# Patient Record
Sex: MALE | Race: WHITE | Employment: OTHER | URBAN - METROPOLITAN AREA
[De-identification: names, ages, dates, MRNs, and addresses within clinical notes are randomized per-mention and may not be internally consistent; named-entity substitution may affect disease eponyms.]

---

## 2017-02-28 ENCOUNTER — OFFICE VISIT (OUTPATIENT)
Dept: FAMILY MEDICINE | Facility: CLINIC | Age: 62
End: 2017-02-28
Payer: COMMERCIAL

## 2017-02-28 VITALS
WEIGHT: 161 LBS | OXYGEN SATURATION: 98 % | HEIGHT: 69 IN | TEMPERATURE: 99.1 F | HEART RATE: 87 BPM | DIASTOLIC BLOOD PRESSURE: 90 MMHG | BODY MASS INDEX: 23.85 KG/M2 | SYSTOLIC BLOOD PRESSURE: 182 MMHG

## 2017-02-28 DIAGNOSIS — Z00.00 ENCOUNTER FOR ROUTINE ADULT HEALTH EXAMINATION WITHOUT ABNORMAL FINDINGS: Primary | ICD-10-CM

## 2017-02-28 DIAGNOSIS — Z23 NEED FOR PROPHYLACTIC VACCINATION AND INOCULATION AGAINST VIRAL HEPATITIS: ICD-10-CM

## 2017-02-28 DIAGNOSIS — F10.20 ETOHISM (H): ICD-10-CM

## 2017-02-28 DIAGNOSIS — E78.5 HYPERLIPIDEMIA LDL GOAL <130: ICD-10-CM

## 2017-02-28 DIAGNOSIS — I10 BENIGN ESSENTIAL HYPERTENSION: ICD-10-CM

## 2017-02-28 DIAGNOSIS — Z11.59 NEED FOR HEPATITIS C SCREENING TEST: ICD-10-CM

## 2017-02-28 PROCEDURE — 90471 IMMUNIZATION ADMIN: CPT | Performed by: FAMILY MEDICINE

## 2017-02-28 PROCEDURE — 90632 HEPA VACCINE ADULT IM: CPT | Performed by: FAMILY MEDICINE

## 2017-02-28 PROCEDURE — 99396 PREV VISIT EST AGE 40-64: CPT | Mod: 25 | Performed by: FAMILY MEDICINE

## 2017-02-28 RX ORDER — ATORVASTATIN CALCIUM 80 MG/1
80 TABLET, FILM COATED ORAL DAILY
Qty: 90 TABLET | Refills: 4 | Status: SHIPPED | OUTPATIENT
Start: 2017-02-28 | End: 2017-04-14

## 2017-02-28 RX ORDER — AMLODIPINE BESYLATE 5 MG/1
5 TABLET ORAL DAILY
Qty: 90 TABLET | Refills: 4 | Status: SHIPPED | OUTPATIENT
Start: 2017-02-28 | End: 2018-05-24

## 2017-02-28 RX ORDER — LOSARTAN POTASSIUM 100 MG/1
100 TABLET ORAL DAILY
Qty: 90 TABLET | Refills: 4 | Status: SHIPPED | OUTPATIENT
Start: 2017-02-28 | End: 2018-05-24

## 2017-02-28 NOTE — LETTER
My Depression Action Plan  Name: Kailash Murray   Date of Birth 1955  Date: 2/28/2017    My doctor: Jazzmine Vitale   My clinic: [address]  [place] [address]  [phone]          GREEN    ZONE   Good Control    What it looks like:     Things are going generally well. You have normal up s and down s. You may even feel depressed from time to time, but bad moods usually last less than a day.   What you need to do:  1. Continue to care for yourself (see self care plan)  2. Check your depression survival kit and update it as needed  3. Follow your physician s recommendations including any medication.  4. Do not stop taking medication unless you consult with your physician first.           YELLOW         ZONE Getting Worse    What it looks like:     Depression is starting to interfere with your life.     It may be hard to get out of bed; you may be starting to isolate yourself from others.    Symptoms of depression are starting to last most all day and this has happened for several days.     You may have suicidal thoughts but they are not constant.   What you need to do:     1. Call your care team, your response to treatment will improve if you keep your care team informed of your progress. Yellow periods are signs an adjustment may need to be made.     2. Continue your self-care, even if you have to fake it!    3. Talk to someone in your support network    4. Open up your depression survival kit           RED    ZONE Medical Alert - Get Help    What it looks like:     Depression is seriously interfering with your life.     You may experience these or other symptoms: You can t get out of bed most days, can t work or engage in other necessary activities, you have trouble taking care of basic hygiene, or basic responsibilities, thoughts of suicide or death that will not go away, self-injurious behavior.     What you need to do:  1. Call your care team and  request a same-day appointment. If they are not available (weekends or after hours) call your local crisis line, emergency room or 911.      Electronically signed by: WALTER KIM, February 28, 2017    Depression Self Care Plan / Survival Kit    Self-Care for Depression  Here s the deal. Your body and mind are really not as separate as most people think.  What you do and think affects how you feel and how you feel influences what you do and think. This means if you do things that people who feel good do, it will help you feel better.  Sometimes this is all it takes.  There is also a place for medication and therapy depending on how severe your depression is, so be sure to consult with your medical provider and/ or Behavioral Health Consultant if your symptoms are worsening or not improving.     In order to better manage my stress, I will:    Exercise  Get some form of exercise, every day. This will help reduce pain and release endorphins, the  feel good  chemicals in your brain. This is almost as good as taking antidepressants!  This is not the same as joining a gym and then never going! (they count on that by the way ) It can be as simple as just going for a walk or doing some gardening, anything that will get you moving.      Hygiene   Maintain good hygiene (Get out of bed in the morning, Make your bed, Brush your teeth, Take a shower, and Get dressed like you were going to work, even if you are unemployed).  If your clothes don't fit try to get ones that do.    Diet  I will strive to eat foods that are good for me, drink plenty of water, and avoid excessive sugar, caffeine, alcohol, and other mood-altering substances.  Some foods that are helpful in depression are: complex carbohydrates, B vitamins, flaxseed, fish or fish oil, fresh fruits and vegetables.    Psychotherapy  I agree to participate in Individual Therapy (if recommended).    Medication  If prescribed medications, I agree to take them.  Missing  doses can result in serious side effects.  I understand that drinking alcohol, or other illicit drug use, may cause potential side effects.  I will not stop my medication abruptly without first discussing it with my provider.    Staying Connected With Others  I will stay in touch with my friends, family members, and my primary care provider/team.    Use your imagination  Be creative.  We all have a creative side; it doesn t matter if it s oil painting, sand castles, or mud pies! This will also kick up the endorphins.    Witness Beauty  (AKA stop and smell the roses) Take a look outside, even in mid-winter. Notice colors, textures. Watch the squirrels and birds.     Service to others  Be of service to others.  There is always someone else in need.  By helping others we can  get out of ourselves  and remember the really important things.  This also provides opportunities for practicing all the other parts of the program.    Humor  Laugh and be silly!  Adjust your TV habits for less news and crime-drama and more comedy.    Control your stress  Try breathing deep, massage therapy, biofeedback, and meditation. Find time to relax each day.     My support system    Clinic Contact:  Phone number:    Contact 1:  Phone number:    Contact 2:  Phone number:    Denominational/:  Phone number:    Therapist:  Phone number:    Local crisis center:    Phone number:    Other community support:  Phone number:

## 2017-02-28 NOTE — NURSING NOTE
Screening Questionnaire for Adult Immunization    Are you sick today?   No   Do you have allergies to medications, food, a vaccine component or latex?   Yes   Have you ever had a serious reaction after receiving a vaccination?   No   Do you have a long-term health problem with heart disease, lung disease, asthma, kidney disease, metabolic disease (e.g. diabetes), anemia, or other blood disorder?   No   Do you have cancer, leukemia, HIV/AIDS, or any other immune system problem?   No   In the past 3 months, have you taken medications that affect  your immune system, such as prednisone, other steroids, or anticancer drugs; drugs for the treatment of rheumatoid arthritis, Crohn s disease, or psoriasis; or have you had radiation treatments?   No   Have you had a seizure, or a brain or other nervous system problem?   No   During the past year, have you received a transfusion of blood or blood     products, or been given immune (gamma) globulin or antiviral drug?   No   For women: Are you pregnant or is there a chance you could become        pregnant during the next month?   No   Have you received any vaccinations in the past 4 weeks?   No     Immunization questionnaire was positive for at least one answer.  Notified provider of patient's allergies.      MNVFC doesn't apply on this patient    Per orders of Dr. Vitale, injection of Hep A given by Renuka Villanueva. Patient instructed to remain in clinic for 20 minutes afterwards, and to report any adverse reaction to me immediately.       Screening performed by Renuka Villanueva on 2/28/2017 at 2:24 PM.

## 2017-02-28 NOTE — NURSING NOTE
"Chief Complaint   Patient presents with     Physical     Medication Reconciliation     Has stopped medications a year ago       Initial /90 (BP Location: Right arm, Patient Position: Chair, Cuff Size: Adult Regular)  Pulse 87  Temp 99.1  F (37.3  C)  Ht 5' 9.25\" (1.759 m)  Wt 161 lb (73 kg)  SpO2 98%  BMI 23.6 kg/m2 Estimated body mass index is 23.6 kg/(m^2) as calculated from the following:    Height as of this encounter: 5' 9.25\" (1.759 m).    Weight as of this encounter: 161 lb (73 kg).  Medication Reconciliation: complete   Meredith Pettit MA      "

## 2017-02-28 NOTE — MR AVS SNAPSHOT
After Visit Summary   2/28/2017    Kailash Murray    MRN: [id number]           Patient Information     Date Of Birth          1955        Visit Information        Provider Department      2/28/2017 2:00 PM Jazzmine Vitale MD [facility]        Today's Diagnoses     Encounter for routine adult health examination without abnormal findings    -  1    Need for hepatitis C screening test        Hyperlipidemia LDL goal <130        Benign essential hypertension        ETOHism (H)        Need for prophylactic vaccination and inoculation against viral hepatitis          Care Instructions      Preventive Health Recommendations  Male Ages 50 - 64    Yearly exam:             See your health care provider every year in order to  o   Review health changes.   o   Discuss preventive care.    o   Review your medicines if your doctor has prescribed any.     Have a cholesterol test every 5 years, or more frequently if you are at risk for high cholesterol/heart disease.     Have a diabetes test (fasting glucose) every three years. If you are at risk for diabetes, you should have this test more often.     Have a colonoscopy at age 50, or have a yearly FIT test (stool test). These exams will check for colon cancer.      Talk with your health care provider about whether or not a prostate cancer screening test (PSA) is right for you.    You should be tested each year for STDs (sexually transmitted diseases), if you re at risk.     Shots: Get a flu shot each year. Get a tetanus shot every 10 years.     Nutrition:    Eat at least 5 servings of fruits and vegetables daily.     Eat whole-grain bread, whole-wheat pasta and brown rice instead of white grains and rice.     Talk to your provider about Calcium and Vitamin D.     Lifestyle    Exercise for at least 150 minutes a week (30 minutes a day, 5 days a week). This will help you control your weight and prevent disease.     Limit alcohol to one drink per  day.     No smoking.     Wear sunscreen to prevent skin cancer.     See your dentist every six months for an exam and cleaning.   See your eye doctor every 1 to 2 years.  [facility]    If you have any questions regarding to your visit please contact your care team:       Team Purple:   Clinic Hours Telephone Number   [address]   7am-7pm  Monday - Thursday   7am-5pm  Fridays  [phone]  (Appointment scheduling available 24/7)    Questions about your Visit?   Team Line:  [phone]   Urgent Care - [place] and [place] - 11am-9pm Monday-Friday Saturday-Sunday- 9am-5pm   [place] - 5pm-9pm Monday-Friday Saturday-Sunday- 9am-5pm  [phone] - [facility]  [phone] - [place]       What options do I have for visits at the clinic other than the traditional office visit?  To expand how we care for you, many of our providers are utilizing electronic visits (e-visits) and telephone visits, when medically appropriate, for interactions with their patients rather than a visit in the clinic.   We also offer nurse visits for many medical concerns. Just like any other service, we will bill your insurance company for this type of visit based on time spent on the phone with your provider. Not all insurance companies cover these visits. Please check with your medical insurance if this type of visit is covered. You will be responsible for any charges that are not paid by your insurance.      E-visits via [employer]:  generally incur a $35.00 fee.  Telephone visits:  Time spent on the phone: *charged based on time that is spent on the phone in increments of 10 minutes. Estimated cost:   5-10 mins $30.00   11-20 mins. $59.00   21-30 mins. $85.00     Use [employer] (secure email communication and access to your chart) to send your primary care provider a message or make an appointment. Ask someone on your Team how to sign up for  [employer].  For a Price Quote for your services, please call our Consumer Price Line at [phone].  As always, Thank you for trusting us with your health care needs!            Follow-ups after your visit        Future tests that were ordered for you today     Open Future Orders        Priority Expected Expires Ordered    Hepatic panel Routine 4/28/2017 2/28/2018 2/28/2017    Hepatitis C Screen Reflex to HCV RNA Quant and Genotype Routine  4/28/2017 2/28/2017    BASIC METABOLIC PANEL Routine  4/28/2017 2/28/2017    Lipid panel reflex to direct LDL Routine  4/28/2017 2/28/2017            Who to contact     If you have questions or need follow up information about today's clinic visit or your schedule please contact [facility] directly at [phone].  Normal or non-critical lab and imaging results will be communicated to you by [employer], letter or phone within 4 business days after the clinic has received the results. If you do not hear from us within 7 days, please contact the clinic through [employer] or phone. If you have a critical or abnormal lab result, we will notify you by phone as soon as possible.  Submit refill requests through [employer] or call your pharmacy and they will forward the refill request to us. Please allow 3 business days for your refill to be completed.          Additional Information About Your Visit        [employer] Information     [employer] gives you secure access to your electronic health record. If you see a primary care provider, you can also send messages to your care team and make appointments. If you have questions, please call your primary care clinic.  If you do not have a primary care provider, please call [phone] and they will assist you.        Care EveryWhere ID     This is your Care EveryWhere ID. This could be used by other organizations to access your [place] medical records  [id number]        Your Vitals Were     Pulse Temperature Height Pulse Oximetry BMI  "(Body Mass Index)       87 99.1  F (37.3  C) 5' 9.25\" (1.759 m) 98% 23.6 kg/m2        Blood Pressure from Last 3 Encounters:   02/28/17 182/90   01/13/16 132/70   02/24/15 132/62    Weight from Last 3 Encounters:   02/28/17 161 lb (73 kg)   01/13/16 173 lb (78.5 kg)   03/02/15 172 lb (78 kg)              We Performed the Following     DEPRESSION ACTION PLAN (DAP) Order [[id number]]     HEPATITIS A VACCINE (ADULT)        Primary Care Provider Office Phone # Fax #    Jazzmine Vitale [phone]       [address] [address]        Thank you!     Thank you for choosing [facility]  for your care. Our goal is always to provide you with excellent care. Hearing back from our patients is one way we can continue to improve our services. Please take a few minutes to complete the written survey that you may receive in the mail after your visit with us. Thank you!             Your Updated Medication List - Protect others around you: Learn how to safely use, store and throw away your medicines at www.disposemymeds.org.          This list is accurate as of: 2/28/17  2:14 PM.  Always use your most recent med list.                   Brand Name Dispense Instructions for use    amLODIPine 5 MG tablet    NORVASC    90 tablet    Take 1 tablet (5 mg) by mouth daily       aspirin 81 MG tablet     30 tablet    Take 1 tablet (81 mg) by mouth daily       atorvastatin 80 MG tablet    LIPITOR    90 tablet    Take 1 tablet (80 mg) by mouth daily Don't mail any until patient calls       losartan 100 MG tablet    COZAAR    90 tablet    Take 1 tablet (100 mg) by mouth daily         "
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
## 2017-02-28 NOTE — PATIENT INSTRUCTIONS
Preventive Health Recommendations  Male Ages 50   64    Yearly exam:             See your health care provider every year in order to  o   Review health changes.   o   Discuss preventive care.    o   Review your medicines if your doctor has prescribed any.     Have a cholesterol test every 5 years, or more frequently if you are at risk for high cholesterol/heart disease.     Have a diabetes test (fasting glucose) every three years. If you are at risk for diabetes, you should have this test more often.     Have a colonoscopy at age 50, or have a yearly FIT test (stool test). These exams will check for colon cancer.      Talk with your health care provider about whether or not a prostate cancer screening test (PSA) is right for you.    You should be tested each year for STDs (sexually transmitted diseases), if you re at risk.     Shots: Get a flu shot each year. Get a tetanus shot every 10 years.     Nutrition:    Eat at least 5 servings of fruits and vegetables daily.     Eat whole-grain bread, whole-wheat pasta and brown rice instead of white grains and rice.     Talk to your provider about Calcium and Vitamin D.     Lifestyle    Exercise for at least 150 minutes a week (30 minutes a day, 5 days a week). This will help you control your weight and prevent disease.     Limit alcohol to one drink per day.     No smoking.     Wear sunscreen to prevent skin cancer.     See your dentist every six months for an exam and cleaning.   See your eye doctor every 1 to 2 years.  [facility]    If you have any questions regarding to your visit please contact your care team:       Team Purple:   Clinic Hours Telephone Number   [address] Dr.   7am-7pm  Monday - Thursday   7am-5pm  Fridays  [phone]  (Appointment scheduling available 24/7)    Questions about your Visit?   Team Line:  [phone]   Urgent Care - [place] and [facility] -  11am-9pm Monday-Friday Saturday-Sunday- 9am-5pm   [place] - 5pm-9pm Monday-Friday Saturday-Sunday- 9am-5pm  [phone] - [place]   [phone] - [place]       What options do I have for visits at the clinic other than the traditional office visit?  To expand how we care for you, many of our providers are utilizing electronic visits (e-visits) and telephone visits, when medically appropriate, for interactions with their patients rather than a visit in the clinic.   We also offer nurse visits for many medical concerns. Just like any other service, we will bill your insurance company for this type of visit based on time spent on the phone with your provider. Not all insurance companies cover these visits. Please check with your medical insurance if this type of visit is covered. You will be responsible for any charges that are not paid by your insurance.      E-visits via [employer]:  generally incur a $35.00 fee.  Telephone visits:  Time spent on the phone: *charged based on time that is spent on the phone in increments of 10 minutes. Estimated cost:   5-10 mins $30.00   11-20 mins. $59.00   21-30 mins. $85.00     Use [employer] (secure email communication and access to your chart) to send your primary care provider a message or make an appointment. Ask someone on your Team how to sign up for [employer].  For a Price Quote for your services, please call our Consumer Price Line at [phone].  As always, Thank you for trusting us with your health care needs!

## 2017-03-01 DIAGNOSIS — E78.5 HYPERLIPIDEMIA LDL GOAL <130: ICD-10-CM

## 2017-03-01 DIAGNOSIS — I10 BENIGN ESSENTIAL HYPERTENSION: ICD-10-CM

## 2017-03-01 DIAGNOSIS — Z11.59 NEED FOR HEPATITIS C SCREENING TEST: ICD-10-CM

## 2017-03-01 LAB
ANION GAP SERPL CALCULATED.3IONS-SCNC: 6 MMOL/L (ref 3–14)
BUN SERPL-MCNC: 16 MG/DL (ref 7–30)
CALCIUM SERPL-MCNC: 9.1 MG/DL (ref 8.5–10.1)
CHLORIDE SERPL-SCNC: 105 MMOL/L (ref 94–109)
CHOLEST SERPL-MCNC: 219 MG/DL
CO2 SERPL-SCNC: 28 MMOL/L (ref 20–32)
CREAT SERPL-MCNC: 0.78 MG/DL (ref 0.66–1.25)
GFR SERPL CREATININE-BSD FRML MDRD: NORMAL ML/MIN/1.7M2
GLUCOSE SERPL-MCNC: 93 MG/DL (ref 70–99)
HCV AB SERPL QL IA: NORMAL
HDLC SERPL-MCNC: 85 MG/DL
LDLC SERPL CALC-MCNC: 115 MG/DL
NONHDLC SERPL-MCNC: 134 MG/DL
POTASSIUM SERPL-SCNC: 4.1 MMOL/L (ref 3.4–5.3)
SODIUM SERPL-SCNC: 139 MMOL/L (ref 133–144)
TRIGL SERPL-MCNC: 93 MG/DL

## 2017-03-01 PROCEDURE — 80061 LIPID PANEL: CPT | Performed by: FAMILY MEDICINE

## 2017-03-01 PROCEDURE — 80048 BASIC METABOLIC PNL TOTAL CA: CPT | Performed by: FAMILY MEDICINE

## 2017-03-01 PROCEDURE — 86803 HEPATITIS C AB TEST: CPT | Performed by: FAMILY MEDICINE

## 2017-03-01 PROCEDURE — 36415 COLL VENOUS BLD VENIPUNCTURE: CPT | Performed by: FAMILY MEDICINE

## 2017-03-06 NOTE — PROGRESS NOTES
Kailash,    Your hepatitis C, blood glucose, and kidney tests are normal. Your cholesterol is controlled.     Carolyne Cornejo MD
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
## 2017-04-14 ENCOUNTER — OFFICE VISIT (OUTPATIENT)
Dept: FAMILY MEDICINE | Facility: CLINIC | Age: 62
End: 2017-04-14
Payer: COMMERCIAL

## 2017-04-14 VITALS
OXYGEN SATURATION: 95 % | TEMPERATURE: 98 F | BODY MASS INDEX: 23.46 KG/M2 | SYSTOLIC BLOOD PRESSURE: 124 MMHG | WEIGHT: 160 LBS | DIASTOLIC BLOOD PRESSURE: 80 MMHG | HEART RATE: 67 BPM

## 2017-04-14 DIAGNOSIS — E78.5 HYPERLIPIDEMIA LDL GOAL <130: ICD-10-CM

## 2017-04-14 DIAGNOSIS — I10 BENIGN ESSENTIAL HYPERTENSION: Primary | ICD-10-CM

## 2017-04-14 DIAGNOSIS — F32.1 MAJOR DEPRESSIVE DISORDER, SINGLE EPISODE, MODERATE (H): ICD-10-CM

## 2017-04-14 LAB
ALBUMIN SERPL-MCNC: 4.1 G/DL (ref 3.4–5)
ALP SERPL-CCNC: 69 U/L (ref 40–150)
ALT SERPL W P-5'-P-CCNC: 37 U/L (ref 0–70)
ANION GAP SERPL CALCULATED.3IONS-SCNC: 8 MMOL/L (ref 3–14)
AST SERPL W P-5'-P-CCNC: 30 U/L (ref 0–45)
BILIRUB SERPL-MCNC: 0.2 MG/DL (ref 0.2–1.3)
BUN SERPL-MCNC: 17 MG/DL (ref 7–30)
CALCIUM SERPL-MCNC: 9.1 MG/DL (ref 8.5–10.1)
CHLORIDE SERPL-SCNC: 109 MMOL/L (ref 94–109)
CHOLEST SERPL-MCNC: 216 MG/DL
CO2 SERPL-SCNC: 25 MMOL/L (ref 20–32)
CREAT SERPL-MCNC: 0.84 MG/DL (ref 0.66–1.25)
GFR SERPL CREATININE-BSD FRML MDRD: NORMAL ML/MIN/1.7M2
GLUCOSE SERPL-MCNC: 91 MG/DL (ref 70–99)
HDLC SERPL-MCNC: 91 MG/DL
LDLC SERPL CALC-MCNC: 107 MG/DL
NONHDLC SERPL-MCNC: 125 MG/DL
POTASSIUM SERPL-SCNC: 4.3 MMOL/L (ref 3.4–5.3)
PROT SERPL-MCNC: 7.7 G/DL (ref 6.8–8.8)
SODIUM SERPL-SCNC: 142 MMOL/L (ref 133–144)
TRIGL SERPL-MCNC: 91 MG/DL

## 2017-04-14 PROCEDURE — 99213 OFFICE O/P EST LOW 20 MIN: CPT | Performed by: FAMILY MEDICINE

## 2017-04-14 PROCEDURE — 80053 COMPREHEN METABOLIC PANEL: CPT | Performed by: FAMILY MEDICINE

## 2017-04-14 PROCEDURE — 80061 LIPID PANEL: CPT | Performed by: FAMILY MEDICINE

## 2017-04-14 PROCEDURE — 36415 COLL VENOUS BLD VENIPUNCTURE: CPT | Performed by: FAMILY MEDICINE

## 2017-04-14 RX ORDER — ASCORBIC ACID 125 MG
TABLET,CHEWABLE ORAL
COMMUNITY
Start: 2017-04-14 | End: 2018-10-08

## 2017-04-14 RX ORDER — MULTIPLE VITAMINS W/ MINERALS TAB 9MG-400MCG
1 TAB ORAL DAILY
COMMUNITY
End: 2018-10-08

## 2017-04-14 ASSESSMENT — ANXIETY QUESTIONNAIRES
2. NOT BEING ABLE TO STOP OR CONTROL WORRYING: NOT AT ALL
7. FEELING AFRAID AS IF SOMETHING AWFUL MIGHT HAPPEN: NOT AT ALL
5. BEING SO RESTLESS THAT IT IS HARD TO SIT STILL: NOT AT ALL
6. BECOMING EASILY ANNOYED OR IRRITABLE: NOT AT ALL
1. FEELING NERVOUS, ANXIOUS, OR ON EDGE: NOT AT ALL
IF YOU CHECKED OFF ANY PROBLEMS ON THIS QUESTIONNAIRE, HOW DIFFICULT HAVE THESE PROBLEMS MADE IT FOR YOU TO DO YOUR WORK, TAKE CARE OF THINGS AT HOME, OR GET ALONG WITH OTHER PEOPLE: NOT DIFFICULT AT ALL
GAD7 TOTAL SCORE: 0
3. WORRYING TOO MUCH ABOUT DIFFERENT THINGS: NOT AT ALL

## 2017-04-14 ASSESSMENT — PAIN SCALES - GENERAL: PAINLEVEL: NO PAIN (0)

## 2017-04-14 ASSESSMENT — PATIENT HEALTH QUESTIONNAIRE - PHQ9: 5. POOR APPETITE OR OVEREATING: NOT AT ALL

## 2017-04-14 NOTE — PATIENT INSTRUCTIONS
[facility]    If you have any questions regarding to your visit please contact your care team:       Team Purple:   Clinic Hours Telephone Number   [address] Dr.   7am-7pm  Monday - Thursday   7am-5pm  Fridays  [phone]  (Appointment scheduling available 24/7)    Questions about your Visit?   Team Line:  [phone]   Urgent Care - [place] and [facility] - 11am-9pm Monday-Friday Saturday-Sunday- 9am-5pm   [place] - 5pm-9pm Monday-Friday Saturday-Sunday- 9am-5pm  [phone] - [facility]  [phone] - [place]       What options do I have for visits at the clinic other than the traditional office visit?  To expand how we care for you, many of our providers are utilizing electronic visits (e-visits) and telephone visits, when medically appropriate, for interactions with their patients rather than a visit in the clinic.   We also offer nurse visits for many medical concerns. Just like any other service, we will bill your insurance company for this type of visit based on time spent on the phone with your provider. Not all insurance companies cover these visits. Please check with your medical insurance if this type of visit is covered. You will be responsible for any charges that are not paid by your insurance.      E-visits via [employer]:  generally incur a $35.00 fee.  Telephone visits:  Time spent on the phone: *charged based on time that is spent on the phone in increments of 10 minutes. Estimated cost:   5-10 mins $30.00   11-20 mins. $59.00   21-30 mins. $85.00     Use [employer] (secure email communication and access to your chart) to send your primary care provider a message or make an appointment. Ask someone on your Team how to sign up for [employer].  For a Price Quote for your services, please call our Consumer Price Line at [phone].  As always, Thank you for trusting us with your health care needs!

## 2017-04-14 NOTE — MR AVS SNAPSHOT
After Visit Summary   4/14/2017    Kailash Murray    MRN: [id number]           Patient Information     Date Of Birth          1955        Visit Information        Provider Department      4/14/2017 10:30 AM Jazzmine Vitale MD [facility]        Today's Diagnoses     Benign essential hypertension    -  1    Hyperlipidemia LDL goal <130        Major depressive disorder, single episode, moderate (H)          Care Instructions    [facility]    If you have any questions regarding to your visit please contact your care team:       Team Purple:   Clinic Hours Telephone Number   [address] Dr.   7am-7pm  Monday - Thursday   7am-5pm  Fridays  [phone]  (Appointment scheduling available 24/7)    Questions about your Visit?   Team Line:  [phone]   [facility] [place] [facility] 11am-9pm Monday-Friday Saturday-Sunday- 9am-5pm   [place] - 5pm-9pm Monday-Friday Saturday-Sunday- 9am-5pm  [phone] - [facility]  [phone] - [place]       What options do I have for visits at the clinic other than the traditional office visit?  To expand how we care for you, many of our providers are utilizing electronic visits (e-visits) and telephone visits, when medically appropriate, for interactions with their patients rather than a visit in the clinic.   We also offer nurse visits for many medical concerns. Just like any other service, we will bill your insurance company for this type of visit based on time spent on the phone with your provider. Not all insurance companies cover these visits. Please check with your medical insurance if this type of visit is covered. You will be responsible for any charges that are not paid by your insurance.      E-visits via SimpleSite:  generally incur a $35.00 fee.  Telephone visits:  Time spent on the phone: *charged based on time that is spent on the phone  in increments of 10 minutes. Estimated cost:   5-10 mins $30.00   11-20 mins. $59.00   21-30 mins. $85.00     Use Ludesit (secure email communication and access to your chart) to send your primary care provider a message or make an appointment. Ask someone on your Team how to sign up for [employer].  For a Price Quote for your services, please call our [employer] Line at [phone].  As always, Thank you for trusting us with your health care needs!            Follow-ups after your visit        Who to contact     If you have questions or need follow up information about today's clinic visit or your schedule please contact [facility] directly at [phone].  Normal or non-critical lab and imaging results will be communicated to you by [employer], letter or phone within 4 business days after the clinic has received the results. If you do not hear from us within 7 days, please contact the clinic through Ludesit or phone. If you have a critical or abnormal lab result, we will notify you by phone as soon as possible.  Submit refill requests through [employer] or call your pharmacy and they will forward the refill request to us. Please allow 3 business days for your refill to be completed.          Additional Information About Your Visit        [employer] Information     [employer] gives you secure access to your electronic health record. If you see a primary care provider, you can also send messages to your care team and make appointments. If you have questions, please call your primary care clinic.  If you do not have a primary care provider, please call [phone] and they will assist you.        Care EveryWhere ID     This is your Care EveryWhere ID. This could be used by other organizations to access your Mobile medical records  [id number]        Your Vitals Were     Pulse Temperature Pulse Oximetry BMI (Body Mass Index)          67 98  F (36.7  C) (Oral) 95% 23.46 kg/m2         Blood Pressure from Last 3  Encounters:   04/14/17 124/80   02/28/17 182/90   01/13/16 132/70    Weight from Last 3 Encounters:   04/14/17 160 lb (72.6 kg)   02/28/17 161 lb (73 kg)   01/13/16 173 lb (78.5 kg)              We Performed the Following     Comprehensive metabolic panel     Lipid panel reflex to direct LDL        Primary Care Provider Office Phone # Fax #    Jazzmine Vitale [phone]       [address] [address]        Thank you!     Thank you for choosing [facility]  for your care. Our goal is always to provide you with excellent care. Hearing back from our patients is one way we can continue to improve our services. Please take a few minutes to complete the written survey that you may receive in the mail after your visit with us. Thank you!             Your Updated Medication List - Protect others around you: Learn how to safely use, store and throw away your medicines at www.disposemymeds.org.          This list is accurate as of: 4/14/17 10:54 AM.  Always use your most recent med list.                   Brand Name Dispense Instructions for use    amLODIPine 5 MG tablet    NORVASC    90 tablet    Take 1 tablet (5 mg) by mouth daily       aspirin 81 MG tablet     30 tablet    Take 1 tablet (81 mg) by mouth daily       losartan 100 MG tablet    COZAAR    90 tablet    Take 1 tablet (100 mg) by mouth daily       Multi-vitamin Tabs tablet      Take 1 tablet by mouth daily

## 2017-04-14 NOTE — LETTER
[facility]  [address] [place] [address] [place] [address]    April 17, 2017    Kailash Murray  [address] [place] [id number]          Dear Kailash,    All of your lab tests are normal. Continue your healthy lifestyle    Enclosed is a copy of your results.     Results for orders placed or performed in visit on 04/14/17   Lipid panel reflex to direct LDL   Result Value Ref Range    Cholesterol 216 (H) <200 mg/dL    Triglycerides 91 <150 mg/dL    HDL Cholesterol 91 >39 mg/dL    LDL Cholesterol Calculated 107 (H) <100 mg/dL    Non HDL Cholesterol 125 <130 mg/dL   Comprehensive metabolic panel   Result Value Ref Range    Sodium 142 133 - 144 mmol/L    Potassium 4.3 3.4 - 5.3 mmol/L    Chloride 109 94 - 109 mmol/L    Carbon Dioxide 25 20 - 32 mmol/L    Anion Gap 8 3 - 14 mmol/L    Glucose 91 70 - 99 mg/dL    Urea Nitrogen 17 7 - 30 mg/dL    Creatinine 0.84 0.66 - 1.25 mg/dL    GFR Estimate >90  Non  GFR Calc   >60 mL/min/1.7m2    GFR Estimate If Black >90   GFR Calc   >60 mL/min/1.7m2    Calcium 9.1 8.5 - 10.1 mg/dL    Bilirubin Total 0.2 0.2 - 1.3 mg/dL    Albumin 4.1 3.4 - 5.0 g/dL    Protein Total 7.7 6.8 - 8.8 g/dL    Alkaline Phosphatase 69 40 - 150 U/L    ALT 37 0 - 70 U/L    AST 30 0 - 45 U/L       If you have any questions or concerns, please call myself or my nurse at [phone].      Sincerely,        Jazzmine Vitale MD/SANTY

## 2017-04-14 NOTE — NURSING NOTE
"Chief Complaint   Patient presents with     RECHECK     From stopping Atorvastatin. Labs - patient is fasting.       Initial /80 (BP Location: Right arm, Cuff Size: Adult Regular)  Pulse 67  Temp 98  F (36.7  C) (Oral)  Wt 160 lb (72.6 kg)  SpO2 95%  BMI 23.46 kg/m2 Estimated body mass index is 23.46 kg/(m^2) as calculated from the following:    Height as of 2/28/17: 5' 9.25\" (1.759 m).    Weight as of this encounter: 160 lb (72.6 kg).  Medication Reconciliation: complete       Jennifer Luke CMA      "

## 2017-04-15 ASSESSMENT — PATIENT HEALTH QUESTIONNAIRE - PHQ9: SUM OF ALL RESPONSES TO PHQ QUESTIONS 1-9: 0

## 2017-04-15 ASSESSMENT — ANXIETY QUESTIONNAIRES: GAD7 TOTAL SCORE: 0
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
## 2018-05-11 ENCOUNTER — RADIANT APPOINTMENT (OUTPATIENT)
Dept: CT IMAGING | Facility: CLINIC | Age: 63
End: 2018-05-11
Attending: FAMILY MEDICINE
Payer: COMMERCIAL

## 2018-05-11 ENCOUNTER — OFFICE VISIT (OUTPATIENT)
Dept: FAMILY MEDICINE | Facility: CLINIC | Age: 63
End: 2018-05-11
Payer: COMMERCIAL

## 2018-05-11 VITALS
BODY MASS INDEX: 22.59 KG/M2 | HEIGHT: 69 IN | HEART RATE: 73 BPM | WEIGHT: 152.5 LBS | RESPIRATION RATE: 16 BRPM | OXYGEN SATURATION: 98 % | DIASTOLIC BLOOD PRESSURE: 82 MMHG | TEMPERATURE: 97.3 F | SYSTOLIC BLOOD PRESSURE: 136 MMHG

## 2018-05-11 DIAGNOSIS — F17.200 SMOKER: ICD-10-CM

## 2018-05-11 DIAGNOSIS — M25.522 PAIN IN JOINT INVOLVING UPPER ARM, LEFT: ICD-10-CM

## 2018-05-11 DIAGNOSIS — E78.5 HYPERLIPIDEMIA LDL GOAL <130: ICD-10-CM

## 2018-05-11 DIAGNOSIS — I10 BENIGN ESSENTIAL HYPERTENSION: ICD-10-CM

## 2018-05-11 DIAGNOSIS — Z12.11 SCREEN FOR COLON CANCER: ICD-10-CM

## 2018-05-11 DIAGNOSIS — M25.522 PAIN IN JOINT INVOLVING UPPER ARM, LEFT: Primary | ICD-10-CM

## 2018-05-11 PROCEDURE — 99214 OFFICE O/P EST MOD 30 MIN: CPT | Performed by: FAMILY MEDICINE

## 2018-05-11 PROCEDURE — 71250 CT THORAX DX C-: CPT | Mod: TC

## 2018-05-11 ASSESSMENT — PAIN SCALES - GENERAL: PAINLEVEL: EXTREME PAIN (9)

## 2018-05-11 NOTE — PATIENT INSTRUCTIONS
[facility]    If you have any questions regarding to your visit please contact your care team:       Team Purple:   Clinic Hours Telephone Number   Dr. Jazzmine Rodriguez   7am-7pm  Monday - Thursday   7am-5pm  Fridays  [phone]  (Appointment scheduling available 24/7)    Questions about your recent visit?   Team Line:  [phone]   Urgent Care - [place] and [facility] - 11am-9pm Monday-Friday Saturday-Sunday- 9am-5pm   [place] - 5pm-9pm Monday-Friday Saturday-Sunday- 9am-5pm  [phone] - [place]  [phone] [facility]       What options do I have for a visit other than an office visit? We offer electronic visits (e-visits) and telephone visits, when medically appropriate.  Please check with your medical insurance to see if these types of visits are covered, as you will be responsible for any charges that are not paid by your insurance.      You can use [email or web address] (secure electronic communication) to access to your chart, send your primary care provider a message, or make an appointment. Ask a team member how to get started.     For a price quote for your services, please call our Consumer Price Line at [phone] or our Imaging Cost estimation line at [phone] (for imaging tests).

## 2018-05-11 NOTE — MR AVS SNAPSHOT
After Visit Summary   5/11/2018    Kailash Murray    MRN: [id number]           Patient Information     Date Of Birth          1955        Visit Information        Provider Department      5/11/2018 10:00 AM Jazzmine Vitale MD [facility]        Today's Diagnoses     Pain in joint involving upper arm, left    -  1    Smoker        Screen for colon cancer        Benign essential hypertension        Hyperlipidemia LDL goal <130          Care Instructions    [facility]    If you have any questions regarding to your visit please contact your care team:       Team Purple:   Clinic Hours Telephone Number   Dr. Jazzmine Rodriguez   7am-7pm  Monday - Thursday   7am-5pm  Fridays  [phone]  (Appointment scheduling available 24/7)    Questions about your recent visit?   Team Line:  [phone]   Urgent Care - [place] and [facility] - 11am-9pm Monday-Friday Saturday-Sunday- 9am-5pm   [place] - 5pm-9pm Monday-Friday Saturday-Sunday- 9am-5pm  [phone] - [place]  [phone] [facility]       What options do I have for a visit other than an office visit? We offer electronic visits (e-visits) and telephone visits, when medically appropriate.  Please check with your medical insurance to see if these types of visits are covered, as you will be responsible for any charges that are not paid by your insurance.      You can use [employer] (secure electronic communication) to access to your chart, send your primary care provider a message, or make an appointment. Ask a team member how to get started.     For a price quote for your services, please call our Consumer Price Line at [phone] or our Imaging Cost estimation line at [phone] (for imaging tests).              Follow-ups after your visit        Additional Services     GASTROENTEROLOGY ADULT REF PROCEDURE ONLY [facility] [phone]; [place]  [facility]       Last Lab Result: Creatinine (mg/dL)       Date                     Value                 04/14/2017               0.84             ----------  There is no height or weight on file to calculate BMI.     Needed:  No  Language:  English    Patient will be contacted to schedule procedure.     Please be aware that coverage of these services is subject to the terms and limitations of your health insurance plan.  Call member services at your health plan with any benefit or coverage questions.  Any procedures must be performed at a [place] facility OR coordinated by your clinic's referral office.    Please bring the following with you to your appointment:    (1) Any X-Rays, CTs or MRIs which have been performed.  Contact the facility where they were done to arrange for  prior to your scheduled appointment.    (2) List of current medications   (3) This referral request   (4) Any documents/labs given to you for this referral                  Future tests that were ordered for you today     Open Future Orders        Priority Expected Expires Ordered    CT Chest w/o Contrast Routine  5/11/2019 5/11/2018    Lipid panel reflex to direct LDL Fasting Routine  6/11/2018 5/11/2018    Comprehensive metabolic panel Routine  6/11/2018 5/11/2018            Who to contact     If you have questions or need follow up information about today's clinic visit or your schedule please contact [facility] directly at [phone].  Normal or non-critical lab and imaging results will be communicated to you by MyChart, letter or phone within 4 business days after the clinic has received the results. If you do not hear from us within 7 days, please contact the clinic through MyChart or phone. If you have a critical or abnormal lab result, we will notify you by phone as soon as possible.  Submit refill requests through [employer]y or call your pharmacy and they will forward the refill request to us.  "Please allow 3 business days for your refill to be completed.          Additional Information About Your Visit        MyChart Information     [employer] gives you secure access to your electronic health record. If you see a primary care provider, you can also send messages to your care team and make appointments. If you have questions, please call your primary care clinic.  If you do not have a primary care provider, please call [phone] and they will assist you.        Care EveryWhere ID     This is your Care EveryWhere ID. This could be used by other organizations to access your [place] medical records  [id number]        Your Vitals Were     Pulse Temperature Respirations Height Pulse Oximetry BMI (Body Mass Index)    73 97.3  F (36.3  C) (Oral) 16 5' 8.58\" (1.742 m) 98% 22.8 kg/m2       Blood Pressure from Last 3 Encounters:   05/11/18 136/82   04/14/17 124/80   02/28/17 182/90    Weight from Last 3 Encounters:   05/11/18 152 lb 8 oz (69.2 kg)   04/14/17 160 lb (72.6 kg)   02/28/17 161 lb (73 kg)              We Performed the Following     GASTROENTEROLOGY ADULT REF PROCEDURE ONLY [facility] [phone]; [place] [facility]        Primary Care Provider Office Phone # Fax #    Jazzmine Vitale [phone] [address] [facility] [address]        Equal Access to Services     [facility]: Hadii aad ku hadasho Soomaali, waaxda luqadaha, qaybta kaalmada adeegyada, waxay anca ortiz. So [facility] [phone].    ATENCIÓN: Si habla español, tiene a doyle disposición servicios gratuitos de asistencia lingüística. Llame al [phone].    We comply with applicable federal civil rights laws and [place] laws. We do not discriminate on the basis of race, color, national origin, age, disability, sex, sexual orientation, or gender identity.            Thank you!     Thank you for choosing [facility]  for your care. Our goal is always " to provide you with excellent care. Hearing back from our patients is one way we can continue to improve our services. Please take a few minutes to complete the written survey that you may receive in the mail after your visit with us. Thank you!             Your Updated Medication List - Protect others around you: Learn how to safely use, store and throw away your medicines at www.disposemymeds.org.          This list is accurate as of 5/11/18 10:24 AM.  Always use your most recent med list.                   Brand Name Dispense Instructions for use Diagnosis    amLODIPine 5 MG tablet    NORVASC    90 tablet    Take 1 tablet (5 mg) by mouth daily    Benign essential hypertension       aspirin 81 MG tablet     30 tablet    Take 1 tablet (81 mg) by mouth daily    Hypertension goal BP (blood pressure) < 140/90       losartan 100 MG tablet    COZAAR    90 tablet    Take 1 tablet (100 mg) by mouth daily    Benign essential hypertension       Magnesium Citrate 125 MG Caps           Multi-vitamin Tabs tablet      Take 1 tablet by mouth daily

## 2018-05-11 NOTE — LETTER
My Depression Action Plan  Name: Kailash Murray   Date of Birth 1955  Date: 5/9/2018    My doctor: Jazzmine Vitale   My clinic: [address]  [place] [address]  [phone]          GREEN    ZONE   Good Control    What it looks like:     Things are going generally well. You have normal up s and down s. You may even feel depressed from time to time, but bad moods usually last less than a day.   What you need to do:  1. Continue to care for yourself (see self care plan)  2. Check your depression survival kit and update it as needed  3. Follow your physician s recommendations including any medication.  4. Do not stop taking medication unless you consult with your physician first.           YELLOW         ZONE Getting Worse    What it looks like:     Depression is starting to interfere with your life.     It may be hard to get out of bed; you may be starting to isolate yourself from others.    Symptoms of depression are starting to last most all day and this has happened for several days.     You may have suicidal thoughts but they are not constant.   What you need to do:     1. Call your care team, your response to treatment will improve if you keep your care team informed of your progress. Yellow periods are signs an adjustment may need to be made.     2. Continue your self-care, even if you have to fake it!    3. Talk to someone in your support network    4. Open up your depression survival kit           RED    ZONE Medical Alert - Get Help    What it looks like:     Depression is seriously interfering with your life.     You may experience these or other symptoms: You can t get out of bed most days, can t work or engage in other necessary activities, you have trouble taking care of basic hygiene, or basic responsibilities, thoughts of suicide or death that will not go away, self-injurious behavior.     What you need to do:  1. Call your care team and  request a same-day appointment. If they are not available (weekends or after hours) call your local crisis line, emergency room or 911.            Depression Self Care Plan / Survival Kit    Self-Care for Depression  Here s the deal. Your body and mind are really not as separate as most people think.  What you do and think affects how you feel and how you feel influences what you do and think. This means if you do things that people who feel good do, it will help you feel better.  Sometimes this is all it takes.  There is also a place for medication and therapy depending on how severe your depression is, so be sure to consult with your medical provider and/ or Behavioral Health Consultant if your symptoms are worsening or not improving.     In order to better manage my stress, I will:    Exercise  Get some form of exercise, every day. This will help reduce pain and release endorphins, the  feel good  chemicals in your brain. This is almost as good as taking antidepressants!  This is not the same as joining a gym and then never going! (they count on that by the way ) It can be as simple as just going for a walk or doing some gardening, anything that will get you moving.      Hygiene   Maintain good hygiene (Get out of bed in the morning, Make your bed, Brush your teeth, Take a shower, and Get dressed like you were going to work, even if you are unemployed).  If your clothes don't fit try to get ones that do.    Diet  I will strive to eat foods that are good for me, drink plenty of water, and avoid excessive sugar, caffeine, alcohol, and other mood-altering substances.  Some foods that are helpful in depression are: complex carbohydrates, B vitamins, flaxseed, fish or fish oil, fresh fruits and vegetables.    Psychotherapy  I agree to participate in Individual Therapy (if recommended).    Medication  If prescribed medications, I agree to take them.  Missing doses can result in serious side effects.  I understand that  drinking alcohol, or other illicit drug use, may cause potential side effects.  I will not stop my medication abruptly without first discussing it with my provider.    Staying Connected With Others  I will stay in touch with my friends, family members, and my primary care provider/team.    Use your imagination  Be creative.  We all have a creative side; it doesn t matter if it s oil painting, sand castles, or mud pies! This will also kick up the endorphins.    Witness Beauty  (AKA stop and smell the roses) Take a look outside, even in mid-winter. Notice colors, textures. Watch the squirrels and birds.     Service to others  Be of service to others.  There is always someone else in need.  By helping others we can  get out of ourselves  and remember the really important things.  This also provides opportunities for practicing all the other parts of the program.    Humor  Laugh and be silly!  Adjust your TV habits for less news and crime-drama and more comedy.    Control your stress  Try breathing deep, massage therapy, biofeedback, and meditation. Find time to relax each day.     My support system    Clinic Contact:  Phone number:    Contact 1:  Phone number:    Contact 2:  Phone number:    Jehovah's witness/:  Phone number:    Therapist:  Phone number:    Local crisis center:    Phone number:    Other community support:  Phone number:

## 2018-05-11 NOTE — LETTER
"My Heart Failure Action Plan   Name: Kailash Murray    YOB: 1955   Date: 5/9/2018    My doctor: Jazzmine Vitale     [address]  [place] [id number] [phone]  My Diagnosis: {HF TYPE:491983::\"Systolic Heart Failure\"}   My Ejection Fraction: {EF%:219731}    My Exercise Goal: 30 minutes daily  .     My Weight Goal: ***  Wt Readings from Last 2 Encounters:   04/14/17 160 lb (72.6 kg)   02/28/17 161 lb (73 kg)     Weigh yourself daily using the same scale. If you gain more than 2 pounds in 24 hours or 5 pounds in a week {HF WEIGHT GOAL:326213}    My Diet Goal: {HF DIET GOAL:999468::\"No added salt\"}    Emergency Room Visits:    Our goal is to improve your quality of life and help you avoid a visit to the emergency room or hospital.  If we work together, we can achieve this goal. But, if you feel you need to call 911 or go to the emergency room, please do so.  If you go to the emergency room, please bring your list of medicines and your daily weight chart with you.       GREEN ZONE     Doing well today    Weight gained is no more than 2 pounds a day or 5 pounds a week.    No swelling in feet, ankles, legs or stomach.    No more swelling than usual.    No more trouble breathing than usual.    No change in my sleep.    No other problems. Actions:    I am doing fine.  I will take my medicine, follow my diet, see my doctor, exercise, and watch for symptoms.           YELLOW ZONE         Having a bad day or flare up    Weight gain of more than 2 pounds in one day or 5 pounds in one week.    New swelling in ankle, leg, knee or thigh.    Bloating in belly, pants feel tighter.    Swelling in hands or face.    Coughing or trouble breathing while walking or talking.    Harder to breathe last night.    Have trouble sleeping, wake up short of breath.    Much more tired than usual.    Not eating.    Pain in my chest or bad leg cramps.    Feel weak or dizzy. Actions:    I " need to take action and call my doctor or nurse today.                 RED ZONE         Need medical care now    Weight gain of 5 pounds overnight.    Chest pain or pressure that does not go away.    Feel less alert.    Wheezing or have trouble breathing when at rest.    Cannot sleep lying down.    Cannot take my water pill.    Pass out or faint. Actions:    I need to call my doctor or nurse now!    Call 911 if I have chest pain or cannot breathe.

## 2018-05-14 ENCOUNTER — TELEPHONE (OUTPATIENT)
Dept: FAMILY MEDICINE | Facility: CLINIC | Age: 63
End: 2018-05-14

## 2018-05-14 DIAGNOSIS — M25.522 PAIN IN JOINT INVOLVING UPPER ARM, LEFT: Primary | ICD-10-CM

## 2018-05-14 NOTE — TELEPHONE ENCOUNTER
Called patient gave him the number to PT and the rest of providers message.  Corina Grady  Team Juan,

## 2018-05-14 NOTE — TELEPHONE ENCOUNTER
Call him. His chest cat scan was read as showing no tumor. Would he be willing to try some physical therapy to help with his arm pain?    WALTER KIM M.D.

## 2018-05-14 NOTE — TELEPHONE ENCOUNTER
Detailed VM left on patients phone regarding providers note below. Advised to call back with questions, [phone].    Would patient like to try PT?    Missy Woodward RN

## 2018-05-14 NOTE — TELEPHONE ENCOUNTER
Reason for Call:  Other call back    Detailed comments: Patient is calling for the CT scan results; please return the call.    Phone Number Patient can be reached at: Cell number on file:    Telephone Information:   Mobile [phone]       Best Time: Anytime    Can we leave a detailed message on this number? YES    Call taken on 5/14/2018 at 8:24 AM by Iza Muñoz

## 2018-05-14 NOTE — TELEPHONE ENCOUNTER
Call him. Physical therapy has been ordered. He can call [phone] to schedule here at [place]. If he doesn't improve, he needs to let me know. Then I will order MRI of his cervical spine.       WALTER KIM M.D.    Also remind to have colonoscopy.

## 2018-05-14 NOTE — TELEPHONE ENCOUNTER
Patient updated.    He is interested in trying PT.    Dr. Vitale- Please place referral and team to call patient with updates on phone number from referral    Fabien Schaffer RN

## 2018-05-21 ENCOUNTER — RADIANT APPOINTMENT (OUTPATIENT)
Dept: GENERAL RADIOLOGY | Facility: CLINIC | Age: 63
End: 2018-05-21
Attending: FAMILY MEDICINE
Payer: COMMERCIAL

## 2018-05-21 ENCOUNTER — OFFICE VISIT (OUTPATIENT)
Dept: FAMILY MEDICINE | Facility: CLINIC | Age: 63
End: 2018-05-21
Payer: COMMERCIAL

## 2018-05-21 VITALS
RESPIRATION RATE: 25 BRPM | WEIGHT: 150 LBS | HEIGHT: 68 IN | TEMPERATURE: 97.3 F | DIASTOLIC BLOOD PRESSURE: 60 MMHG | SYSTOLIC BLOOD PRESSURE: 134 MMHG | HEART RATE: 77 BPM | BODY MASS INDEX: 22.73 KG/M2 | OXYGEN SATURATION: 97 %

## 2018-05-21 DIAGNOSIS — M79.602 PAIN OF LEFT UPPER EXTREMITY: ICD-10-CM

## 2018-05-21 DIAGNOSIS — I10 BENIGN ESSENTIAL HYPERTENSION: ICD-10-CM

## 2018-05-21 DIAGNOSIS — I50.32 CHRONIC DIASTOLIC HEART FAILURE (H): ICD-10-CM

## 2018-05-21 DIAGNOSIS — R06.2 WHEEZING: ICD-10-CM

## 2018-05-21 DIAGNOSIS — M79.602 PAIN OF LEFT UPPER EXTREMITY: Primary | ICD-10-CM

## 2018-05-21 LAB
FEF 25/75: NORMAL
FEV-1: NORMAL
FEV1/FVC: NORMAL
FVC: NORMAL

## 2018-05-21 PROCEDURE — 94010 BREATHING CAPACITY TEST: CPT | Performed by: FAMILY MEDICINE

## 2018-05-21 PROCEDURE — 99214 OFFICE O/P EST MOD 30 MIN: CPT | Mod: 25 | Performed by: FAMILY MEDICINE

## 2018-05-21 PROCEDURE — 72040 X-RAY EXAM NECK SPINE 2-3 VW: CPT

## 2018-05-21 NOTE — LETTER
REPORT OF WORKABILITY    [facility]  [address]  [place] [address]  [phone]      PATIENT DATA    Employee Name: Kailash Murray      : 1955     #: [id number]    Work related injury: No  Employer at time of injury: [employer]   Employer contact & phone:    Employed elsewhere? No  Workers' Compensation Carrier/Managed Care Plan:       Today's date: 2018  Date of injury: n/a   Date of first visit: 2018    PROVIDER EVALUATION: Please fill in as needed.  Please give copy to employee for employer.    1. Diagnosis: left upper extremity radiculopathy     2. Treatment: physical therapy.  3. Medication: none   NOTE: When ordering a medication, [place] Rules require Work Comp or WC on prescriptions.    4. No work from 2018 to 2018.  5. Return to work date: 2018   ** WITH RESTRICTIONS? Yes, with work restrictions: * Restricted lifting - Maximum lift in pounds: 40 lbs., maximum pushing/pulling 40 lbs. DURATION OF LIMITATIONS: 1 month       RESTRICTIONS: Unlimited unless listed.  Restrictions apply to home and leisure also.  If work restrictions is not available, the employee is totally disabled.    Maximum Medical Improvement (Date): no   Any Permanent Partial Disability? Deferred to future exam/consult.    Provider comments:      Medical Examiner: Carolyne Cornejo MD           License or registration: [place] [id number]     Next appointment: 1 month    CC: Employer, Managed Care Plan/Payor, Patient

## 2018-05-21 NOTE — PATIENT INSTRUCTIONS
Call the imaging center at [phone] or  [phone] to schedule your neck MRI.    [facility]    If you have any questions regarding to your visit please contact your care team:       Team Red:   Clinic Hours Telephone Number   Dr. Carolyne Lynne, NP   7am-7pm  Monday - Thursday   7am-5pm  Fridays  [phone]  (Appointment scheduling available 24/7)    Questions about your recent visit?   Team Line  [phone]   Urgent Care - [place] and [facility] - 11am-9pm Monday-Friday Saturday-Sunday- 9am-5pm   [place] - 5pm-9pm Monday-Friday Saturday-Sunday- 9am-5pm  [phone] - [place]  [phone] - [place]       What options do I have for a visit other than an office visit? We offer electronic visits (e-visits) and telephone visits, when medically appropriate.  Please check with your medical insurance to see if these types of visits are covered, as you will be responsible for any charges that are not paid by your insurance.      You can use [employer] (secure electronic communication) to access to your chart, send your primary care provider a message, or make an appointment. Ask a team member how to get started.     For a price quote for your services, please call our Consumer Price Line at [phone] or our Imaging Cost estimation line at [phone] (for imaging tests).  Sonja GOULD MA    The 10-year ASCVD risk score ([place] PB Jr, et al., 2013) is: 14.6%    Values used to calculate the score:      Age: 63 years      Sex: Male      Is Non- : No      Diabetic: No      Tobacco smoker: Yes      Systolic Blood Pressure: 134 mmHg      Is BP treated: Yes      HDL Cholesterol: 91 mg/dL      Total Cholesterol: 216 mg/dL                         Smoking: A Major Threat to Health   Smoking and health   Smoking is the number one health hazard in our country. And as smokers grow older, they are more likely to:    Suffer crippling poor health.   Die earlier than nonsmokers.   More and more people have quit smoking. Studies show that even if you are a heavy smoker, it can still help you to quit.   Smoking and cancer   Smoking is the main cause of lung cancer. The earlier you start smoking and the more you smoke each day, the higher your chances of getting lung cancer. It happens most often when people are between 60 and 70 years of age. Smoking can also lead to cancers of the:   Mouth and lip.   Throat and windpipe.   Kidney and bladder.   Pancreas and stomach.   Smoking and lung disease   Chronic bronchitis (inflammation of air tubes) and emphysema (damage to lung tissue) are both caused by smoking. These are both forms of lung disease. People often have both at the same time. Lung disease is a common cause of pain and death in older people.   Smoking and heart disease   Smoking greatly increases your chance of getting heart disease. The more you smoke, the greater your chances of heart disease. Smoking narrows the arteries that carry blood to your heart muscle. They are more likely to become blocked and cause heart disease.   Smoking and stroke   Arteries to the brain can also become narrowed and blocked in people who smoke. A stroke happens when the blood supply to part of the brain is cut off. Strokes often cause death. Even if the stroke is not fatal, it may leave you unable to care for yourself.   Smoking and other blood flow problems   Smoking can narrow other arteries, cutting off the supply of oxygen and other things your body needs. If it happens in your legs, you may have pain when you walk. It could cause sores on your skin and gangrene (tissue death). Sometimes the foot or leg has to be removed.   Smoking also makes your blood pressure higher. This can damage your kidneys. High blood pressure also gives you a higher chance of heart failure, heart attacks, and stroke.   Smoking increases your chances of  complications and slow healing if you have to have surgery.   There are no good reasons to smoke.   It's a great idea to quit, no matter what your age.   There are programs and medicines to help you quit.     Published by [employer].  This content is reviewed periodically and is subject to change as new health information becomes available. The information is intended to inform and educate and is not a replacement for medical evaluation, advice, diagnosis or treatment by a healthcare professional.   Developed by Myra Us MD, for [employer].   ? 2010 [employer] and/or its affiliates. All Rights Reserved.   Copyright   [employer] 2011

## 2018-05-21 NOTE — PROGRESS NOTES
"  SUBJECTIVE:   Kailash Murray is a 63 year old male who presents to clinic today for the following health issues:    Arm Pain    Onset: 1 month    Description:   Location: Left upper arm  Character: Stabbing    Intensity: severe    Progression of Symptoms: worse    Accompanying Signs & Symptoms:  Other symptoms: none    History:   Previous similar pain: last visit 5/11/18     Precipitating factors:   Trauma or overuse: no     Alleviating factors:  Improved by: activity    Therapies Tried and outcome: physical therapist at work / ice    I have reviewed the patient's medical history in detail and updated the computerized patient record.     ROS:  CONSTITUTIONAL: NEGATIVE for fever, chills, change in weight  INTEGUMENTARY/SKIN: NEGATIVE for worrisome rashes, moles or lesions  RESP: chronic coughing/wheezing   CV: NEGATIVE for chest pain/chest pressure  MUSCULOSKELETAL: as above; no neck pain   NEURO: NEGATIVE for weakness, dizziness or paresthesias  PSYCHIATRIC: NEGATIVE for changes in mood or affect    OBJECTIVE:     /60  Pulse 77  Temp 97.3  F (36.3  C) (Oral)  Resp 25  Ht 5' 8\" (1.727 m)  Wt 150 lb (68 kg)  SpO2 97%  BMI 22.81 kg/m2  Body mass index is 22.81 kg/(m^2).  GENERAL: alert and no distress  EYES: Eyes grossly normal to inspection, PERRL and conjunctivae and sclerae normal  HENT: ear canals and TM's normal, nose and mouth without ulcers or lesions  NECK: no adenopathy, no asymmetry, masses, or scars and thyroid normal to palpation  RESP: expiratory wheezes throughout   CV: regular rate and rhythm, normal S1 S2, no S3 or S4, no murmur, click or rub   MS: no gross musculoskeletal defects noted, no edema  NEURO: Normal strength and tone, mentation intact and speech normal  PSYCH: mentation appears normal, affect normal/bright    Diagnostic Test Results:  Results for orders placed or performed in visit on 05/11/18   CT Chest w/o Contrast    Narrative    CT CHEST WITHOUT CONTRAST5/11/2018 12:51 " PM    HISTORY: left arm and neck pain in smoker. rule out pancoast tumor;  Pain in joint involving upper arm, left; Smoker    TECHNIQUE: Axial images from thoracic inlet to diaphragm. Without  contrast  Radiation dose for this scan was reduced using automated  exposure control, adjustment of the mA and/or kV according to patient  size, or iterative reconstruction technique.    COMPARISON: 6/20/2011 CT chest    FINDINGS:   CHEST: Small calcified subcarinal nodes and right hilar nodes  consistent with granulomatous disease . Within the limits of a  noncontrast CT there is no evidence for mediastinal or hilar or  axillary adenopathy. Atherosclerotic calcification of thoracic aorta  and coronary artery calcification. No pleural effusion. Minimal  interstitial thickening in the medial and anterior mid lungs for  example medial right upper lobe series 3 image 29 but no evidence for  pneumonia or pleural effusion or pulmonary mass. Adrenal glands appear  normal. No aggressive bone lesions.      Impression    IMPRESSION:   1. No evidence for Pancoast tumor or acute pulmonary disease. Minimal  areas of subtle interstitial thickening.    KATJA MARROQUIN MD       ASSESSMENT/PLAN:   (M79.602) Pain of left upper extremity  (primary encounter diagnosis)  Comment: suspicious for radiculopathy; normal shoulder examination and chest CT   Plan: XR Cervical Spine 2/3 Views, MR Cervical Spine         w/o Contrast        Over the counter pain medication as needed, ice or heat as he finds helpful, avoidance of aggravating activities, physical therapy as scheduled, and return for persistence for consideration of sports medicine referral.     (R06.2) Wheezing  Comment: suspect COPD   Plan: Spirometry, Breathing Capacity          (I50.32) Chronic diastolic heart failure (H)  Plan: BETA BLOCKER NOT PRESCRIBED, INTENTIONAL,          (I10) Benign essential hypertension  Plan: continue medications and follow-up with PCP as planned      See  Patient Instructions    Carolyne Cornejo MD  [facility] [place]\

## 2018-05-21 NOTE — MR AVS SNAPSHOT
After Visit Summary   5/21/2018    Kailash Murray    MRN: [id number]           Patient Information     Date Of Birth          1955        Visit Information        Provider Department      5/21/2018 1:40 PM Carolyne Cornejo MD [facility]        Today's Diagnoses     Pain of left upper extremity    -  1    Wheezing        Chronic diastolic heart failure (H)        Benign essential hypertension          Care Instructions    Call the imaging center at [phone] or  [phone] to schedule your neck MRI.    [facility]    If you have any questions regarding to your visit please contact your care team:       Team Red:   Clinic Hours Telephone Number   Dr. Carolyne Lynne, NP   7am-7pm  Monday - Thursday   7am-5pm  Fridays  [phone]  (Appointment scheduling available 24/7)    Questions about your recent visit?   Team Line  [phone]   Urgent Care - [place] [facility] - 11am-9pm Monday-Friday Saturday-Sunday- 9am-5pm   [place] - 5pm-9pm Monday-Friday Saturday-Sunday- 9am-5pm  [phone] - Bettina Mathur  [phone] - [place]       What options do I have for a visit other than an office visit? We offer electronic visits (e-visits) and telephone visits, when medically appropriate.  Please check with your medical insurance to see if these types of visits are covered, as you will be responsible for any charges that are not paid by your insurance.      You can use [employer] (secure electronic communication) to access to your chart, send your primary care provider a message, or make an appointment. Ask a team member how to get started.     For a price quote for your services, please call our Consumer Price Line at [phone] or our Imaging Cost estimation line at [phone] (for imaging tests).  Sonja GOULD MA    The 10-year ASCVD risk score (Baltazaryazmin AMBRIZ Jr, et al., 2013) is: 14.6%    Values used to  calculate the score:      Age: 63 years      Sex: Male      Is Non- : No      Diabetic: No      Tobacco smoker: Yes      Systolic Blood Pressure: 134 mmHg      Is BP treated: Yes      HDL Cholesterol: 91 mg/dL      Total Cholesterol: 216 mg/dL                         Smoking: A Major Threat to Health   Smoking and health   Smoking is the number one health hazard in our country. And as smokers grow older, they are more likely to:   Suffer crippling poor health.   Die earlier than nonsmokers.   More and more people have quit smoking. Studies show that even if you are a heavy smoker, it can still help you to quit.   Smoking and cancer   Smoking is the main cause of lung cancer. The earlier you start smoking and the more you smoke each day, the higher your chances of getting lung cancer. It happens most often when people are between 60 and 70 years of age. Smoking can also lead to cancers of the:   Mouth and lip.   Throat and windpipe.   Kidney and bladder.   Pancreas and stomach.   Smoking and lung disease   Chronic bronchitis (inflammation of air tubes) and emphysema (damage to lung tissue) are both caused by smoking. These are both forms of lung disease. People often have both at the same time. Lung disease is a common cause of pain and death in older people.   Smoking and heart disease   Smoking greatly increases your chance of getting heart disease. The more you smoke, the greater your chances of heart disease. Smoking narrows the arteries that carry blood to your heart muscle. They are more likely to become blocked and cause heart disease.   Smoking and stroke   Arteries to the brain can also become narrowed and blocked in people who smoke. A stroke happens when the blood supply to part of the brain is cut off. Strokes often cause death. Even if the stroke is not fatal, it may leave you unable to care for yourself.   Smoking and other blood flow problems   Smoking can narrow other arteries,  cutting off the supply of oxygen and other things your body needs. If it happens in your legs, you may have pain when you walk. It could cause sores on your skin and gangrene (tissue death). Sometimes the foot or leg has to be removed.   Smoking also makes your blood pressure higher. This can damage your kidneys. High blood pressure also gives you a higher chance of heart failure, heart attacks, and stroke.   Smoking increases your chances of complications and slow healing if you have to have surgery.   There are no good reasons to smoke.   It's a great idea to quit, no matter what your age.   There are programs and medicines to help you quit.     Published by [employer].  This content is reviewed periodically and is subject to change as new health information becomes available. The information is intended to inform and educate and is not a replacement for medical evaluation, advice, diagnosis or treatment by a healthcare professional.   Developed by Myra Us MD, for [employer].   ? 2010 [employer] and/or its affiliates. All Rights Reserved.   Copyright   [employer] Reference Systems 2011                Follow-ups after your visit        Follow-up notes from your care team     Return if symptoms worsen or fail to improve, for physical (fasting labs up to one week prior).      Your next 10 appointments already scheduled     May 25, 2018 10:20 AM CDT   (Arrive by 10:05 AM)   [facility] Extremity with Elliot Jimenez, PT   [facility]    [address]  [address]  [place] [address]   [phone]            Jun 08, 2018   Procedure with William Charles Du[place]   [facility] [place]    [address] [facility] [id number] [phone]              Future tests that were ordered for you today     Open Future Orders        Priority Expected Expires Ordered    XR Cervical Spine 2/3 Views Routine 5/21/2018 5/21/2019 5/21/2018    MR Cervical Spine w/o Contrast Routine   "5/21/2019 5/21/2018            Who to contact     If you have questions or need follow up information about today's clinic visit or your schedule please contact [facility] directly at [phone].  Normal or non-critical lab and imaging results will be communicated to you by [employer], letter or phone within 4 business days after the clinic has received the results. If you do not hear from us within 7 days, please contact the clinic through [employer] or phone. If you have a critical or abnormal lab result, we will notify you by phone as soon as possible.  Submit refill requests through [employer] or call your pharmacy and they will forward the refill request to us. Please allow 3 business days for your refill to be completed.          Additional Information About Your Visit        [employer] Information     [employer] gives you secure access to your electronic health record. If you see a primary care provider, you can also send messages to your care team and make appointments. If you have questions, please call your primary care clinic.  If you do not have a primary care provider, please call [phone] and they will assist you.        Care EveryWhere ID     This is your Care EveryWhere ID. This could be used by other organizations to access your [place] medical records  [id number]        Your Vitals Were     Pulse Temperature Respirations Height Pulse Oximetry BMI (Body Mass Index)    77 97.3  F (36.3  C) (Oral) 25 5' 8\" (1.727 m) 97% 22.81 kg/m2       Blood Pressure from Last 3 Encounters:   05/21/18 134/60   05/11/18 136/82   04/14/17 124/80    Weight from Last 3 Encounters:   05/21/18 150 lb (68 kg)   05/11/18 152 lb 8 oz (69.2 kg)   04/14/17 160 lb (72.6 kg)              We Performed the Following     Spirometry, Breathing Capacity          Today's Medication Changes          These changes are accurate as of 5/21/18  2:47 PM.  If you have any questions, ask your nurse or doctor.               Start " taking these medicines.        Dose/Directions    BETA BLOCKER NOT PRESCRIBED (INTENTIONAL)   Used for:  Chronic diastolic heart failure (H)   Started by:  Carolyne Cornejo MD        Beta Blocker not prescribed intentionally due to EF > 40 % (ejection fraction)   Refills:  0            Where to get your medicines      Some of these will need a paper prescription and others can be bought over the counter.  Ask your nurse if you have questions.     You don't need a prescription for these medications     BETA BLOCKER NOT PRESCRIBED (INTENTIONAL)                Primary Care Provider Office Phone # Fax #    Jazzmine Vitale [phone] [address] [facility] [address] [facility] [phone]        Equal Access to Services     [facility] : Hadii patricio ballo Soюлия, waaxda luqadaha, qaybta kaalmada adesadieyajonnathan, maricruz camp . So [facility] [phone].    ATENCIÓN: Si habla español, tiene a doyle disposición servicios gratuitos de asistencia lingüística. Llame al [phone].    We comply with applicable federal civil rights laws and [place] laws. We do not discriminate on the basis of race, color, national origin, age, disability, sex, sexual orientation, or gender identity.            Thank you!     Thank you for choosing [facility]  for your care. Our goal is always to provide you with excellent care. Hearing back from our patients is one way we can continue to improve our services. Please take a few minutes to complete the written survey that you may receive in the mail after your visit with us. Thank you!             Your Updated Medication List - Protect others around you: Learn how to safely use, store and throw away your medicines at www.disposemymeds.org.          This list is accurate as of 5/21/18  2:47 PM.  Always use your most recent med list.                   Brand Name Dispense Instructions for use Diagnosis    amLODIPine 5 MG tablet    NORVASC     90 tablet    Take 1 tablet (5 mg) by mouth daily    Benign essential hypertension       aspirin 81 MG tablet     30 tablet    Take 1 tablet (81 mg) by mouth daily    Hypertension goal BP (blood pressure) < 140/90       BETA BLOCKER NOT PRESCRIBED (INTENTIONAL)      Beta Blocker not prescribed intentionally due to EF > 40 % (ejection fraction)    Chronic diastolic heart failure (H)       losartan 100 MG tablet    COZAAR    90 tablet    Take 1 tablet (100 mg) by mouth daily    Benign essential hypertension       Magnesium Citrate 125 MG Caps           Multi-vitamin Tabs tablet      Take 1 tablet by mouth daily

## 2018-05-22 ENCOUNTER — TELEPHONE (OUTPATIENT)
Dept: FAMILY MEDICINE | Facility: CLINIC | Age: 63
End: 2018-05-22

## 2018-05-22 ASSESSMENT — PATIENT HEALTH QUESTIONNAIRE - PHQ9: SUM OF ALL RESPONSES TO PHQ QUESTIONS 1-9: 0

## 2018-05-22 NOTE — PROGRESS NOTES
Please call patient:  You do have mild emphysema due to smoking. This is also called COPD (Chronic Obstructive Pulmonary Disease). Follow-up for a complete physical with fasting labs. We can discuss treatment options.     Carolyne Cornejo MD

## 2018-05-22 NOTE — TELEPHONE ENCOUNTER
Spirometry, Breathing Capacity   Status:  Final result   Visible to patient:  No (Not Released)   Dx:  Wheezing Order: [id number]       Notes Recorded by Missy Woodward RN on 5/22/2018 at 10:56 AM  Message left on VM to return call to RN hotline at [phone].   Missy Woodward RN   TE started  ------    Notes Recorded by Carolyne Cornejo MD on 5/22/2018 at 8:51 AM  Please call patient:  You do have mild emphysema due to smoking. This is also called COPD (Chronic Obstructive Pulmonary Disease). Follow-up for a complete physical with fasting labs. We can discuss treatment options.     Carolyne Cornejo MD

## 2018-05-22 NOTE — TELEPHONE ENCOUNTER
Patient and patient wife notified of providers message as written.  Patient asked if this could be done at preop, advised that physical and preop needed to be separate.  Appointment scheduled.   Jennifer Stone RN

## 2018-05-22 NOTE — PROGRESS NOTES
Kailash,    You do have moderately severe arthritis in your neck. Let's proceed with MRI as planned.     Carolyne Cornejo MD

## 2018-05-22 NOTE — PATIENT INSTRUCTIONS
Before Your Surgery      Call your surgeon if there is any change in your health. This includes signs of a cold or flu (such as a sore throat, runny nose, cough, rash or fever).    Do not smoke, drink alcohol or take over the counter medicine (unless your surgeon or primary care doctor tells you to) for the 24 hours before and after surgery.    If you take prescribed drugs: Follow your doctor s orders about which medicines to take and which to stop until after surgery.    Eating and drinking prior to surgery: follow the instructions from your surgeon    Take a shower or bath the night before surgery. Use the soap your surgeon gave you to gently clean your skin. If you do not have soap from your surgeon, use your regular soap. Do not shave or scrub the surgery site.  Wear clean pajamas and have clean sheets on your bed.     [facility] [place]    If you have any questions regarding to your visit please contact your care team:       Team Purple:   Clinic Hours Telephone Number   Dr. Jazzmine Rodriguez   7am-7pm  Monday - Thursday   7am-5pm  Fridays  [phone]  (Appointment scheduling available 24/7)    Questions about your recent visit?   Team Line:  [phone]   Urgent Care - [place] and [facility] - 11am-9pm Monday-Friday Saturday-Sunday- 9am-5pm   [place] - 5pm-9pm Monday-Friday Saturday-Sunday- 9am-5pm  [phone] - [place]  [phone] - [place]       What options do I have for a visit other than an office visit? We offer electronic visits (e-visits) and telephone visits, when medically appropriate.  Please check with your medical insurance to see if these types of visits are covered, as you will be responsible for any charges that are not paid by your insurance.      You can use [email or web address] (secure electronic communication) to access to your chart, send your primary care provider a message, or make an appointment. Ask a team member how  to get started.     For a price quote for your services, please call our Consumer Price Line at [phone] or our Imaging Cost estimation line at [phone] (for imaging tests).
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
## 2018-05-23 ASSESSMENT — PATIENT HEALTH QUESTIONNAIRE - PHQ9
SUM OF ALL RESPONSES TO PHQ QUESTIONS 1-9: 23
10. IF YOU CHECKED OFF ANY PROBLEMS, HOW DIFFICULT HAVE THESE PROBLEMS MADE IT FOR YOU TO DO YOUR WORK, TAKE CARE OF THINGS AT HOME, OR GET ALONG WITH OTHER PEOPLE: EXTREMELY DIFFICULT

## 2018-05-23 NOTE — PATIENT INSTRUCTIONS
[facility]    If you have any questions regarding to your visit please contact your care team:       Team Purple:   Clinic Hours Telephone Number   Dr. Jazzmine Rodriguez   7am-7pm  Monday - Thursday   7am-5pm  Fridays  [phone]  (Appointment scheduling available 24/7)    Questions about your recent visit?   Team Line:  [phone]   Urgent Care - [place] and [facility] - 11am-9pm Monday-Friday Saturday-Sunday- 9am-5pm   [place] - 5pm-9pm Monday-Friday Saturday-Sunday- 9am-5pm  [phone] - [place]  [phone] - [place]       What options do I have for a visit other than an office visit? We offer electronic visits (e-visits) and telephone visits, when medically appropriate.  Please check with your medical insurance to see if these types of visits are covered, as you will be responsible for any charges that are not paid by your insurance.      You can use MyCityWay (secure electronic communication) to access to your chart, send your primary care provider a message, or make an appointment. Ask a team member how to get started.     For a price quote for your services, please call our Consumer Price Line at [phone] or our Imaging Cost estimation line at [phone] (for imaging tests).    Alirio Talavera    Preventive Health Recommendations  Male Ages 50   64    Yearly exam:             See your health care provider every year in order to  o   Review health changes.   o   Discuss preventive care.    o   Review your medicines if your doctor has prescribed any.     Have a cholesterol test every 5 years, or more frequently if you are at risk for high cholesterol/heart disease.     Have a diabetes test (fasting glucose) every three years. If you are at risk for diabetes, you should have this test more often.     Have a colonoscopy at age 50, or have a yearly FIT test (stool test). These exams will check for colon cancer.       Talk with your health care provider about whether or not a prostate cancer screening test (PSA) is right for you.    You should be tested each year for STDs (sexually transmitted diseases), if you re at risk.     Shots: Get a flu shot each year. Get a tetanus shot every 10 years.     Nutrition:    Eat at least 5 servings of fruits and vegetables daily.     Eat whole-grain bread, whole-wheat pasta and brown rice instead of white grains and rice.     Talk to your provider about Calcium and Vitamin D.     Lifestyle    Exercise for at least 150 minutes a week (30 minutes a day, 5 days a week). This will help you control your weight and prevent disease.     Limit alcohol to one drink per day.     No smoking.     Wear sunscreen to prevent skin cancer.     See your dentist every six months for an exam and cleaning.     See your eye doctor every 1 to 2 years.

## 2018-05-24 ENCOUNTER — OFFICE VISIT (OUTPATIENT)
Dept: FAMILY MEDICINE | Facility: CLINIC | Age: 63
End: 2018-05-24
Payer: COMMERCIAL

## 2018-05-24 VITALS
HEIGHT: 68 IN | WEIGHT: 148.8 LBS | RESPIRATION RATE: 18 BRPM | BODY MASS INDEX: 22.55 KG/M2 | HEART RATE: 72 BPM | SYSTOLIC BLOOD PRESSURE: 140 MMHG | TEMPERATURE: 98.2 F | DIASTOLIC BLOOD PRESSURE: 80 MMHG | OXYGEN SATURATION: 95 %

## 2018-05-24 DIAGNOSIS — Z01.818 PREOP GENERAL PHYSICAL EXAM: Primary | ICD-10-CM

## 2018-05-24 DIAGNOSIS — E78.5 HYPERLIPIDEMIA LDL GOAL <130: ICD-10-CM

## 2018-05-24 DIAGNOSIS — I10 BENIGN ESSENTIAL HYPERTENSION: ICD-10-CM

## 2018-05-24 LAB
ALBUMIN SERPL-MCNC: 4.5 G/DL (ref 3.4–5)
ALP SERPL-CCNC: 64 U/L (ref 40–150)
ALT SERPL W P-5'-P-CCNC: 64 U/L (ref 0–70)
ANION GAP SERPL CALCULATED.3IONS-SCNC: 5 MMOL/L (ref 3–14)
AST SERPL W P-5'-P-CCNC: 78 U/L (ref 0–45)
BILIRUB SERPL-MCNC: 1 MG/DL (ref 0.2–1.3)
BUN SERPL-MCNC: 22 MG/DL (ref 7–30)
CALCIUM SERPL-MCNC: 10 MG/DL (ref 8.5–10.1)
CHLORIDE SERPL-SCNC: 101 MMOL/L (ref 94–109)
CHOLEST SERPL-MCNC: 244 MG/DL
CO2 SERPL-SCNC: 30 MMOL/L (ref 20–32)
CREAT SERPL-MCNC: 0.68 MG/DL (ref 0.66–1.25)
GFR SERPL CREATININE-BSD FRML MDRD: >90 ML/MIN/1.7M2
GLUCOSE SERPL-MCNC: 120 MG/DL (ref 70–99)
HDLC SERPL-MCNC: 116 MG/DL
LDLC SERPL CALC-MCNC: 114 MG/DL
NONHDLC SERPL-MCNC: 128 MG/DL
POTASSIUM SERPL-SCNC: 4 MMOL/L (ref 3.4–5.3)
PROT SERPL-MCNC: 8.3 G/DL (ref 6.8–8.8)
SODIUM SERPL-SCNC: 136 MMOL/L (ref 133–144)
TRIGL SERPL-MCNC: 68 MG/DL

## 2018-05-24 PROCEDURE — 93000 ELECTROCARDIOGRAM COMPLETE: CPT | Performed by: FAMILY MEDICINE

## 2018-05-24 PROCEDURE — 80053 COMPREHEN METABOLIC PANEL: CPT | Performed by: FAMILY MEDICINE

## 2018-05-24 PROCEDURE — 80061 LIPID PANEL: CPT | Performed by: FAMILY MEDICINE

## 2018-05-24 PROCEDURE — 99214 OFFICE O/P EST MOD 30 MIN: CPT | Performed by: FAMILY MEDICINE

## 2018-05-24 PROCEDURE — 36415 COLL VENOUS BLD VENIPUNCTURE: CPT | Performed by: FAMILY MEDICINE

## 2018-05-24 RX ORDER — AMLODIPINE BESYLATE 5 MG/1
5 TABLET ORAL DAILY
Qty: 90 TABLET | Refills: 4 | Status: SHIPPED | OUTPATIENT
Start: 2018-05-24 | End: 2018-10-08 | Stop reason: DRUGHIGH

## 2018-05-24 RX ORDER — LOSARTAN POTASSIUM 100 MG/1
100 TABLET ORAL DAILY
Qty: 90 TABLET | Refills: 4 | Status: SHIPPED | OUTPATIENT
Start: 2018-05-24 | End: 2019-04-23

## 2018-05-24 NOTE — MR AVS SNAPSHOT
After Visit Summary   5/24/2018    Kailash Murray    MRN: [id number]           Patient Information     Date Of Birth          1955        Visit Information        Provider Department      5/24/2018 1:30 PM Jazzmine Vitale MD [facility]        Today's Diagnoses     Preop general physical exam    -  1    Benign essential hypertension          Care Instructions      Before Your Surgery      Call your surgeon if there is any change in your health. This includes signs of a cold or flu (such as a sore throat, runny nose, cough, rash or fever).    Do not smoke, drink alcohol or take over the counter medicine (unless your surgeon or primary care doctor tells you to) for the 24 hours before and after surgery.    If you take prescribed drugs: Follow your doctor s orders about which medicines to take and which to stop until after surgery.    Eating and drinking prior to surgery: follow the instructions from your surgeon    Take a shower or bath the night before surgery. Use the soap your surgeon gave you to gently clean your skin. If you do not have soap from your surgeon, use your regular soap. Do not shave or scrub the surgery site.  Wear clean pajamas and have clean sheets on your bed.     [facility]    If you have any questions regarding to your visit please contact your care team:       Team Purple:   Clinic Hours Telephone Number   Dr. Jazzmine Rodriguez   7am-7pm  Monday - Thursday   7am-5pm  Fridays  [phone]  (Appointment scheduling available 24/7)    Questions about your recent visit?   Team Line:  [phone]   Urgent Care - [place] - 11am-9pm Monday-Friday Saturday-Sunday- 9am-5pm   [place] - 5pm-9pm Monday-Friday Saturday-Sunday- 9am-5pm  [phone] - [place]  [phone] - [place]       What options do I have for a visit other than an office visit? We offer electronic visits  (e-visits) and telephone visits, when medically appropriate.  Please check with your medical insurance to see if these types of visits are covered, as you will be responsible for any charges that are not paid by your insurance.      You can use Fetchnotes (secure electronic communication) to access to your chart, send your primary care provider a message, or make an appointment. Ask a team member how to get started.     For a price quote for your services, please call our Consumer Price Line at [phone] or our Imaging Cost estimation line at [phone] (for imaging tests).              Follow-ups after your visit        Your next 10 appointments already scheduled     May 25, 2018  9:20 AM CDT   MyChart Physical Adult with Sanchez Mallory MD   [facility] ([facility])    [address] [address]   [phone]            May 25, 2018 10:20 AM CDT   (Arrive by 10:05 AM)   KARLA Extremity with Elliot Jimenez, PT   [facility] ([facility])    [address]  [address]  [facility] [address]   [phone]            Jun 08, 2018   Procedure with William Charles Duane, MD   [facility] (--)    [address] [facility] [id number] [phone]              Who to contact     If you have questions or need follow up information about today's clinic visit or your schedule please contact [facility] directly at [phone].  Normal or non-critical lab and imaging results will be communicated to you by Humanoidhart, letter or phone within 4 business days after the clinic has received the results. If you do not hear from us within 7 days, please contact the clinic through Humanoidhart or phone. If you have a critical or abnormal lab result, we will notify you by phone as soon as possible.  Submit refill requests through Fetchnotes or call your pharmacy and they will forward the refill request to us. Please allow 3 business days for  "your refill to be completed.          Additional Information About Your Visit        MyChart Information     Larky gives you secure access to your electronic health record. If you see a primary care provider, you can also send messages to your care team and make appointments. If you have questions, please call your primary care clinic.  If you do not have a primary care provider, please call [phone] and they will assist you.        Care EveryWhere ID     This is your Care EveryWhere ID. This could be used by other organizations to access your [place] medical records  [id number]        Your Vitals Were     Pulse Temperature Respirations Height Pulse Oximetry BMI (Body Mass Index)    72 98.2  F (36.8  C) (Oral) 18 5' 8\" (1.727 m) 95% 22.62 kg/m2       Blood Pressure from Last 3 Encounters:   05/24/18 140/80   05/21/18 134/60   05/11/18 136/82    Weight from Last 3 Encounters:   05/24/18 148 lb 12.8 oz (67.5 kg)   05/21/18 150 lb (68 kg)   05/11/18 152 lb 8 oz (69.2 kg)              We Performed the Following     EKG 12-lead complete w/read - Clinics          Where to get your medicines      These medications were [facility] to [id number] [place] - [address]  [address] [place] [address]     Phone:  [phone]     amLODIPine 5 MG tablet    losartan 100 MG tablet          Primary Care Provider Office Phone # Fax #    Jazzmine Vitale [phone] [address] [address]        Equal Access to Services     [facility]: Hadii aad ku hadasho Soomaali, waaxda luqadaha, qaybta kaalmada maricruz kirk. So [facility] [phone].    ATENCIÓN: Si habla español, tiene a doyle disposición servicios gratuitos de asistencia lingüística. Pardeepame al [phone].    We comply with applicable federal civil rights laws and [place] laws. We do not discriminate on the basis of race, color, national origin, age, disability, " sex, sexual orientation, or gender identity.            Thank you!     Thank you for choosing [facility]  for your care. Our goal is always to provide you with excellent care. Hearing back from our patients is one way we can continue to improve our services. Please take a few minutes to complete the written survey that you may receive in the mail after your visit with us. Thank you!             Your Updated Medication List - Protect others around you: Learn how to safely use, store and throw away your medicines at www.disposemymeds.org.          This list is accurate as of 5/24/18  1:57 PM.  Always use your most recent med list.                   Brand Name Dispense Instructions for use Diagnosis    amLODIPine 5 MG tablet    NORVASC    90 tablet    Take 1 tablet (5 mg) by mouth daily    Benign essential hypertension       aspirin 81 MG tablet     30 tablet    Take 1 tablet (81 mg) by mouth daily    Hypertension goal BP (blood pressure) < 140/90       BETA BLOCKER NOT PRESCRIBED (INTENTIONAL)      Beta Blocker not prescribed intentionally due to EF > 40 % (ejection fraction)    Chronic diastolic heart failure (H)       losartan 100 MG tablet    COZAAR    90 tablet    Take 1 tablet (100 mg) by mouth daily    Benign essential hypertension       Magnesium Citrate 125 MG Caps           Multi-vitamin Tabs tablet      Take 1 tablet by mouth daily
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
## 2018-05-24 NOTE — LETTER
"My Heart Failure Action Plan   Name: Kailash Murray    YOB: 1955   Date: 5/22/2018    My doctor: Jazzmine Vitale     [address]  [place] [id number] [phone]  My Diagnosis: {HF TYPE:644437::\"Systolic Heart Failure\"}   My Ejection Fraction: {EF%:900858}    My Exercise Goal: 30 minutes daily  .     My Weight Goal: ***  Wt Readings from Last 2 Encounters:   05/21/18 150 lb (68 kg)   05/11/18 152 lb 8 oz (69.2 kg)     Weigh yourself daily using the same scale. If you gain more than 2 pounds in 24 hours or 5 pounds in a week {HF WEIGHT GOAL:260579}    My Diet Goal: {HF DIET GOAL:382744::\"No added salt\"}    Emergency Room Visits:    Our goal is to improve your quality of life and help you avoid a visit to the emergency room or hospital.  If we work together, we can achieve this goal. But, if you feel you need to call 911 or go to the emergency room, please do so.  If you go to the emergency room, please bring your list of medicines and your daily weight chart with you.       GREEN ZONE     Doing well today    Weight gained is no more than 2 pounds a day or 5 pounds a week.    No swelling in feet, ankles, legs or stomach.    No more swelling than usual.    No more trouble breathing than usual.    No change in my sleep.    No other problems. Actions:    I am doing fine.  I will take my medicine, follow my diet, see my doctor, exercise, and watch for symptoms.           YELLOW ZONE         Having a bad day or flare up    Weight gain of more than 2 pounds in one day or 5 pounds in one week.    New swelling in ankle, leg, knee or thigh.    Bloating in belly, pants feel tighter.    Swelling in hands or face.    Coughing or trouble breathing while walking or talking.    Harder to breathe last night.    Have trouble sleeping, wake up short of breath.    Much more tired than usual.    Not eating.    Pain in my chest or bad leg cramps.    Feel weak or dizzy. " Actions:    I need to take action and call my doctor or nurse today.                 RED ZONE         Need medical care now    Weight gain of 5 pounds overnight.    Chest pain or pressure that does not go away.    Feel less alert.    Wheezing or have trouble breathing when at rest.    Cannot sleep lying down.    Cannot take my water pill.    Pass out or faint. Actions:    I need to call my doctor or nurse now!    Call 911 if I have chest pain or cannot breathe.

## 2018-05-25 ENCOUNTER — OFFICE VISIT (OUTPATIENT)
Dept: FAMILY MEDICINE | Facility: CLINIC | Age: 63
End: 2018-05-25
Payer: COMMERCIAL

## 2018-05-25 ENCOUNTER — THERAPY VISIT (OUTPATIENT)
Dept: PHYSICAL THERAPY | Facility: CLINIC | Age: 63
End: 2018-05-25
Payer: COMMERCIAL

## 2018-05-25 VITALS
OXYGEN SATURATION: 96 % | RESPIRATION RATE: 13 BRPM | HEIGHT: 68 IN | WEIGHT: 146.5 LBS | BODY MASS INDEX: 22.2 KG/M2 | HEART RATE: 75 BPM | TEMPERATURE: 99.3 F | DIASTOLIC BLOOD PRESSURE: 68 MMHG | SYSTOLIC BLOOD PRESSURE: 132 MMHG

## 2018-05-25 DIAGNOSIS — M25.512 LEFT SHOULDER PAIN: Primary | ICD-10-CM

## 2018-05-25 DIAGNOSIS — Z00.00 ENCOUNTER FOR ROUTINE ADULT HEALTH EXAMINATION WITHOUT ABNORMAL FINDINGS: Primary | ICD-10-CM

## 2018-05-25 DIAGNOSIS — Z11.4 SCREENING FOR HIV (HUMAN IMMUNODEFICIENCY VIRUS): ICD-10-CM

## 2018-05-25 DIAGNOSIS — Z12.11 SCREEN FOR COLON CANCER: ICD-10-CM

## 2018-05-25 PROCEDURE — 97161 PT EVAL LOW COMPLEX 20 MIN: CPT | Mod: GP | Performed by: PHYSICAL THERAPIST

## 2018-05-25 PROCEDURE — 99396 PREV VISIT EST AGE 40-64: CPT | Performed by: FAMILY MEDICINE

## 2018-05-25 PROCEDURE — 97110 THERAPEUTIC EXERCISES: CPT | Mod: GP | Performed by: PHYSICAL THERAPIST

## 2018-05-25 PROCEDURE — 97140 MANUAL THERAPY 1/> REGIONS: CPT | Mod: GP | Performed by: PHYSICAL THERAPIST

## 2018-05-25 NOTE — MR AVS SNAPSHOT
After Visit Summary   5/25/2018    Kailash Murray    MRN: [id number]           Patient Information     Date Of Birth          1955        Visit Information        Provider Department      5/25/2018 9:20 AM Sanchez Mallory MD [facility]        Today's Diagnoses     Encounter for routine adult health examination without abnormal findings    -  1    Screen for colon cancer        Screening for HIV (human immunodeficiency virus)          Care Instructions    [facility]    If you have any questions regarding to your visit please contact your care team:       Team Purple:   Clinic Hours Telephone Number   Dr. Jazzmine Rodriguez   7am-7pm  Monday - Thursday   7am-5pm  Fridays  [phone]  (Appointment scheduling available 24/7)    Questions about your recent visit?   Team Line:  [phone]   Urgent Care - [place] and [facility] - 11am-9pm Monday-Friday Saturday-Sunday- 9am-5pm   [place] - 5pm-9pm Monday-Friday Saturday-Sunday- 9am-5pm  [phone] - [place]  [phone] - [place]       What options do I have for a visit other than an office visit? We offer electronic visits (e-visits) and telephone visits, when medically appropriate.  Please check with your medical insurance to see if these types of visits are covered, as you will be responsible for any charges that are not paid by your insurance.      You can use [employer] (secure electronic communication) to access to your chart, send your primary care provider a message, or make an appointment. Ask a team member how to get started.     For a price quote for your services, please call our Consumer Price Line at [phone] or our Imaging Cost estimation line at [phone] (for imaging tests).    Alirio Talavera    Preventive Health Recommendations  Male Ages 50 - 64    Yearly exam:             See your health care provider every year in order  to  o   Review health changes.   o   Discuss preventive care.    o   Review your medicines if your doctor has prescribed any.     Have a cholesterol test every 5 years, or more frequently if you are at risk for high cholesterol/heart disease.     Have a diabetes test (fasting glucose) every three years. If you are at risk for diabetes, you should have this test more often.     Have a colonoscopy at age 50, or have a yearly FIT test (stool test). These exams will check for colon cancer.      Talk with your health care provider about whether or not a prostate cancer screening test (PSA) is right for you.    You should be tested each year for STDs (sexually transmitted diseases), if you re at risk.     Shots: Get a flu shot each year. Get a tetanus shot every 10 years.     Nutrition:    Eat at least 5 servings of fruits and vegetables daily.     Eat whole-grain bread, whole-wheat pasta and brown rice instead of white grains and rice.     Talk to your provider about Calcium and Vitamin D.     Lifestyle    Exercise for at least 150 minutes a week (30 minutes a day, 5 days a week). This will help you control your weight and prevent disease.     Limit alcohol to one drink per day.     No smoking.     Wear sunscreen to prevent skin cancer.     See your dentist every six months for an exam and cleaning.     See your eye doctor every 1 to 2 years.            Follow-ups after your visit        Your next 10 appointments already scheduled     May 25, 2018 10:20 AM CDT   (Arrive by 10:05 AM)   [facility] Extremity with Elliot Jimenez, PT   [facility]    [address]  [place] [address]   [phone]            May 30, 2018  2:45 PM CDT   MR CERVICAL SPINE W/O CONTRAST with BEMR1   [facility]    [address]  [place] [id number] [phone]           Take your medicines as usual, unless your doctor tells you not to. Bring a list of  your current medicines to your exam (including vitamins, minerals and over-the-counter drugs). Also bring the results of similar scans you may have had.  Please remove any body piercings and hair extensions before you arrive.  Follow your doctor s orders. If you do not, we may have to postpone your exam.  You may or may not receive IV contrast for this exam pending the discretion of the Radiologist.  You do not need to do anything special to prepare.  The MRI machine uses a strong magnet. Please wear clothes without metal (snaps, zippers). A sweatsuit works well, or we may give you a hospital gown.   **IMPORTANT** THE INSTRUCTIONS BELOW ARE ONLY FOR THOSE PATIENTS WHO HAVE BEEN PRESCRIBED SEDATION OR GENERAL ANESTHESIA DURING THEIR MRI PROCEDURE:  IF YOUR DOCTOR PRESCRIBED ORAL SEDATION (take medicine to help you relax during your exam):   You must get the medicine from your doctor (oral medication) before you arrive. Bring the medicine to the exam. Do not take it at home. You ll be told when to take it upon arriving for your exam.   Arrive one hour early. Bring someone who can take you home after the test. Your medicine will make you sleepy. After the exam, you may not drive, take a bus or take a taxi by yourself.  IF YOUR DOCTOR PRESCRIBED IV SEDATION:   Arrive one hour early. Bring someone who can take you home after the test. Your medicine will make you sleepy. After the exam, you may not drive, take a bus or take a taxi by yourself.   No eating 6 hours before your exam. You may have clear liquids up until 4 hours before your exam. (Clear liquids include water, clear tea, black coffee and fruit juice without pulp.)  IF YOUR DOCTOR PRESCRIBED ANESTHESIA (be asleep for your exam):   Arrive 1 1/2 hours early. Bring someone who can take you home after the test. You may not drive, take a bus or take a taxi by yourself.   No eating 8 hours before your exam. You may have clear liquids up until 4 hours before your exam.  "(Clear liquids include water, clear tea, black coffee and fruit juice without pulp.)   You will spend four to five hours in the recovery room.  Please call the Imaging Department at your exam site with any questions.            Jun 08, 2018   Procedure with William Charles Duane, MD   [facility] (--)    [address] [place] [address] [phone]              Who to contact     If you have questions or need follow up information about today's clinic visit or your schedule please contact [facility] directly at [phone].  Normal or non-critical lab and imaging results will be communicated to you by [employer], letter or phone within 4 business days after the clinic has received the results. If you do not hear from us within 7 days, please contact the clinic through B[employer] or phone. If you have a critical or abnormal lab result, we will notify you by phone as soon as possible.  Submit refill requests through [email or web address] or call your pharmacy and they will forward the refill request to us. Please allow 3 business days for your refill to be completed.          Additional Information About Your Visit        [employer] Information     [email or web address] gives you secure access to your electronic health record. If you see a primary care provider, you can also send messages to your care team and make appointments. If you have questions, please call your primary care clinic.  If you do not have a primary care provider, please call [phone] and they will assist you.        Care EveryWhere ID     This is your Care EveryWhere ID. This could be used by other organizations to access your [facility] medical records  [id number]        Your Vitals Were     Pulse Temperature Respirations Height Pulse Oximetry BMI (Body Mass Index)    75 99.3  F (37.4  C) (Oral) 13 5' 8\" (1.727 m) 96% 22.28 kg/m2       Blood Pressure from Last 3 Encounters:   05/25/18 132/68   05/24/18 140/80   05/21/18 134/60    " Weight from Last 3 Encounters:   05/25/18 146 lb 8 oz (66.5 kg)   05/24/18 148 lb 12.8 oz (67.5 kg)   05/21/18 150 lb (68 kg)              Today, you had the following     No orders found for display       Primary Care Provider Office Phone # Fax #    Jazzmine Vitale [phone] [address] [facility] [address]        Equal Access to Services     AYALA SHEETS : Hadii aad ku hadasho Soomaali, waaxda luqadaha, qaybta kaalmada adeegyada, waxay idiin hayaan adeeg kharash ladariusz . So [facility] [phone].    ATENCIÓN: Si habla español, tiene a doyle disposición servicios gratuitos de asistencia lingüística. [facility] [phone].    We comply with applicable federal civil rights laws and [place] laws. We do not discriminate on the basis of race, color, national origin, age, disability, sex, sexual orientation, or gender identity.            Thank you!     Thank you for choosing [facility]  for your care. Our goal is always to provide you with excellent care. Hearing back from our patients is one way we can continue to improve our services. Please take a few minutes to complete the written survey that you may receive in the mail after your visit with us. Thank you!             Your Updated Medication List - Protect others around you: Learn how to safely use, store and throw away your medicines at www.disposemymeds.org.          This list is accurate as of 5/25/18 10:12 AM.  Always use your most recent med list.                   Brand Name Dispense Instructions for use Diagnosis    amLODIPine 5 MG tablet    NORVASC    90 tablet    Take 1 tablet (5 mg) by mouth daily    Benign essential hypertension       aspirin 81 MG tablet     30 tablet    Take 1 tablet (81 mg) by mouth daily    Hypertension goal BP (blood pressure) < 140/90       BETA BLOCKER NOT PRESCRIBED (INTENTIONAL)      Beta Blocker not prescribed intentionally due to EF > 40 % (ejection fraction)    Chronic  diastolic heart failure (H)       losartan 100 MG tablet    COZAAR    90 tablet    Take 1 tablet (100 mg) by mouth daily    Benign essential hypertension       Magnesium Citrate 125 MG Caps           Multi-vitamin Tabs tablet      Take 1 tablet by mouth daily

## 2018-05-25 NOTE — PROGRESS NOTES
[facility] Initial Evaluation  Subjective:  Patient is a 63 year old male presenting with rehab left ankle/foot hpi.                                      Pertinent medical history includes:  Heart problems, high blood pressure and smoking.  Medical allergies: no.  Other surgeries include:  None reported.  Current medications:  High blood pressure medication.  Current occupation is sales.    Primary job tasks include:  Driving and repetitive tasks.                                Objective:  System    Physical Exam    General     ROS    Assessment/Plan:

## 2018-05-25 NOTE — PROGRESS NOTES
"[facility] Initial Evaluation  Subjective:  Patient is a 63 year old male presenting with rehab left shoulder hpi.   Kailash Murray is a 63 year old male with a left shoulder condition.  Condition occurred with:  Other.  Condition occurred: for unknown reasons.  This is a chronic condition  Patient reports onset of left shoulder pain since February 2018. Patient reports the pain has worsened in the last 3 weeks. Patient only reports the pain while in resting positions.  .    Patient reports pain:  Posterior.    Quality: \"pulsating\" and is constant and reported as 4/10.  Associated with: none. Pain is worse during the day.  Exacerbated by: resting after working for long periods of time. and relieved by nothing.  Since onset symptoms are gradually worsening.  Special tests:  X-ray (see epic. scheduled for an MRI on May 30th).      General health as reported by patient is good.                      Red flags:  None as reported by the patient.                        Objective:  System              Cervical/Thoracic Evaluation    AROM:  AROM Cervical:    Flexion:            WNL  Extension:       WNL  Rotation:         Left: 45 deg     Right: WNL  Side Bend:      Left: 30 deg     Right:  30 deg      Headaches: none  Cervical Myotomes:  normal                      Cervical Dermatomes:  normal                        Cervical Stability/Joint Clearing:  normal (negative spurlings )      Spinal Segmental Conclusions:    Level:  Hypo at C3, C2 and C1             Shoulder Evaluation:  ROM:  AROM:  normal                                  Strength:  normal                      Stability Testing:  normal      Special Tests:  normal      Palpation:    Left shoulder tenderness present at:  Triceps (medial head into the axilla)    Mobility Tests:  normal                                                 General     ROS    Assessment/Plan:    Patient is a 63 year old male with left side shoulder complaints.    Patient " has the following significant findings with corresponding treatment plan.                Diagnosis 1:  Left shoulder pain  Pain -  hot/cold therapy, manual therapy, self management, education and home program  Decreased ROM/flexibility - manual therapy, therapeutic exercise, therapeutic activity and home program  Decreased joint mobility - manual therapy, therapeutic exercise, therapeutic activity and home program  Decreased function - therapeutic activities and home program    Therapy Evaluation Codes:   1) History comprised of:   Personal factors that impact the plan of care:      None.    Comorbidity factors that impact the plan of care are:      None.     Medications impacting care: None.  2) Examination of Body Systems comprised of:   Body structures and functions that impact the plan of care:      Shoulder.   Activity limitations that impact the plan of care are:      Working.  3) Clinical presentation characteristics are:   Stable/Uncomplicated.  4) Decision-Making    Low complexity using standardized patient assessment instrument and/or measureable assessment of functional outcome.  Cumulative Therapy Evaluation is: Low complexity.    Previous and current functional limitations:  (See Goal Flow Sheet for this information)    Short term and Long term goals: (See Goal Flow Sheet for this information)     Communication ability:  Patient appears to be able to clearly communicate and understand verbal and written communication and follow directions correctly.  Treatment Explanation - The following has been discussed with the patient:   RX ordered/plan of care  Anticipated outcomes  Possible risks and side effects  This patient would benefit from PT intervention to resume normal activities.   Rehab potential is good.    Frequency:  1 X week, once daily  Duration:  for 4 weeks  Discharge Plan:  Achieve all LTG.  Independent in home treatment program.  Reach maximal therapeutic benefit.    Please refer to the daily  flowsheet for treatment today, total treatment time and time spent performing 1:1 timed codes.

## 2018-05-25 NOTE — NURSING NOTE
"Chief Complaint   Patient presents with     Physical     Health Maintenance     ALT, Lipid, BMP, Colonoscopy, CBC, ADP, HF Action Plan      Initial /68 (BP Location: Left arm, Patient Position: Chair, Cuff Size: Adult Regular)  Pulse 75  Temp 99.3  F (37.4  C) (Oral)  Resp 13  Ht 5' 8\" (1.727 m)  Wt 146 lb 8 oz (66.5 kg)  SpO2 96%  BMI 22.28 kg/m2 Estimated body mass index is 22.28 kg/(m^2) as calculated from the following:    Height as of this encounter: 5' 8\" (1.727 m).    Weight as of this encounter: 146 lb 8 oz (66.5 kg).  BP completed using cuff size: shy Talavera  "

## 2018-05-30 ENCOUNTER — RADIANT APPOINTMENT (OUTPATIENT)
Dept: MRI IMAGING | Facility: CLINIC | Age: 63
End: 2018-05-30
Attending: FAMILY MEDICINE
Payer: COMMERCIAL

## 2018-05-30 DIAGNOSIS — M79.602 PAIN OF LEFT UPPER EXTREMITY: ICD-10-CM

## 2018-05-30 PROCEDURE — 72141 MRI NECK SPINE W/O DYE: CPT | Mod: TC

## 2018-05-30 NOTE — PROGRESS NOTES
Kailash,    You do have neck arthritis which may be pinching a nerve, causing your left arm pain. If physical therapy is not helping, follow-up in clinic to discuss next steps.     Best,  Carolyne Cornejo MD
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
## 2018-06-01 ENCOUNTER — OFFICE VISIT (OUTPATIENT)
Dept: FAMILY MEDICINE | Facility: CLINIC | Age: 63
End: 2018-06-01
Payer: COMMERCIAL

## 2018-06-01 VITALS
HEART RATE: 99 BPM | RESPIRATION RATE: 12 BRPM | WEIGHT: 147.5 LBS | BODY MASS INDEX: 22.43 KG/M2 | DIASTOLIC BLOOD PRESSURE: 70 MMHG | TEMPERATURE: 97.5 F | OXYGEN SATURATION: 97 % | SYSTOLIC BLOOD PRESSURE: 130 MMHG

## 2018-06-01 DIAGNOSIS — M79.2 RADICULAR PAIN IN LEFT ARM: Primary | ICD-10-CM

## 2018-06-01 DIAGNOSIS — M50.20 DISPLACEMENT OF CERVICAL INTERVERTEBRAL DISC WITHOUT MYELOPATHY: ICD-10-CM

## 2018-06-01 PROCEDURE — 99214 OFFICE O/P EST MOD 30 MIN: CPT | Performed by: FAMILY MEDICINE

## 2018-06-01 ASSESSMENT — PAIN SCALES - GENERAL: PAINLEVEL: NO PAIN (0)

## 2018-06-01 NOTE — MR AVS SNAPSHOT
After Visit Summary   6/1/2018    Kailash Murray    MRN: [id number]           Patient Information     Date Of Birth          1955        Visit Information        Provider Department      6/1/2018 11:00 AM Jazzmine Vitale MD [facility]        Today's Diagnoses     Radicular pain in left arm    -  1    Displacement of cervical intervertebral disc without myelopathy          Care Instructions    [facility]    If you have any questions regarding to your visit please contact your care team:       Team Purple:   Clinic Hours Telephone Number   Dr. Jazzmine Rodriguez   7am-7pm  Monday - Thursday   7am-5pm  Fridays  [phone]  (Appointment scheduling available 24/7)    Questions about your recent visit?   Team Line:  [phone]   Urgent Care - [place] and [facility] - 11am-9pm Monday-Friday Saturday-Sunday- 9am-5pm   [place] - 5pm-9pm Monday-Friday Saturday-Sunday- 9am-5pm  [phone] - [place]  [phone] - [place]       What options do I have for a visit other than an office visit? We offer electronic visits (e-visits) and telephone visits, when medically appropriate.  Please check with your medical insurance to see if these types of visits are covered, as you will be responsible for any charges that are not paid by your insurance.      You can use Recoup (secure electronic communication) to access to your chart, send your primary care provider a message, or make an appointment. Ask a team member how to get started.     For a price quote for your services, please call our Consumer Price Line at [phone] or our Imaging Cost estimation line at [phone] (for imaging tests).              Follow-ups after your visit        Your next 10 appointments already scheduled     Jun 06, 2018  3:10 PM CDT   [facility] Extremity with Elliot Jimenez, PT   [facility]    [address]  [address]  [place] [address] [phone]            Jun 08, 2018   Procedure with William Charles Duane, MD   [facility] [place] (--)    [address] [place] [address] [phone]              Who to contact     If you have questions or need follow up information about today's clinic visit or your schedule please contact [facility] directly at [phone].  Normal or non-critical lab and imaging results will be communicated to you by Golfsmithhart, letter or phone within 4 business days after the clinic has received the results. If you do not hear from us within 7 days, please contact the clinic through [employer] or phone. If you have a critical or abnormal lab result, we will notify you by phone as soon as possible.  Submit refill requests through Experticity or call your pharmacy and they will forward the refill request to us. Please allow 3 business days for your refill to be completed.          Additional Information About Your Visit        Experticity Information     Experticity gives you secure access to your electronic health record. If you see a primary care provider, you can also send messages to your care team and make appointments. If you have questions, please call your primary care clinic.  If you do not have a primary care provider, please call [phone] and they will assist you.        Care EveryWhere ID     This is your Care EveryWhere ID. This could be used by other organizations to access your [facility] medical records  [id number]        Your Vitals Were     Pulse Temperature Respirations Pulse Oximetry BMI (Body Mass Index)       99 97.5  F (36.4  C) (Oral) 12 97% 22.43 kg/m2        Blood Pressure from Last 3 Encounters:   06/01/18 146/84   05/25/18 132/68   05/24/18 140/80    Weight from Last 3 Encounters:   06/01/18 147 lb 8 oz (66.9 kg)   05/25/18 146 lb 8 oz (66.5 kg)   05/24/18 148 lb 12.8 oz (67.5 kg)              Today, you had the following     No orders  found for display       Primary Care Provider Office Phone # Fax #    Jazzmine Vitale [phone] [address] [address]        Equal Access to Services     TYLER SHEETS : Arslan patricio coreas taylor Soюлия, waaxda luqadaha, qaybta kaalmada adekaushalda, maricruz bruce laTonigulshan ortiz. So [facility] [phone].    ATENCIÓN: Si habla español, tiene a doyle disposición servicios gratuitos de asistencia lingüística. Llame al [phone].    We comply with applicable federal civil rights laws and [place] laws. We do not discriminate on the basis of race, color, national origin, age, disability, sex, sexual orientation, or gender identity.            Thank you!     Thank you for choosing [facility]  for your care. Our goal is always to provide you with excellent care. Hearing back from our patients is one way we can continue to improve our services. Please take a few minutes to complete the written survey that you may receive in the mail after your visit with us. Thank you!             Your Updated Medication List - Protect others around you: Learn how to safely use, store and throw away your medicines at www.disposemymeds.org.          This list is accurate as of 6/1/18 11:36 AM.  Always use your most recent med list.                   Brand Name Dispense Instructions for use Diagnosis    amLODIPine 5 MG tablet    NORVASC    90 tablet    Take 1 tablet (5 mg) by mouth daily    Benign essential hypertension       aspirin 81 MG tablet     30 tablet    Take 1 tablet (81 mg) by mouth daily    Hypertension goal BP (blood pressure) < 140/90       BETA BLOCKER NOT PRESCRIBED (INTENTIONAL)      Beta Blocker not prescribed intentionally due to EF > 40 % (ejection fraction)    Chronic diastolic heart failure (H)       losartan 100 MG tablet    COZAAR    90 tablet    Take 1 tablet (100 mg) by mouth daily    Benign essential hypertension       Magnesium Citrate 125 MG  Caps           Multi-vitamin Tabs tablet      Take 1 tablet by mouth daily

## 2018-06-01 NOTE — PATIENT INSTRUCTIONS
[facility]    If you have any questions regarding to your visit please contact your care team:       Team Purple:   Clinic Hours Telephone Number   Dr. Jazzmine Rodriguez   7am-7pm  Monday - Thursday   7am-5pm  Fridays  [phone]  (Appointment scheduling available 24/7)    Questions about your recent visit?   Team Line:  [phone]   Urgent Care - [place] and [facility] - 11am-9pm Monday-Friday Saturday-Sunday- 9am-5pm   [place] - 5pm-9pm Monday-Friday Saturday-Sunday- 9am-5pm  [phone] - [place]  [phone] [facility]       What options do I have for a visit other than an office visit? We offer electronic visits (e-visits) and telephone visits, when medically appropriate.  Please check with your medical insurance to see if these types of visits are covered, as you will be responsible for any charges that are not paid by your insurance.      You can use Getting[employer] (secure electronic communication) to access to your chart, send your primary care provider a message, or make an appointment. Ask a team member how to get started.     For a price quote for your services, please call our Consumer Price Line at [phone] or our Imaging Cost estimation line at [phone] (for imaging tests).

## 2018-06-04 ENCOUNTER — TELEPHONE (OUTPATIENT)
Dept: FAMILY MEDICINE | Facility: CLINIC | Age: 63
End: 2018-06-04

## 2018-06-04 NOTE — TELEPHONE ENCOUNTER
Reason for Call:  Other FMLA form     Detailed comments: Patient saw Dr. Vitale on 05/29/18 and dropped off forms. Patient states his employer needs them by 106/10/18. Please call when done    Phone Number Patient can be reached at: Home number on file [phone] (home)    Best Time: ASAP    Can we leave a detailed message on this number? YES    Call taken on 6/4/2018 at 12:20 PM by Ngoc Best

## 2018-06-04 NOTE — TELEPHONE ENCOUNTER
Called patient let him know this was faxed 2 times now and this gets faxed to [employer] not to his work.  Corina Martinez,

## 2018-06-07 ENCOUNTER — TELEPHONE (OUTPATIENT)
Dept: FAMILY MEDICINE | Facility: CLINIC | Age: 63
End: 2018-06-07

## 2018-06-07 NOTE — TELEPHONE ENCOUNTER
Reason for Call:  Other disability claim    Detailed comments: Insurance company has questions regarding disability claim. They are also faxing a form. Please call    Phone Number Patient can be reached at: Other phone number: [phone]     Best Time: ASAP    Can we leave a detailed message on this number? YES    Call taken on 6/7/2018 at 11:02 AM by Ngoc Best

## 2018-06-08 ENCOUNTER — HOSPITAL ENCOUNTER (OUTPATIENT)
Facility: AMBULATORY SURGERY CENTER | Age: 63
Discharge: HOME | End: 2018-06-08
Attending: INTERNAL MEDICINE | Admitting: INTERNAL MEDICINE
Payer: COMMERCIAL

## 2018-06-08 ENCOUNTER — TELEPHONE (OUTPATIENT)
Dept: FAMILY MEDICINE | Facility: CLINIC | Age: 63
End: 2018-06-08

## 2018-06-08 ENCOUNTER — SURGERY (OUTPATIENT)
Age: 63
End: 2018-06-08

## 2018-06-08 VITALS
OXYGEN SATURATION: 98 % | SYSTOLIC BLOOD PRESSURE: 138 MMHG | HEART RATE: 66 BPM | RESPIRATION RATE: 16 BRPM | TEMPERATURE: 97.8 F | DIASTOLIC BLOOD PRESSURE: 80 MMHG

## 2018-06-08 LAB — COLONOSCOPY: NORMAL

## 2018-06-08 PROCEDURE — 88305 TISSUE EXAM BY PATHOLOGIST: CPT | Performed by: INTERNAL MEDICINE

## 2018-06-08 PROCEDURE — G8918 PT W/O PREOP ORDER IV AB PRO: HCPCS

## 2018-06-08 PROCEDURE — G8907 PT DOC NO EVENTS ON DISCHARG: HCPCS

## 2018-06-08 PROCEDURE — 45385 COLONOSCOPY W/LESION REMOVAL: CPT

## 2018-06-08 RX ORDER — FENTANYL CITRATE 50 UG/ML
INJECTION, SOLUTION INTRAMUSCULAR; INTRAVENOUS PRN
Status: DISCONTINUED | OUTPATIENT
Start: 2018-06-08 | End: 2018-06-08 | Stop reason: HOSPADM

## 2018-06-08 RX ORDER — LIDOCAINE 40 MG/G
CREAM TOPICAL
Status: DISCONTINUED | OUTPATIENT
Start: 2018-06-08 | End: 2018-06-09 | Stop reason: HOSPADM

## 2018-06-08 RX ORDER — ONDANSETRON 2 MG/ML
4 INJECTION INTRAMUSCULAR; INTRAVENOUS
Status: DISCONTINUED | OUTPATIENT
Start: 2018-06-08 | End: 2018-06-09 | Stop reason: HOSPADM

## 2018-06-08 RX ADMIN — FENTANYL CITRATE 50 MCG: 50 INJECTION, SOLUTION INTRAMUSCULAR; INTRAVENOUS at 08:41

## 2018-06-08 RX ADMIN — FENTANYL CITRATE 50 MCG: 50 INJECTION, SOLUTION INTRAMUSCULAR; INTRAVENOUS at 08:42

## 2018-06-08 NOTE — TELEPHONE ENCOUNTER
Reason for Call:  Form, our goal is to have forms completed with 72 hours, however, some forms may require a visit or additional information.    Type of letter, form or note:  disability - checking to see if it was received and when they will get it back.    Who is the form from?: Insurance comp    Where did the form come from: form was faxed in    What clinic location was the form placed at?: [place] [facility]  - form was faxed to [phone]  Where the form was placed: NA    What number is listed as a contact on the form?: [phone] Judi EXT [id number]       Additional comments: Deadline is today for completion of form.    Call taken on 6/8/2018 at 10:53 AM by Iza Muñoz

## 2018-06-08 NOTE — TELEPHONE ENCOUNTER
I called and spoke with Jaswinder Gutierrez RN ([place]).     Informed her that we received the form asking for medical records and also 5 questions to be answered.    Jaswinder stated that they have received copies of the records, but need answers to the questions.  I informed her that Dr. Vitale is out of the office until June 13th.  Form given to Dr. Vitale to complete when she returns on June 13th. Marge Milton,

## 2018-06-12 LAB — COPATH REPORT: NORMAL

## 2018-06-26 ENCOUNTER — OFFICE VISIT (OUTPATIENT)
Dept: FAMILY MEDICINE | Facility: CLINIC | Age: 63
End: 2018-06-26
Payer: COMMERCIAL

## 2018-06-26 VITALS
HEIGHT: 68 IN | DIASTOLIC BLOOD PRESSURE: 50 MMHG | SYSTOLIC BLOOD PRESSURE: 120 MMHG | OXYGEN SATURATION: 95 % | BODY MASS INDEX: 23.34 KG/M2 | HEART RATE: 71 BPM | WEIGHT: 154 LBS | TEMPERATURE: 97.2 F

## 2018-06-26 DIAGNOSIS — I10 BENIGN ESSENTIAL HYPERTENSION: ICD-10-CM

## 2018-06-26 DIAGNOSIS — J44.9 CHRONIC OBSTRUCTIVE PULMONARY DISEASE, UNSPECIFIED COPD TYPE (H): ICD-10-CM

## 2018-06-26 DIAGNOSIS — F17.200 SMOKER: ICD-10-CM

## 2018-06-26 DIAGNOSIS — I50.32 CHRONIC DIASTOLIC HEART FAILURE (H): ICD-10-CM

## 2018-06-26 DIAGNOSIS — M51.379 DDD (DEGENERATIVE DISC DISEASE), LUMBOSACRAL: Primary | ICD-10-CM

## 2018-06-26 PROCEDURE — 99214 OFFICE O/P EST MOD 30 MIN: CPT | Performed by: FAMILY MEDICINE

## 2018-06-26 ASSESSMENT — ANXIETY QUESTIONNAIRES
GAD7 TOTAL SCORE: 0
3. WORRYING TOO MUCH ABOUT DIFFERENT THINGS: NOT AT ALL
IF YOU CHECKED OFF ANY PROBLEMS ON THIS QUESTIONNAIRE, HOW DIFFICULT HAVE THESE PROBLEMS MADE IT FOR YOU TO DO YOUR WORK, TAKE CARE OF THINGS AT HOME, OR GET ALONG WITH OTHER PEOPLE: NOT DIFFICULT AT ALL
5. BEING SO RESTLESS THAT IT IS HARD TO SIT STILL: NOT AT ALL
6. BECOMING EASILY ANNOYED OR IRRITABLE: NOT AT ALL
7. FEELING AFRAID AS IF SOMETHING AWFUL MIGHT HAPPEN: NOT AT ALL
1. FEELING NERVOUS, ANXIOUS, OR ON EDGE: NOT AT ALL
2. NOT BEING ABLE TO STOP OR CONTROL WORRYING: NOT AT ALL

## 2018-06-26 ASSESSMENT — PATIENT HEALTH QUESTIONNAIRE - PHQ9: 5. POOR APPETITE OR OVEREATING: NOT AT ALL

## 2018-06-26 NOTE — PROGRESS NOTES
"  SUBJECTIVE:   Kailash Murray is a 63 year old male who presents to clinic today for the following health issues:    Hypertension Follow-up      Outpatient blood pressures are not being checked.    Low Salt Diet: low salt    COPD Follow-Up    Symptoms are currently: stable    Current fatigue or dyspnea with ambulation: none    Shortness of breath: With lying down    Increased or change in Cough/Sputum: No    Fever(s): No    Any ER/UC or hospital admissions since your last visit? No     History   Smoking Status     Current Every Day Smoker     Packs/day: 1.00     Years: 40.00     Types: Cigarettes     Start date: 3/20/1972   Smokeless Tobacco     Never Used     No results found for: FEV1, HWW5MOD    Amount of exercise or physical activity: active    Problems taking medications regularly: No    Medication side effects: none    Diet: low salt and low fat/cholesterol    He has bilateral calf pain during the day, aching, relieved by rest, for months. He is not interested in smoking cessation.     I have reviewed the patient's medical history in detail and updated the computerized patient record.     ROS:  CONSTITUTIONAL: NEGATIVE for fever, chills, change in weight  RESP: NEGATIVE for significant cough or SOB  CV: NEGATIVE for chest pain, palpitations or peripheral edema  MUSCULOSKELETAL: as above   NEURO: NEGATIVE for weakness, dizziness or paresthesias    OBJECTIVE:     /50  Pulse 71  Temp 97.2  F (36.2  C) (Oral)  Ht 5' 8\" (1.727 m)  Wt 154 lb (69.9 kg)  SpO2 95%  BMI 23.42 kg/m2  Body mass index is 23.42 kg/(m^2).  GENERAL: healthy, alert and no distress  MS: no gross musculoskeletal defects noted, no edema  PSYCH: mentation appears normal, affect normal/bright    Diagnostic Test Results:  Results for orders placed or performed during the hospital encounter of 06/08/18   COLONOSCOPY   Result Value Ref Range    COLONOSCOPY       [facility]  Endoscopy Department-[place] " [place]  _______________________________________________________________________________  Patient Name: Kailash Murray             Procedure Date: 6/8/2018 8:35 AM  MRN: [id number]                       YOB: 1955  Admit Type: Outpatient                Age: 63  Gender: Male                          Note Status: Addendum  Attending MD: William C. Duane ,       Instrument Name: [id number]  _______________________________________________________________________________     Procedure:                Colonoscopy  Indications:              High risk colon cancer surveillance: Personal                             history of colonic polyps  Providers:                William C. Duane, Debra Kjeldahl, JENNY  Referring MD:             Jazzmine Vitale MD  Medicines:                Fentanyl 100 micrograms IV, Midazolam 4 mg IV  Complications:            No immediate complications.  ____________________________________ ___________________________________________  Procedure:                Pre-Anesthesia Assessment:                            - Prior to the procedure, a History and Physical                             was performed, and patient medications and                             allergies were reviewed. The risks and benefits of                             the procedure and the sedation options and risks                             were discussed with the patient. All questions were                             answered and informed consent was obtained. Patient                             identification and proposed procedure were verified                             by the physician and the nurse in the pre-procedure                             area. Mental Status Examination: alert and                             oriented. Airway Examination: normal oropharyngeal                             airway and neck mobility. Respiratory Examination:                             clear to auscultation.  CV  Examination: normal.                             Prophylactic Antibiotics: The patient does not                             require prophylactic antibiotics. Prior                             Anticoagulants: The patient has taken no previous                             anticoagulant or antiplatelet agents. ASA Grade                             Assessment: III - A patient with severe systemic                             disease. After reviewing the risks and benefits,                             the patient was deemed in satisfactory condition to                             undergo the procedure. The anesthesia plan was to                             use moderate sedation / analgesia (conscious                             sedation). This assessment was completed before the                             administration of sedation at 08:35 AM.                            After obtaining informed consent, the colonoscope                             was passed under direct vision. Througho ut the                             procedure, the patient's blood pressure, pulse, and                             oxygen saturations were monitored continuously. The                             Colonoscope was introduced through the anus and                             advanced to the cecum, identified by appendiceal                             orifice and ileocecal valve. The colonoscopy was                             performed with difficulty due to a tortuous sigmoid                             colon that required switching to peds colonoscope.                             The patient tolerated the procedure well. The                             quality of the bowel preparation was excellent.                                                                                   Findings:       A 4 mm polyp was found in the sigmoid colon. The polyp was sessile. The        polyp was removed with a cold snare. Resection and retrieval were         complete.       A 4 mm polyp was found in th e sigmoid colon. The polyp was sessile. The        polyp was removed with a cold snare. Resection and retrieval were        complete.       A 5 mm polyp was found in the rectum. The polyp was semi-sessile. The        polyp was removed with a cold snare. Resection and retrieval were        complete.       Many diverticula were found in the sigmoid colon.                                                                                   Moderate Sedation:       Moderate (conscious) sedation was administered by the endoscopy nurse        and supervised by the endoscopist. The following parameters were        monitored: oxygen saturation, heart rate, blood pressure, and response        to care. Total physician intraservice time was 54 minutes.  Impression:               - One 4 mm polyp in the sigmoid colon, removed with                             a cold snare. Resected and retrieved.                            - One 4 mm polyp in the sigmoid colon, removed with                              a cold snare. Resected and retrieved.                            - One 5 mm polyp in the rectum, removed with a cold                             snare. Resected and retrieved.                            - Diverticulosis in the sigmoid colon.  Recommendation:           - Await pathology results.                            - If the pathology shows adenoma in three or more                             polyps, repeat colonoscopy in 3 years. If one or                             two are adenomas, repeat in 5 years. If none are                             adenomas, repeat in 10 years.                            - High fiber diet.                                                                                     __________________  William C. Duane,   6/8/2018 9:42:59 AM  I was physically present for the entire viewing portion of the exam.William Duane  Number of Addenda: 1    Note Initiated  "On: 6/8/2018 8:35 AM  Scope In:  Scope Out:  ______________________________________________________ _________________________    Addendum Number: 1   Addendum Date: 6/8/2018 9:49:32 AM           The recommendation should be modified to say that if all three polyps        are adenomas, repeat in 3 years. Otherwise repeat in 5 years even if all        three are hyperplastic.        __________________  William C. Duane,   6/8/2018 9:50:28 AM  I was physically present for the entire viewing portion of the exam.William Duane     Surgical pathology exam   Result Value Ref Range    Copath Report       Patient Name: HARI MCDONALD  MR#: [id number]  Specimen #: [id number]  Collected: 6/8/2018  Received: 6/11/2018  Reported: 6/12/2018 14:20  Ordering Phy(s): WILLIAM CHARLES DUANE    For improved result formatting, select 'View Enhanced Report Format' under   Linked Documents section.    SPECIMEN(S):  A: Sigmoid colon polyp  B: Sigmoid colon polyp  C: Rectal polyp    FINAL DIAGNOSIS:  A. SIGMOID COLON POLYP, POLYPECTOMY:  - Hyperplastic polyp    B. SIGMOID COLON POLYP, POLYPECTOMY:  - Tubular adenoma    C. RECTAL POLYP, POLYPECTOMY:  - Tubular adenoma    I have personally reviewed all specimens and/or slides, including the   listed special stains, and used them  with my medical judgement to determine or confirm the final diagnosis.    Electronically signed out by:    Shira Holder M.D., [facility]    CLINICAL HISTORY:   Colorectal cancer screening .  GROSS:  A: The specimen is received in formalin with proper patient   identification, labeled \"sigmoid polyp\".  The  specimen consists of o ne tan soft tissue measuring 0.7 cm in greatest   dimension.  The specimen is trisected  The specimen is entirely submitted in cassette A1.    B: The specimen is received in formalin with proper patient   identification, labeled \"sigmoid 3 mm polyp\".  The  specimen consists of one tan soft tissue measuring 0.4 cm in greatest   dimension.  The " "specimen is entirely  submitted in cassette B1.    C: The specimen is received in formalin with proper patient   identification, labeled \"rectal polyp\".  The  specimen consists of one tan soft tissue measuring 0.5 cm in greatest   dimension.  The specimen is entirely  submitted in cassette C1. (Dictated by: Ava Almanza 6/11/2018 09:21   AM)    MICROSCOPIC:  Microscopic examination is performed.    CPT Codes:  A: 01145-RJ3  B: 31247-QY7  C: 13480-EH7    TESTING LAB LOCATION:  [facility], [address]   [address]  [phone]    COLLECTION SITE:  Client: [facility]  Location: [facility] (B)           ASSESSMENT/PLAN:   (I73.9) Claudication (H)  (primary encounter diagnosis)  Plan: US MORGAN Doppler No Exercise        If normal, plan lumbar MRI     (M51.37) DDD (degenerative disc disease), lumbosacral  Plan: see above; consider physical therapy      (J44.9) Chronic obstructive pulmonary disease, unspecified COPD type (H)  (F17.200) Smoker  Comment: mild   Plan: offered albuterol HFA and urged smoking cessation; discussed strategies when ready     (I50.32) Chronic diastolic heart failure (H)  Comment: euvolemic   Plan: on ARB; discussed etiology, course of disease; see HF action plan     (I10) Benign essential hypertension  Comment: Well controlled with medications without side effects.       See Patient Instructions    Carolyne Cornejo MD  [facility]    "

## 2018-06-26 NOTE — LETTER
My COPD Action Plan     Name: Kailash Murray    YOB: 1955   Date: 6/26/2018    My doctor: Carolyne Cornejo MD   My clinic: [address]  [place] [id number] [phone]  My Controller Medicine: None   Dose:       My Rescue Medicine: Albuterol (Proair/Ventolin/Proventil) inhaler   Dose:       My Flare Up Medicine: Prednisone   Dose:       My COPD Severity: Mild = FeV1 > 80%      Use of Oxygen: Oxygen Not Prescribed      Make sure you've had your pneumonia   vaccines.          GREEN ZONE       Doing well today      Usual level of activity and exercise    Usual amount of cough and mucus    No shortness of breath    Usual level of health (thinking clearly, sleeping well, feel like eating) Actions:      Take daily medicines    Use oxygen as prescribed    Follow regular exercise and diet plan    Avoid cigarette smoke and other irritants that harm the lungs           YELLOW ZONE          Having a bad day or flare up      Short of breath more than usual    A lot more sputum (mucus) than usual    Sputum looks yellow, green, tan, brown or bloody    More coughing or wheezing    Fever or chills    Less energy; trouble completing activities    Trouble thinking or focusing    Using quick relief inhaler or nebulizer more often    Poor sleep; symptoms wake me up    Do not feel like eating Actions:      Get plenty of rest    Take daily medicines    Use quick relief inhaler every 4 hours    If you use oxygen, call you doctor to see if you should adjust your oxygen    Do breathing exercises or other things to help you relax    Let a loved one, friend or neighbor know you are feeling worse    Call your care team if you have 2 or more symptoms.  Start taking steroids or antibiotics if directed by your care team           RED ZONE       Need medical care now      Severe shortness of breath (feel you can't breathe)    Fever, chills    Not enough breath to do any activity    Trouble  coughing up mucus, walking or talking    Blood in mucus    Frequent coughing   Rescue medicines are not working    Not able to sleep because of breathing    Feel confused or drowsy    Chest pain    Actions:      Call your health care team.  If you cannot reach your care team, call 911 or go to the emergency room.        Annual Reminders:  Meet with Care Team, Flu Shot every Fall  Pharmacy:    [facility] - [place], [place] - [address]  [facility] - [address]  [facility] - [address]  [facility] - [place], [place]  [facility] [place] - [address]

## 2018-06-26 NOTE — MR AVS SNAPSHOT
After Visit Summary   6/26/2018    Kailash Murray    MRN: [id number]           Patient Information     Date Of Birth          1955        Visit Information        Provider Department      6/26/2018 1:40 PM Carolyne Cornejo MD [facility]        Today's Diagnoses     Claudication (H)    -  1    DDD (degenerative disc disease), lumbosacral        Chronic obstructive pulmonary disease, unspecified COPD type (H)        Smoker        Chronic diastolic heart failure (H)        Benign essential hypertension          Care Instructions    Call the imaging center at [phone] or  [phone] to schedule your leg artery ultrasound.    [facility]    If you have any questions regarding to your visit please contact your care team:       Team Red:   Clinic Hours Telephone Number   Dr. Carolyne Lynne, NP   7am-7pm  Monday - Thursday   7am-5pm  Fridays  [phone]  (Appointment scheduling available 24/7)    Questions about your recent visit?   Team Line  [phone]   [place] - [place] - 11am-9pm Monday-Friday Saturday-Sunday- 9am-5pm   [place] - 5pm-9pm Monday-Friday Saturday-Sunday- 9am-5pm  [phone] - Bettina Mathur  [phone] - [place]       What options do I have for a visit other than an office visit? We offer electronic visits (e-visits) and telephone visits, when medically appropriate.  Please check with your medical insurance to see if these types of visits are covered, as you will be responsible for any charges that are not paid by your insurance.      You can use zoidu (secure electronic communication) to access to your chart, send your primary care provider a message, or make an appointment. Ask a team member how to get started.     For a price quote for your services, please call our Consumer Price Line at [phone] or our Imaging Cost estimation line at [phone]  (for imaging tests).   What is COPD?  COPD stands for chronic obstructive pulmonary disease. It means the airways in your lungs are blocked (obstructed). Because of this, it is hard to breathe. You may have trouble with daily activities because of shortness of breath. Over time the shortness of breath usually worsens making it more and more difficult to take care of yourself and take part in activities. Chronic bronchitis and emphysema are two common types of COPD.  What happens in chronic bronchitis?    The cells in the airways make more mucus than normal. The mucus builds up, narrowing the airways. This means less air travels into and out of the lungs. The lining of the airways may also become inflamed (swollen) and causes the airways to narrow even more.        What happens in emphysema?    The small airways are damaged and lose their stretchiness. The airways collapse when you exhale, causing air to get trapped in the air sacs. This means that less oxygen enters the blood vessels and less oxygen is delivered to all of the cells of your body. This makes it hard to breathe.     Damage to cilia    Cilia are small hairs that line and protect the airways. Smoking damages the cilia. Damaged cilia can t sweep mucus and particles away. Some of the cilia are destroyed. This damage worsens COPD.  How did I get COPD?  Most people get COPD from smoking. Cigarette smoke damages lungs, which can develop into COPD over many years.  How COPD affects you  COPD makes you work harder to breathe. Air may get trapped in the lungs, which prevents your lungs from filling completely with fresh oxygen-filled air when you inhale (breathe in). It's harder to take deep breaths especially when you are active and start breathing faster. Over time, your lungs may become enlarged filling the lung with air that does not transfer oxygen into the blood. These problems cause you to have shortness of breath (also called dyspnea). Wheezing (hoarse,  whistling breathing), chronic cough, and fatigue (feeling tired and worn out) are also common.   Date Last Reviewed: 5/1/2016 2000-2017 [employer]. [address] [place] [address], [place] [address]. All rights reserved. This information is not intended as a substitute for professional medical care. Always follow your healthcare professional's instructions.        What is Heart Failure?  The heart is a muscle that pumps oxygen-rich blood to all parts of the body. When you have heart failure, the heart is not able to pump as well as it should. Blood and fluid may back up into the lungs, and some parts of the body don t get enough oxygen-rich blood to work normally. These problems lead to the symptoms you feel.  When you have heart failure  With heart failure, not enough oxygen-rich blood leaves the heart with each beat. There are 2 types of heart failure. Both affect the ventricles  ability to pump blood. You may have 1 or both types.       Systolic heart failure. The heart muscle becomes weak and enlarged. It can t pump enough oxygen-rich blood forward to the rest of the body when the ventricles contract. The measurement of how much blood your heart pushes out when it beats is called ejection fraction. In systolic heart failure, the ejection fraction is lower than normal. This can cause blood to back up into the lungs and cause shortness of breath and eventually ankle swelling (edema).  This is also called heart failure with reduced ejection fraction, or  HFrEF.    Diastolic heart failure. The heart muscle becomes stiff. It doesn t relax normally between contractions, which keeps the ventricles from filling with blood. Ejection fraction is often in the normal range. This can still lead to the backup of blood into the body and affect the organs such as the liver. This is also called heart failure with preserved ejection fraction or HFpEF.     Recognizing heart failure symptoms  When you have heart failure,  you need to pay close attention to your body and how you feel, every single day. That way, if a problem occurs, you can get help before it becomes too severe. You'll need to watch for changes in your symptoms. As long as symptoms stay about the same from one day to the next, your heart failure is stable. But if symptoms start to get worse, it's time to take action.  Signs and symptoms of worsening heart failure include:    Rapid weight gain    Shortness of breath    Swelling (edema)    Fatigue  How heart failure affects your body  When the heart doesn't pump enough blood, hormones (body chemicals) are sent to increase the amount of work the heart does. Some hormones make the heart grow larger. Others tell the heart to pump faster. As a result, the heart may pump more blood at first, but it can't keep up with the ongoing demands. So, the heart muscle becomes even more weak. Over time, even less blood is pumped through the heart. This leads to problems throughout the body as organs began to feel the effects of a long-term lack of oxygen. Eventually, if untreated, this can cause problems with your lungs, liver, kidneys, and loss of elasticity in the skin, and skin changes in the lower legs. A weak heart itself can eventually cause a severe decline in health and possible death if left untreated.  What is ejection fraction?  Ejection fraction (EF) measures how much blood the heart pumps out (ejects). This is measured to help diagnose heart failure. A healthy heart pumps at least half of the blood from the ventricles with each beat. This means a normal ejection fraction is around 50% to 70%. Your doctor will calculate ejection fraction from an echocardiogram.     My ejection fraction     Date: ______________________  Ejection fraction: _____________  Test used: __________________  Date Last Reviewed: 3/15/2016    9737-0341 [employer]. [address] [place] [address], [place] [address]. All rights reserved. This  information is not intended as a substitute for professional medical care. Always follow your healthcare professional's instructions.                Follow-ups after your visit        Follow-up notes from your care team     Return in about 1 year (around 6/26/2019), or if symptoms worsen or fail to improve, for physical (fasting labs up to one week prior).      Future tests that were ordered for you today     Open Future Orders        Priority Expected Expires Ordered    US MORGAN Doppler No Exercise Routine  6/26/2019 6/26/2018            Who to contact     If you have questions or need follow up information about today's clinic visit or your schedule please contact [facility] directly at [phone].  Normal or non-critical lab and imaging results will be communicated to you by Kliquehart, letter or phone within 4 business days after the clinic has received the results. If you do not hear from us within 7 days, please contact the clinic through [employer] or phone. If you have a critical or abnormal lab result, we will notify you by phone as soon as possible.  Submit refill requests through [employer] or call your pharmacy and they will forward the refill request to us. Please allow 3 business days for your refill to be completed.          Additional Information About Your Visit        MyChart Information     [employer] gives you secure access to your electronic health record. If you see a primary care provider, you can also send messages to your care team and make appointments. If you have questions, please call your primary care clinic.  If you do not have a primary care provider, please call [phone] and they will assist you.        Care EveryWhere ID     This is your Care EveryWhere ID. This could be used by other organizations to access your [facility] medical records  [id number]        Your Vitals Were     Pulse Temperature Height Pulse Oximetry BMI (Body Mass Index)       71 97.2  F (36.2  C) (Oral) 5'  "8\" (1.727 m) 95% 23.42 kg/m2        Blood Pressure from Last 3 Encounters:   06/26/18 120/50   06/08/18 138/80   06/01/18 130/70    Weight from Last 3 Encounters:   06/26/18 154 lb (69.9 kg)   06/01/18 147 lb 8 oz (66.9 kg)   05/25/18 146 lb 8 oz (66.5 kg)               Primary Care Provider Office Phone # Fax #    Jazzmine Vitale [phone] [address] [facility] [phone]        Equal Access to Services     [facility] SUDEEP : Hadii patricio Calabrese, waaxda luacaciaadaha, qaybta kaalmada yelena, maricruz ortiz. So [facility] [phone].    ATENCIÓN: Si habla español, tiene a doyle disposición servicios gratuitos de asistencia lingüística. [facility] [phone].    We comply with applicable federal civil rights laws and [place] laws. We do not discriminate on the basis of race, color, national origin, age, disability, sex, sexual orientation, or gender identity.            Thank you!     Thank you for choosing [facility]  for your care. Our goal is always to provide you with excellent care. Hearing back from our patients is one way we can continue to improve our services. Please take a few minutes to complete the written survey that you may receive in the mail after your visit with us. Thank you!             Your Updated Medication List - Protect others around you: Learn how to safely use, store and throw away your medicines at www.disposemymeds.org.          This list is accurate as of 6/26/18  2:36 PM.  Always use your most recent med list.                   Brand Name Dispense Instructions for use Diagnosis    amLODIPine 5 MG tablet    NORVASC    90 tablet    Take 1 tablet (5 mg) by mouth daily    Benign essential hypertension       aspirin 81 MG tablet     30 tablet    Take 1 tablet (81 mg) by mouth daily    Hypertension goal BP (blood pressure) < 140/90       BETA BLOCKER NOT PRESCRIBED (INTENTIONAL)      Beta Blocker not " prescribed intentionally due to EF > 40 % (ejection fraction)    Chronic diastolic heart failure (H)       losartan 100 MG tablet    COZAAR    90 tablet    Take 1 tablet (100 mg) by mouth daily    Benign essential hypertension       Magnesium Citrate 125 MG Caps           Multi-vitamin Tabs tablet      Take 1 tablet by mouth daily

## 2018-06-26 NOTE — PATIENT INSTRUCTIONS
Call the imaging center at [phone] or  [phone] to schedule your leg artery ultrasound.    [facility]    If you have any questions regarding to your visit please contact your care team:       Team Red:   Clinic Hours Telephone Number   Dr. Carolyne Lynne, NP   7am-7pm  Monday - Thursday   7am-5pm  Fridays  [phone]  (Appointment scheduling available 24/7)    Questions about your recent visit?   Team Line  [phone]   Urgent Care - [place] and [facility] - 11am-9pm Monday-Friday Saturday-Sunday- 9am-5pm   [place] - 5pm-9pm Monday-Friday Saturday-Sunday- 9am-5pm  [phone] - [place]  [phone] - [place]       What options do I have for a visit other than an office visit? We offer electronic visits (e-visits) and telephone visits, when medically appropriate.  Please check with your medical insurance to see if these types of visits are covered, as you will be responsible for any charges that are not paid by your insurance.      You can use [employer]pl[employer] (secure electronic communication) to access to your chart, send your primary care provider a message, or make an appointment. Ask a team member how to get started.     For a price quote for your services, please call our Consumer Price Line at [phone] or our Imaging Cost estimation line at [phone] (for imaging tests).   What is COPD?  COPD stands for chronic obstructive pulmonary disease. It means the airways in your lungs are blocked (obstructed). Because of this, it is hard to breathe. You may have trouble with daily activities because of shortness of breath. Over time the shortness of breath usually worsens making it more and more difficult to take care of yourself and take part in activities. Chronic bronchitis and emphysema are two common types of COPD.  What happens in chronic bronchitis?    The cells in the airways make more mucus than normal. The  mucus builds up, narrowing the airways. This means less air travels into and out of the lungs. The lining of the airways may also become inflamed (swollen) and causes the airways to narrow even more.        What happens in emphysema?    The small airways are damaged and lose their stretchiness. The airways collapse when you exhale, causing air to get trapped in the air sacs. This means that less oxygen enters the blood vessels and less oxygen is delivered to all of the cells of your body. This makes it hard to breathe.     Damage to cilia    Cilia are small hairs that line and protect the airways. Smoking damages the cilia. Damaged cilia can t sweep mucus and particles away. Some of the cilia are destroyed. This damage worsens COPD.  How did I get COPD?  Most people get COPD from smoking. Cigarette smoke damages lungs, which can develop into COPD over many years.  How COPD affects you  COPD makes you work harder to breathe. Air may get trapped in the lungs, which prevents your lungs from filling completely with fresh oxygen-filled air when you inhale (breathe in). It's harder to take deep breaths especially when you are active and start breathing faster. Over time, your lungs may become enlarged filling the lung with air that does not transfer oxygen into the blood. These problems cause you to have shortness of breath (also called dyspnea). Wheezing (hoarse, whistling breathing), chronic cough, and fatigue (feeling tired and worn out) are also common.   Date Last Reviewed: 5/1/2016 2000-2017 [employer]. [address] [place] [address]. All rights reserved. This information is not intended as a substitute for professional medical care. Always follow your healthcare professional's instructions.        What is Heart Failure?  The heart is a muscle that pumps oxygen-rich blood to all parts of the body. When you have heart failure, the heart is not able to pump as well as it should. Blood and  fluid may back up into the lungs, and some parts of the body don t get enough oxygen-rich blood to work normally. These problems lead to the symptoms you feel.  When you have heart failure  With heart failure, not enough oxygen-rich blood leaves the heart with each beat. There are 2 types of heart failure. Both affect the ventricles  ability to pump blood. You may have 1 or both types.       Systolic heart failure. The heart muscle becomes weak and enlarged. It can t pump enough oxygen-rich blood forward to the rest of the body when the ventricles contract. The measurement of how much blood your heart pushes out when it beats is called ejection fraction. In systolic heart failure, the ejection fraction is lower than normal. This can cause blood to back up into the lungs and cause shortness of breath and eventually ankle swelling (edema).  This is also called heart failure with reduced ejection fraction, or  HFrEF.    Diastolic heart failure. The heart muscle becomes stiff. It doesn t relax normally between contractions, which keeps the ventricles from filling with blood. Ejection fraction is often in the normal range. This can still lead to the backup of blood into the body and affect the organs such as the liver. This is also called heart failure with preserved ejection fraction or HFpEF.     Recognizing heart failure symptoms  When you have heart failure, you need to pay close attention to your body and how you feel, every single day. That way, if a problem occurs, you can get help before it becomes too severe. You'll need to watch for changes in your symptoms. As long as symptoms stay about the same from one day to the next, your heart failure is stable. But if symptoms start to get worse, it's time to take action.  Signs and symptoms of worsening heart failure include:    Rapid weight gain    Shortness of breath    Swelling (edema)    Fatigue  How heart failure affects your body  When the heart doesn't pump  enough blood, hormones (body chemicals) are sent to increase the amount of work the heart does. Some hormones make the heart grow larger. Others tell the heart to pump faster. As a result, the heart may pump more blood at first, but it can't keep up with the ongoing demands. So, the heart muscle becomes even more weak. Over time, even less blood is pumped through the heart. This leads to problems throughout the body as organs began to feel the effects of a long-term lack of oxygen. Eventually, if untreated, this can cause problems with your lungs, liver, kidneys, and loss of elasticity in the skin, and skin changes in the lower legs. A weak heart itself can eventually cause a severe decline in health and possible death if left untreated.  What is ejection fraction?  Ejection fraction (EF) measures how much blood the heart pumps out (ejects). This is measured to help diagnose heart failure. A healthy heart pumps at least half of the blood from the ventricles with each beat. This means a normal ejection fraction is around 50% to 70%. Your doctor will calculate ejection fraction from an echocardiogram.     My ejection fraction     Date: ______________________  Ejection fraction: _____________  Test used: __________________  Date Last Reviewed: 3/15/2016    2630-7306 [employer]. [address] [place] [address], [place] [address]. All rights reserved. This information is not intended as a substitute for professional medical care. Always follow your healthcare professional's instructions.

## 2018-06-26 NOTE — LETTER
My Heart Failure Action Plan   Name: Kailash Murray    YOB: 1955   Date: 6/26/2018    My doctor: Jazzmine Vitale     [address]  [place] [id number] [phone]  My Diagnosis: Diastolic Heart Failure   My Ejection Fraction: Over 50%    My Exercise Goal: 30 minutes daily  .     My Weight Goal:    Wt Readings from Last 2 Encounters:   06/26/18 154 lb (69.9 kg)   06/01/18 147 lb 8 oz (66.9 kg)     Weigh yourself daily using the same scale. If you gain more than 2 pounds in 24 hours or 5 pounds in a week call the clinic    My Diet Goal: No added salt    Emergency Room Visits:    Our goal is to improve your quality of life and help you avoid a visit to the emergency room or hospital.  If we work together, we can achieve this goal. But, if you feel you need to call 911 or go to the emergency room, please do so.  If you go to the emergency room, please bring your list of medicines and your daily weight chart with you.       GREEN ZONE     Doing well today    Weight gained is no more than 2 pounds a day or 5 pounds a week.    No swelling in feet, ankles, legs or stomach.    No more swelling than usual.    No more trouble breathing than usual.    No change in my sleep.    No other problems. Actions:    I am doing fine.  I will take my medicine, follow my diet, see my doctor, exercise, and watch for symptoms.           YELLOW ZONE         Having a bad day or flare up    Weight gain of more than 2 pounds in one day or 5 pounds in one week.    New swelling in ankle, leg, knee or thigh.    Bloating in belly, pants feel tighter.    Swelling in hands or face.    Coughing or trouble breathing while walking or talking.    Harder to breathe last night.    Have trouble sleeping, wake up short of breath.    Much more tired than usual.    Not eating.    Pain in my chest or bad leg cramps.    Feel weak or dizzy. Actions:    I need to take action and call my doctor or nurse  today.                 RED ZONE         Need medical care now    Weight gain of 5 pounds overnight.    Chest pain or pressure that does not go away.    Feel less alert.    Wheezing or have trouble breathing when at rest.    Cannot sleep lying down.    Cannot take my water pill.    Pass out or faint. Actions:    I need to call my doctor or nurse now!    Call 911 if I have chest pain or cannot breathe.

## 2018-06-27 ASSESSMENT — ANXIETY QUESTIONNAIRES: GAD7 TOTAL SCORE: 0

## 2018-07-06 ENCOUNTER — RADIANT APPOINTMENT (OUTPATIENT)
Dept: ULTRASOUND IMAGING | Facility: CLINIC | Age: 63
End: 2018-07-06
Attending: FAMILY MEDICINE
Payer: COMMERCIAL

## 2018-07-06 ENCOUNTER — MYC MEDICAL ADVICE (OUTPATIENT)
Dept: FAMILY MEDICINE | Facility: CLINIC | Age: 63
End: 2018-07-06

## 2018-07-06 PROCEDURE — 93922 UPR/L XTREMITY ART 2 LEVELS: CPT

## 2018-07-06 NOTE — TELEPHONE ENCOUNTER
No blood flow abnormality to the feet at rest was noted, an MORGAN with exercise may be needed to rule out claudication if occurring with exertion/walking.  Regardless quitting smoking is very crucial.

## 2018-07-06 NOTE — TELEPHONE ENCOUNTER
Please see Shoto message below and advise. Preliminary results are in.    Ava Cortez RN  [facility]

## 2018-07-06 NOTE — TELEPHONE ENCOUNTER
Pt was given provider's message via [employer].    Ava Cortez RN  [facility]
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
## 2018-07-10 ENCOUNTER — OFFICE VISIT (OUTPATIENT)
Dept: FAMILY MEDICINE | Facility: CLINIC | Age: 63
End: 2018-07-10
Payer: COMMERCIAL

## 2018-07-10 VITALS
TEMPERATURE: 97.7 F | HEART RATE: 60 BPM | SYSTOLIC BLOOD PRESSURE: 126 MMHG | DIASTOLIC BLOOD PRESSURE: 74 MMHG | OXYGEN SATURATION: 96 % | WEIGHT: 152.4 LBS | HEIGHT: 68 IN | BODY MASS INDEX: 23.1 KG/M2 | RESPIRATION RATE: 13 BRPM

## 2018-07-10 DIAGNOSIS — M79.605 PAIN IN BOTH LOWER EXTREMITIES: Primary | ICD-10-CM

## 2018-07-10 DIAGNOSIS — M79.604 PAIN IN BOTH LOWER EXTREMITIES: Primary | ICD-10-CM

## 2018-07-10 DIAGNOSIS — M79.10 MYALGIA: ICD-10-CM

## 2018-07-10 LAB
CK SERPL-CCNC: 235 U/L (ref 30–300)
CRP SERPL-MCNC: <2.9 MG/L (ref 0–8)

## 2018-07-10 PROCEDURE — 36415 COLL VENOUS BLD VENIPUNCTURE: CPT | Performed by: FAMILY MEDICINE

## 2018-07-10 PROCEDURE — 99213 OFFICE O/P EST LOW 20 MIN: CPT | Performed by: FAMILY MEDICINE

## 2018-07-10 PROCEDURE — 86140 C-REACTIVE PROTEIN: CPT | Performed by: FAMILY MEDICINE

## 2018-07-10 PROCEDURE — 82550 ASSAY OF CK (CPK): CPT | Performed by: FAMILY MEDICINE

## 2018-07-10 NOTE — MR AVS SNAPSHOT
After Visit Summary   7/10/2018    Kailash Murray    MRN: [id number]           Patient Information     Date Of Birth          1955        Visit Information        Provider Department      7/10/2018 11:00 AM Sanchez Mallory MD [facility]        Today's Diagnoses     Pain in both lower extremities    -  1    Myalgia          Care Instructions    [facility]    If you have any questions regarding to your visit please contact your care team:       Team Purple:   Clinic Hours Telephone Number   Dr. Jazzmine Rodriguez   7am-7pm  Monday - Thursday   7am-5pm  Fridays  [phone]  (Appointment scheduling available 24/7)    Questions about your recent visit?   Team Line:  [phone]   Urgent Care - [place] and [facility] - 11am-9pm Monday-Friday Saturday-Sunday- 9am-5pm   [place] - 5pm-9pm Monday-Friday Saturday-Sunday- 9am-5pm  [phone] - [place]  [phone] [facility]       What options do I have for a visit other than an office visit? We offer electronic visits (e-visits) and telephone visits, when medically appropriate.  Please check with your medical insurance to see if these types of visits are covered, as you will be responsible for any charges that are not paid by your insurance.      You can use [employer] (secure electronic communication) to access to your chart, send your primary care provider a message, or make an appointment. Ask a team member how to get started.     For a price quote for your services, please call our Consumer Price Line at [phone] or our Imaging Cost estimation line at [phone] (for imaging tests).    Alirio Talavera            Follow-ups after your visit        Who to contact     If you have questions or need follow up information about today's clinic visit or your schedule please contact [facility] directly at [phone].  Normal or  "non-critical lab and imaging results will be communicated to you by MyChart, letter or phone within 4 business days after the clinic has received the results. If you do not hear from us within 7 days, please contact the clinic through Looxcie or phone. If you have a critical or abnormal lab result, we will notify you by phone as soon as possible.  Submit refill requests through Looxcie or call your pharmacy and they will forward the refill request to us. Please allow 3 business days for your refill to be completed.          Additional Information About Your Visit        Looxcie Information     Looxcie gives you secure access to your electronic health record. If you see a primary care provider, you can also send messages to your care team and make appointments. If you have questions, please call your primary care clinic.  If you do not have a primary care provider, please call [phone] and they will assist you.        Care EveryWhere ID     This is your Care EveryWhere ID. This could be used by other organizations to access your [place] medical records  [id number]        Your Vitals Were     Pulse Temperature Respirations Height Pulse Oximetry BMI (Body Mass Index)    60 97.7  F (36.5  C) (Oral) 13 5' 8\" (1.727 m) 96% 23.17 kg/m2       Blood Pressure from Last 3 Encounters:   07/10/18 126/74   06/26/18 120/50   06/08/18 138/80    Weight from Last 3 Encounters:   07/10/18 152 lb 6.4 oz (69.1 kg)   06/26/18 154 lb (69.9 kg)   06/01/18 147 lb 8 oz (66.9 kg)              We Performed the Following     CK total     CRP, inflammation        Primary Care Provider Office Phone # Fax #    Jazzmine Vitale [phone] [address] [facility] [phone]        Equal Access to Services     [facility] SUDEEP AH: Hadii patricio Calabrese, wazulmada gay, qaybta kaalmajonnathan kirk, maricruz ortiz. So [facility] [phone].    ATENCIÓN: Si ingala español, milad a doyle " disposición servicios gratuitos de asistencia lingüística. Jossue benoit [phone].    We comply with applicable federal civil rights laws and [place] laws. We do not discriminate on the basis of race, color, national origin, age, disability, sex, sexual orientation, or gender identity.            Thank you!     Thank you for choosing [facility]  for your care. Our goal is always to provide you with excellent care. Hearing back from our patients is one way we can continue to improve our services. Please take a few minutes to complete the written survey that you may receive in the mail after your visit with us. Thank you!             Your Updated Medication List - Protect others around you: Learn how to safely use, store and throw away your medicines at www.disposemymeds.org.          This list is accurate as of 7/10/18 11:24 AM.  Always use your most recent med list.                   Brand Name Dispense Instructions for use Diagnosis    amLODIPine 5 MG tablet    NORVASC    90 tablet    Take 1 tablet (5 mg) by mouth daily    Benign essential hypertension       aspirin 81 MG tablet     30 tablet    Take 1 tablet (81 mg) by mouth daily    Hypertension goal BP (blood pressure) < 140/90       BETA BLOCKER NOT PRESCRIBED (INTENTIONAL)      Beta Blocker not prescribed intentionally due to EF > 40 % (ejection fraction)    Chronic diastolic heart failure (H)       losartan 100 MG tablet    COZAAR    90 tablet    Take 1 tablet (100 mg) by mouth daily    Benign essential hypertension       Magnesium Citrate 125 MG Caps           Multi-vitamin Tabs tablet      Take 1 tablet by mouth daily        VITAMIN C PO      Take 1,000 mg by mouth daily

## 2018-07-10 NOTE — NURSING NOTE
"Chief Complaint   Patient presents with     Results     Imaging results from US      Musculoskeletal Problem     Leg Pain     Initial /74 (BP Location: Left arm, Patient Position: Chair, Cuff Size: Adult Regular)  Pulse 60  Temp 97.7  F (36.5  C) (Oral)  Resp 13  Ht 5' 8\" (1.727 m)  Wt 152 lb 6.4 oz (69.1 kg)  SpO2 96%  BMI 23.17 kg/m2 Estimated body mass index is 23.17 kg/(m^2) as calculated from the following:    Height as of this encounter: 5' 8\" (1.727 m).    Weight as of this encounter: 152 lb 6.4 oz (69.1 kg).  BP completed using cuff size: regular    Aliiro Talavera  "

## 2018-07-10 NOTE — PATIENT INSTRUCTIONS
[facility]    If you have any questions regarding to your visit please contact your care team:       Team Purple:   Clinic Hours Telephone Number   Dr. Jazzmine Rodriguez   7am-7pm  Monday - Thursday   7am-5pm  Fridays  [phone]  (Appointment scheduling available 24/7)    Questions about your recent visit?   Team Line:  [phone]   Urgent Care - [place] and [facility] - 11am-9pm Monday-Friday Saturday-Sunday- 9am-5pm   [place] - 5pm-9pm Monday-Friday Saturday-Sunday- 9am-5pm  [phone] - [place]  [phone] - [place]       What options do I have for a visit other than an office visit? We offer electronic visits (e-visits) and telephone visits, when medically appropriate.  Please check with your medical insurance to see if these types of visits are covered, as you will be responsible for any charges that are not paid by your insurance.      You can use Oink (secure electronic communication) to access to your chart, send your primary care provider a message, or make an appointment. Ask a team member how to get started.     For a price quote for your services, please call our Consumer Price Line at [phone] or our Imaging Cost estimation line at [phone] (for imaging tests).    Alirio Talavera

## 2018-08-15 ENCOUNTER — OFFICE VISIT (OUTPATIENT)
Dept: FAMILY MEDICINE | Facility: CLINIC | Age: 63
End: 2018-08-15
Payer: COMMERCIAL

## 2018-08-15 ENCOUNTER — RADIANT APPOINTMENT (OUTPATIENT)
Dept: GENERAL RADIOLOGY | Facility: CLINIC | Age: 63
End: 2018-08-15
Attending: FAMILY MEDICINE
Payer: COMMERCIAL

## 2018-08-15 VITALS
HEART RATE: 76 BPM | OXYGEN SATURATION: 96 % | HEIGHT: 68 IN | SYSTOLIC BLOOD PRESSURE: 150 MMHG | RESPIRATION RATE: 20 BRPM | TEMPERATURE: 99.1 F | BODY MASS INDEX: 23.04 KG/M2 | DIASTOLIC BLOOD PRESSURE: 70 MMHG | WEIGHT: 152 LBS

## 2018-08-15 DIAGNOSIS — M79.604 BILATERAL LEG PAIN: Primary | ICD-10-CM

## 2018-08-15 DIAGNOSIS — M79.605 BILATERAL LEG PAIN: ICD-10-CM

## 2018-08-15 DIAGNOSIS — M79.604 BILATERAL LEG PAIN: ICD-10-CM

## 2018-08-15 DIAGNOSIS — I10 BENIGN ESSENTIAL HYPERTENSION: ICD-10-CM

## 2018-08-15 DIAGNOSIS — M79.605 BILATERAL LEG PAIN: Primary | ICD-10-CM

## 2018-08-15 PROCEDURE — 99213 OFFICE O/P EST LOW 20 MIN: CPT | Performed by: FAMILY MEDICINE

## 2018-08-15 PROCEDURE — 72100 X-RAY EXAM L-S SPINE 2/3 VWS: CPT

## 2018-08-15 NOTE — PATIENT INSTRUCTIONS
Call the imaging center at [phone] or  [phone] to schedule your MRI.    Did you know you can lower your blood pressure with your daily habits?    *Losing 20 pounds of weight lowers blood pressure 5 to 20 points.  *Eating a low-fat diet rich in fruits, vegetables and low-fat dairy lowers blood pressure 8 to 14 points.  *Eating a low-salt diet lowers blood pressure 2 to 8 points.  *Exercising regularly lowers blood pressure 4 to 9 points.  *Reducing alcohol use lowers blood pressure 2 to 4 points.    [facility]    If you have any questions regarding to your visit please contact your care team:       Team Red:   Clinic Hours Telephone Number   Dr. Carolyne Lynne, NP   7am-7pm  Monday - Thursday   7am-5pm  Fridays  [phone]  (Appointment scheduling available 24/7)    Questions about your recent visit?   Team Line  [phone]   Urgent Care - [place] and [facility] - 11am-9pm Monday-Friday Saturday-Sunday- 9am-5pm   [place] - 5pm-9pm Monday-Friday Saturday-Sunday- 9am-5pm  [phone] - [place]  [phone] - [place]       What options do I have for a visit other than an office visit? We offer electronic visits (e-visits) and telephone visits, when medically appropriate.  Please check with your medical insurance to see if these types of visits are covered, as you will be responsible for any charges that are not paid by your insurance.      You can use [employer] (secure electronic communication) to access to your chart, send your primary care provider a message, or make an appointment. Ask a team member how to get started.     For a price quote for your services, please call our Consumer Price Line at [phone] or our Imaging Cost estimation line at [phone] (for imaging tests).      Sonja GOULD MA

## 2018-08-15 NOTE — PROGRESS NOTES
"SUBJECTIVE:  Kailash Murray is a 63 year old male smoker with hypertension and hypercholesterolemia who presents with ongoing bilateral leg pain, burning, with accompanying tingling dysesthesias but no weakness or significant back pain. Symptom onset has been gradual, worsening for a time period of months. Severity is described as moderate, generalized and with excercise. Course of his symptoms over time is worsening. Ibuprofen and a topical menthol ointment help. Prior evaluation reviewed today, showing normal CK, ESR, and MORGAN.     OBJECTIVE:  EXAM:  /70  Pulse 76  Temp 99.1  F (37.3  C) (Oral)  Resp 20  Ht 5' 8\" (1.727 m)  Wt 152 lb (68.9 kg)  SpO2 96%  BMI 23.11 kg/m2   Constitutional: alert and no distress   Cardiovascular: PMI normal. No lifts, heaves, or thrills. RRR. No murmurs, clicks gallops or rub  Respiratory: Percussion normal. Good diaphragmatic excursion. Lungs clear  Psychiatric: mentation appears normal and affect normal/bright  Head: Normocephalic. No masses, lesions, tenderness or abnormalities  NEURO: Gait normal. Reflexes normal and symmetric. Sensation grossly WNL.  SKIN: no suspicious lesions or rashes  JOINT/EXTREMITIES: extremities normal- no gross deformities noted, gait normal and normal muscle tone; negative straight leg raise     Results for orders placed or performed in visit on 07/10/18   CK total   Result Value Ref Range    CK Total 235 30 - 300 U/L   CRP, inflammation   Result Value Ref Range    CRP Inflammation <2.9 0.0 - 8.0 mg/L       ASSESSMENT/PLAN:  (M79.604,  M79.605) Bilateral leg pain  (primary encounter diagnosis)  Comment: Differential diagnoses would include: neurogenic claudication   Plan: XR Lumbar Spine 2/3 Views, MR Lumbar Spine w/o         Contrast        Over the counter pain medication as needed, ice or heat as he finds helpful, avoidance of aggravating activities, and consideration of physical therapy referral pending results.     (I10) Benign essential " hypertension  Comment: uncontrolled   Plan: Follow up ancillary blood pressure recheck in one month        Carolyne Cornejo MD

## 2018-08-15 NOTE — MR AVS SNAPSHOT
After Visit Summary   8/15/2018    Kailash Murray    MRN: [id number]           Patient Information     Date Of Birth          1955        Visit Information        Provider Department      8/15/2018 3:00 PM Craolyne Cornejo MD [facility]        Today's Diagnoses     Bilateral leg pain    -  1    Benign essential hypertension        Hyperlipidemia LDL goal <130        Elevated fasting glucose          Care Instructions    Call the imaging center at [phone] or  [phone] to schedule your MRI.    Did you know you can lower your blood pressure with your daily habits?    *Losing 20 pounds of weight lowers blood pressure 5 to 20 points.  *Eating a low-fat diet rich in fruits, vegetables and low-fat dairy lowers blood pressure 8 to 14 points.  *Eating a low-salt diet lowers blood pressure 2 to 8 points.  *Exercising regularly lowers blood pressure 4 to 9 points.  *Reducing alcohol use lowers blood pressure 2 to 4 points.    [facility]    If you have any questions regarding to your visit please contact your care team:       Team Red:   Clinic Hours Telephone Number   Dr. Carolyne Lynne, NP   7am-7pm  Monday - Thursday   7am-5pm  Fridays  [phone]  (Appointment scheduling available 24/7)    Questions about your recent visit?   Team Line  [phone]   Urgent Care - [place] and [place] - 11am-9pm Monday-Friday Saturday-Sunday- 9am-5pm   [place] - 5pm-9pm Monday-Friday Saturday-Sunday- 9am-5pm  [phone] - [place]  [phone] - [place]       What options do I have for a visit other than an office visit? We offer electronic visits (e-visits) and telephone visits, when medically appropriate.  Please check with your medical insurance to see if these types of visits are covered, as you will be responsible for any charges that are not paid by your insurance.      You can use MyChart (secure  electronic communication) to access to your chart, send your primary care provider a message, or make an appointment. Ask a team member how to get started.     For a price quote for your services, please call our Consumer Price Line at [phone] or our Imaging Cost estimation line at [phone] (for imaging tests).      Sonja GOULD MA            Follow-ups after your visit        Follow-up notes from your care team     Return in about 1 month (around 9/15/2018) for free nurse only blood pressure check.      Future tests that were ordered for you today     Open Future Orders        Priority Expected Expires Ordered    XR Lumbar Spine 2/3 Views Routine 8/15/2018 8/15/2019 8/15/2018    MR Lumbar Spine w/o Contrast Routine  8/15/2019 8/15/2018            Who to contact     If you have questions or need follow up information about today's clinic visit or your schedule please contact [facility] directly at [phone].  Normal or non-critical lab and imaging results will be communicated to you by Fuzehart, letter or phone within 4 business days after the clinic has received the results. If you do not hear from us within 7 days, please contact the clinic through MileIQ or phone. If you have a critical or abnormal lab result, we will notify you by phone as soon as possible.  Submit refill requests through MileIQ or call your pharmacy and they will forward the refill request to us. Please allow 3 business days for your refill to be completed.          Additional Information About Your Visit        FuzeharActionBase Information     Mile[employer] gives you secure access to your electronic health record. If you see a primary care provider, you can also send messages to your care team and make appointments. If you have questions, please call your primary care clinic.  If you do not have a primary care provider, please call [phone] and they will assist you.        Care EveryWhere ID     This is your Care EveryWhere ID.  "This could be used by other organizations to access your [place] medical records  [id number]        Your Vitals Were     Pulse Temperature Respirations Height Pulse Oximetry BMI (Body Mass Index)    76 99.1  F (37.3  C) (Oral) 20 5' 8\" (1.727 m) 96% 23.11 kg/m2       Blood Pressure from Last 3 Encounters:   08/15/18 160/70   07/10/18 126/74   06/26/18 120/50    Weight from Last 3 Encounters:   08/15/18 152 lb (68.9 kg)   07/10/18 152 lb 6.4 oz (69.1 kg)   06/26/18 154 lb (69.9 kg)               Primary Care Provider Office Phone # Fax #    Jazzmine Vitale [phone] [address] [facility] [address]        Equal Access to Services     [facility] : Hadii patricio ballo Soюлия, waaxda luqadaha, qaybta kaalmada adesadieyada, maricruz camp . So [facility] [phone].    ATENCIÓN: Si habla español, tiene a doyle disposición servicios gratuitos de asistencia lingüística. Jossue al [phone].    We comply with applicable federal civil rights laws and [place] laws. We do not discriminate on the basis of race, color, national origin, age, disability, sex, sexual orientation, or gender identity.            Thank you!     Thank you for choosing [facility]  for your care. Our goal is always to provide you with excellent care. Hearing back from our patients is one way we can continue to improve our services. Please take a few minutes to complete the written survey that you may receive in the mail after your visit with us. Thank you!             Your Updated Medication List - Protect others around you: Learn how to safely use, store and throw away your medicines at www.disposemymeds.org.          This list is accurate as of 8/15/18  3:41 PM.  Always use your most recent med list.                   Brand Name Dispense Instructions for use Diagnosis    amLODIPine 5 MG tablet    NORVASC    90 tablet    Take 1 tablet (5 mg) by mouth daily    Benign " essential hypertension       aspirin 81 MG tablet     30 tablet    Take 1 tablet (81 mg) by mouth daily    Hypertension goal BP (blood pressure) < 140/90       BETA BLOCKER NOT PRESCRIBED (INTENTIONAL)      Beta Blocker not prescribed intentionally due to EF > 40 % (ejection fraction)    Chronic diastolic heart failure (H)       losartan 100 MG tablet    COZAAR    90 tablet    Take 1 tablet (100 mg) by mouth daily    Benign essential hypertension       Magnesium Citrate 125 MG Caps           Multi-vitamin Tabs tablet      Take 1 tablet by mouth daily

## 2018-08-16 ENCOUNTER — MYC MEDICAL ADVICE (OUTPATIENT)
Dept: FAMILY MEDICINE | Facility: CLINIC | Age: 63
End: 2018-08-16

## 2018-08-16 NOTE — PROGRESS NOTES
Kailash,    You do have degenerative (arthritis related) changes throughout your lower back. Let's proceed with MRI as planned.     Carolyne Cornejo MD

## 2018-08-24 ENCOUNTER — MYC MEDICAL ADVICE (OUTPATIENT)
Dept: FAMILY MEDICINE | Facility: CLINIC | Age: 63
End: 2018-08-24

## 2018-08-24 ENCOUNTER — RADIANT APPOINTMENT (OUTPATIENT)
Dept: MRI IMAGING | Facility: CLINIC | Age: 63
End: 2018-08-24
Attending: FAMILY MEDICINE
Payer: COMMERCIAL

## 2018-08-24 DIAGNOSIS — M79.604 BILATERAL LEG PAIN: ICD-10-CM

## 2018-08-24 DIAGNOSIS — M79.605 BILATERAL LEG PAIN: ICD-10-CM

## 2018-08-24 DIAGNOSIS — M48.062 SPINAL STENOSIS OF LUMBAR REGION WITH NEUROGENIC CLAUDICATION: Primary | ICD-10-CM

## 2018-08-24 PROCEDURE — 72148 MRI LUMBAR SPINE W/O DYE: CPT | Mod: TC

## 2018-08-27 ENCOUNTER — MYC MEDICAL ADVICE (OUTPATIENT)
Dept: FAMILY MEDICINE | Facility: CLINIC | Age: 63
End: 2018-08-27

## 2018-09-04 ENCOUNTER — OFFICE VISIT (OUTPATIENT)
Dept: NEUROSURGERY | Facility: CLINIC | Age: 63
End: 2018-09-04
Payer: COMMERCIAL

## 2018-09-04 VITALS
OXYGEN SATURATION: 98 % | TEMPERATURE: 97.1 F | HEIGHT: 68 IN | WEIGHT: 152 LBS | HEART RATE: 75 BPM | DIASTOLIC BLOOD PRESSURE: 89 MMHG | SYSTOLIC BLOOD PRESSURE: 186 MMHG | BODY MASS INDEX: 23.04 KG/M2

## 2018-09-04 DIAGNOSIS — M43.10 ACQUIRED SPONDYLOLISTHESIS: Primary | ICD-10-CM

## 2018-09-04 PROCEDURE — 99244 OFF/OP CNSLTJ NEW/EST MOD 40: CPT | Performed by: NEUROLOGICAL SURGERY

## 2018-09-04 ASSESSMENT — PAIN SCALES - GENERAL: PAINLEVEL: MILD PAIN (3)

## 2018-09-04 NOTE — LETTER
9/4/2018         RE: Kailash Murray  [address] [place] [id number]        Dear Colleague,    Thank you for referring your patient, Kailash Murray, to the [facility]. Please see a copy of my visit note below.    Neurosurgery Consult Note   [facility]        CC: Bilateral lower extremity pain in the L5 distribute    Primary care Provider: Jazzmine Vitale    I was asked to see the patient by:  No referring provider defined for this encounter.      Jamul: Kailash Murray is a 63 year old male that presents to clinic with a complaint of bilateral lower extremity pain with prolonged standing and activity.  The patient denies having any back pain and says that this pain has been progressively getting worse over the past few months.  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  He is not having significant low back pain or weakness in his legs.      Past Medical History:   Diagnosis Date     Adenomatous polyp 5-2013    needs colonsocpy every 5 years     Alcohol abuse      Asthma      Cervical radiculopathy      CHF (congestive heart failure) (H) 1-2011     COPD (chronic obstructive pulmonary disease) (H)      DDD (degenerative disc disease), cervical      DDD (degenerative disc disease), lumbar      DDD (degenerative disc disease), lumbosacral      Dupuytren's contracture 3/2/2015     Dyslipidemia      Elevated fasting glucose      HTN (hypertension)      Lumbar spinal stenosis      Moderate major depression (H) 11/9/2011     Renal insufficiency      Trigger finger, acquired 3/2/2015       Past Surgical History:   Procedure Laterality Date     CATARACT IOL, RT/LT  8/17/2010    left     CATARACT IOL, RT/LT  8/31/2010    right     COLONOSCOPY  2013     COLONOSCOPY WITH CO2 INSUFFLATION N/A 6/8/2018    Procedure:  COLONOSCOPY WITH CO2 INSUFFLATION;  V76.51 (ICD-9-CM) - Z12.11 (ICD-10-CM) - Screen for colon cancer  BMI 22.84  BCBS/, patient refused taxid# and Dr GILLESPIE#  Dr Vitale is referring doctor  [facility]- Fax# [phone];  Surgeon: Duane, William Charles, MD;  Location: [facility] OR     SURGICAL HISTORY OF -       cyst removal from back        Current Outpatient Prescriptions   Medication     amLODIPine (NORVASC) 5 MG tablet     aspirin 81 MG tablet     BETA BLOCKER NOT PRESCRIBED, INTENTIONAL,     losartan (COZAAR) 100 MG tablet     Magnesium Citrate 125 MG CAPS     multivitamin, therapeutic with minerals (MULTI-VITAMIN) TABS tablet     No current facility-administered medications for this visit.        Allergies   Allergen Reactions     Coreg [Carvedilol]      With alcohol caused him to be unresponsive     Dye [Contrast Dye]      Rash and hypotension     Hctz      syncope and dehydration with alcohol use     Paxil [Paroxetine]      SLEEPY     Penicillins Rash     Triple Antibiotic Rash       Social History     Social History     Marital status:      Spouse name: N/A     Number of children: 6     Years of education: N/A     Occupational History     /[employer]     Social History Main Topics     Smoking status: Current Every Day Smoker     Packs/day: 1.00     Years: 40.00     Types: Cigarettes     Start date: 3/20/1972     Smokeless tobacco: Never Used     Alcohol use 8.4 oz/week     14 Shots of liquor per week      Comment: history of abuse      Drug use: No     Sexual activity: Yes     Partners: Female     Birth control/ protection: None     Other Topics Concern     Parent/Sibling W/ Cabg, Mi Or Angioplasty Before 65f 55m? Yes     father, older sis; older brother  at 60 of heart attack     Social History Narrative       Family History   Problem Relation Age of Onset     Cancer Mother      Cerebrovascular Disease Mother      Thyroid Disease Mother      Other Cancer  Mother      HEART DISEASE Father 50     MI      Hypertension Father      Hyperlipidemia Father      Coronary Artery Disease Father      Diabetes Sister      HEART DISEASE Sister      Had a heart valv replaced      Coronary Artery Disease Sister      Hypertension Sister      Anxiety Disorder Sister      Obesity Sister      Chemical Addiction Brother      Diabetes Brother       age 63, MI     Substance Abuse Brother      Unknown/Adopted Maternal Grandmother      Unknown/Adopted Maternal Grandfather      Unknown/Adopted Paternal Grandmother      Unknown/Adopted Paternal Grandfather      Anxiety Disorder Brother      Anxiety Disorder Brother          Review Of Systems  Skin: negative  Eyes: negative  Ears/Nose/Throat: negative  Respiratory: No shortness of breath, dyspnea on exertion, cough, or hemoptysis  Cardiovascular: negative  Gastrointestinal: negative  Genitourinary: negative  Musculoskeletal: as above  Neurologic: as above  Psychiatric: negative  Hematologic/Lymphatic/Immunologic: negative  Endocrine: negative    B/P: 186/93, T: 97.1, P: 75, R: Data Unavailable    Examination:  Normal affect and mood  No obvious labored respiration  No skin lesions noted   No obvious pitting edema  No abnormal psychiatric behavior  No obvious musculoskeletal abnormalities   Awake  Alert  Oriented x 3  Speech clear  Cranial nerves II - XII intact  Face symmetric  Back nontender  Normal ROM of back  Motor exam symmetric motor strength with no focal weakness  Sensation intact  Clonus negative  DTR 1 +  Negative lase'jase's sign   Ambulation stable    Imaging:   MRI lumbar spine and AP lateral x-rays lumbar spine show L3-S1 stenosis with bilateral pars defect at L5-S1 grade 1 spondylolisthesis and foraminal stenosis at L5-S1 and L4-5      Assessment/Plan:   The patient will would like to try to cut back at work with his ambulation and decreases work hours from 11 hours a day to 8 hours a day.  He will also try physical therapy  and epidural steroid injection.  He is no better and his pain persist I recommend he have an L3-S1 transforaminal lumbar interbody fusion.  The patient will call if he wants to proceed.      Wili Peraza MD, MS, FAANS  Neurosurgeon  [facility]  [address] [place] [address] [place] [address]  Tel [phone]    Again, thank you for allowing me to participate in the care of your patient.        Sincerely,        Wili Peraza MD

## 2018-09-04 NOTE — PROGRESS NOTES
Neurosurgery Consult Note   [facility]        CC: Bilateral lower extremity pain in the L5 distribute    Primary care Provider: Jazzmine Vitale was asked to see the patient by:  No referring provider defined for this encounter.      Ely Shoshone: Kailash Murray is a 63 year old male that presents to clinic with a complaint of bilateral lower extremity pain with prolonged standing and activity.  The patient denies having any back pain and says that this pain has been progressively getting worse over the past few months.  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  He is not having significant low back pain or weakness in his legs.      Past Medical History:   Diagnosis Date     Adenomatous polyp 5-2013    needs colonsocpy every 5 years     Alcohol abuse      Asthma      Cervical radiculopathy      CHF (congestive heart failure) (H) 1-2011     COPD (chronic obstructive pulmonary disease) (H)      DDD (degenerative disc disease), cervical      DDD (degenerative disc disease), lumbar      DDD (degenerative disc disease), lumbosacral      Dupuytren's contracture 3/2/2015     Dyslipidemia      Elevated fasting glucose      HTN (hypertension)      Lumbar spinal stenosis      Moderate major depression (H) 11/9/2011     Renal insufficiency      Trigger finger, acquired 3/2/2015       Past Surgical History:   Procedure Laterality Date     CATARACT IOL, RT/LT  8/17/2010    left     CATARACT IOL, RT/LT  8/31/2010    right     COLONOSCOPY  2013     COLONOSCOPY WITH CO2 INSUFFLATION N/A 6/8/2018    Procedure: COLONOSCOPY WITH CO2 INSUFFLATION;  V76.51 (ICD-9-CM) - Z12.11 (ICD-10-CM) - Screen for colon cancer  BMI 22.84  BCBS/, patient refused taxid# and Dr GILLESPIE#  Dr Vitale is referring doctor  [facility] on [facility]- Fax#  [phone];  Surgeon: Duane, William Charles, MD;  Location:  OR     SURGICAL HISTORY OF -       cyst removal from back        Current Outpatient Prescriptions   Medication     amLODIPine (NORVASC) 5 MG tablet     aspirin 81 MG tablet     BETA BLOCKER NOT PRESCRIBED, INTENTIONAL,     losartan (COZAAR) 100 MG tablet     Magnesium Citrate 125 MG CAPS     multivitamin, therapeutic with minerals (MULTI-VITAMIN) TABS tablet     No current facility-administered medications for this visit.        Allergies   Allergen Reactions     Coreg [Carvedilol]      With alcohol caused him to be unresponsive     Dye [Contrast Dye]      Rash and hypotension     Hctz      syncope and dehydration with alcohol use     Paxil [Paroxetine]      SLEEPY     Penicillins Rash     Triple Antibiotic Rash       Social History     Social History     Marital status:      Spouse name: N/A     Number of children: 6     Years of education: N/A     Occupational History     /[employer]     Social History Main Topics     Smoking status: Current Every Day Smoker     Packs/day: 1.00     Years: 40.00     Types: Cigarettes     Start date: 3/20/1972     Smokeless tobacco: Never Used     Alcohol use 8.4 oz/week     14 Shots of liquor per week      Comment: history of abuse      Drug use: No     Sexual activity: Yes     Partners: Female     Birth control/ protection: None     Other Topics Concern     Parent/Sibling W/ Cabg, Mi Or Angioplasty Before 65f 55m? Yes     father, older sis; older brother  at 60 of heart attack     Social History Narrative       Family History   Problem Relation Age of Onset     Cancer Mother      Cerebrovascular Disease Mother      Thyroid Disease Mother      Other Cancer Mother      HEART DISEASE Father 50     MI      Hypertension Father      Hyperlipidemia Father      Coronary Artery Disease Father      Diabetes Sister      HEART DISEASE Sister      Had a heart valv replaced      Coronary Artery Disease  Sister      Hypertension Sister      Anxiety Disorder Sister      Obesity Sister      Chemical Addiction Brother      Diabetes Brother       age 63, MI     Substance Abuse Brother      Unknown/Adopted Maternal Grandmother      Unknown/Adopted Maternal Grandfather      Unknown/Adopted Paternal Grandmother      Unknown/Adopted Paternal Grandfather      Anxiety Disorder Brother      Anxiety Disorder Brother          Review Of Systems  Skin: negative  Eyes: negative  Ears/Nose/Throat: negative  Respiratory: No shortness of breath, dyspnea on exertion, cough, or hemoptysis  Cardiovascular: negative  Gastrointestinal: negative  Genitourinary: negative  Musculoskeletal: as above  Neurologic: as above  Psychiatric: negative  Hematologic/Lymphatic/Immunologic: negative  Endocrine: negative    B/P: 186/93, T: 97.1, P: 75, R: Data Unavailable    Examination:  Normal affect and mood  No obvious labored respiration  No skin lesions noted   No obvious pitting edema  No abnormal psychiatric behavior  No obvious musculoskeletal abnormalities   Awake  Alert  Oriented x 3  Speech clear  Cranial nerves II - XII intact  Face symmetric  Back nontender  Normal ROM of back  Motor exam symmetric motor strength with no focal weakness  Sensation intact  Clonus negative  DTR 1 +  Negative lase'jase's sign   Ambulation stable    Imaging:   MRI lumbar spine and AP lateral x-rays lumbar spine show L3-S1 stenosis with bilateral pars defect at L5-S1 grade 1 spondylolisthesis and foraminal stenosis at L5-S1 and L4-5      Assessment/Plan:   The patient will would like to try to cut back at work with his ambulation and decreases work hours from 11 hours a day to 8 hours a day.  He will also try physical therapy and epidural steroid injection.  He is no better and his pain persist I recommend he have an L3-S1 transforaminal lumbar interbody fusion.  The patient will call if he wants to proceed.      Wili Peraza MD, MS,  FAANS  Neurosurgeon  [facility]  [address] [place] [address]. [place]  [place] F[place] [address]  Tel [phone]

## 2018-09-04 NOTE — MR AVS SNAPSHOT
After Visit Summary   9/4/2018    Kailash Murray    MRN: [id number]           Patient Information     Date Of Birth          1955        Visit Information        Provider Department      9/4/2018 3:20 PM Wili Peraza MD [facility]        Today's Diagnoses     Acquired spondylolisthesis    -  1    Lumbago          Care Instructions    1.referral for physical therapy  2.referral for epidural steroid injection if pain persists   Please call our clinic with any questions or concerns: [phone]            Follow-ups after your visit        Additional Services     [facility] PT, HAND, AND CHIROPRACTIC REFERRAL       **This order will print in the [facility] Scheduling Office**    Physical Therapy, Hand Therapy and Chiropractic Care are available through:    *[facility]  *[facility]  *[facility]    Call one number to schedule at any of the above locations: [phone].    Your provider has referred you to: Physical Therapy at [facility] or [facility]    Indication/Reason for Referral: Low Back Pain  Onset of Illness:   Therapy Orders: Evaluate and Treat  Special Programs: None  Special Request: None    Phil Villarreal      Additional Comments for the Therapist or Chiropractor:     Please be aware that coverage of these services is subject to the terms and limitations of your health insurance plan.  Call member services at your health plan with any benefit or coverage questions.      Please bring the following to your appointment:    *Your personal calendar for scheduling future appointments  *Comfortable clothing            PAIN MANAGEMENT REFERRAL       Your provider has referred you to: FM: [facility] -    Reason for Referral: Procedure Order Epidural:  Lumbar (Advanced imaging required in the last 3 years)  L5-S1      What is your diagnosis for the patient's pain? Spondylollishtesis, low back pain      For any  questions, contact the [facility] at [phone].     **ANY DIAGNOSTIC TESTS THAT ARE NOT IN EPIC SHOULD BE SENT TO THE PAIN CENTER**    REGARDING OPIOID MEDICATIONS:  The discussion of opioids management, appropriateness of therapy, and dosing will be discussed in patients being seen for evaluation.  The pain management clinics are not long-term prescribing clinics, with transition of prescribing of medications ultimately going back to the referring provider/PCP.  If prescribing is taken over at the pain clinic, it is in actively involved patients whom are appropriate for opioids, urine drug screening is completed, and long-term prescribing plan has been determined.  Therefore, we will not be automatically taking over prescribing at the patient's first visit.  Is this agreeable to you? agrees.     Please be aware that coverage of these services is subject to the terms and limitations of your health insurance plan.  Call member services at your health plan with any benefit or coverage questions.      Please bring the following with you to your appointment:    (1) Any X-Rays, CTs or MRIs which have been performed.  Contact the facility where they were done to arrange for  prior to your scheduled appointment.    (2) List of current medications   (3) This referral request   (4) Any documents/labs given to you for this referral                  Your next 10 appointments already scheduled     Sep 13, 2018  4:00 PM CDT   Nurse Only with [facility]   Bharti Fenton ([facility] F[facility])    [address] [facility]  [place] [address] [phone]              Who to contact     If you have questions or need follow up information about today's clinic visit or your schedule please contact BHARTI FENTON directly at [phone].  Normal or non-critical lab and imaging results will be communicated to you by MyChart, letter or phone within 4 business days after the  "clinic has received the results. If you do not hear from us within 7 days, please contact the clinic through Perkle or phone. If you have a critical or abnormal lab result, we will notify you by phone as soon as possible.  Submit refill requests through Perkle or call your pharmacy and they will forward the refill request to us. Please allow 3 business days for your refill to be completed.          Additional Information About Your Visit        [email or web address] Information     Perkle gives you secure access to your electronic health record. If you see a primary care provider, you can also send messages to your care team and make appointments. If you have questions, please call your primary care clinic.  If you do not have a primary care provider, please call [phone] and they will assist you.        Care EveryWhere ID     This is your Care EveryWhere ID. This could be used by other organizations to access your [facility] medical records  [id number]        Your Vitals Were     Pulse Temperature Height Pulse Oximetry BMI (Body Mass Index)       75 97.1  F (36.2  C) (Oral) 5' 8\" (1.727 m) 98% 23.11 kg/m2        Blood Pressure from Last 3 Encounters:   09/04/18 (!) 186/93   08/15/18 150/70   07/10/18 126/74    Weight from Last 3 Encounters:   09/04/18 152 lb (68.9 kg)   08/15/18 152 lb (68.9 kg)   07/10/18 152 lb 6.4 oz (69.1 kg)              We Performed the Following     [facility] PT, HAND, AND CHIROPRACTIC REFERRAL     PAIN MANAGEMENT REFERRAL        Primary Care Provider Office Phone # Fax #    Jazzmine Vitale [phone] [address] [facility] [address]        Equal Access to Services     [facility] : Hadii patricio Calabrese, rocio rashid, qaybsanta kaalmamaricruz luong. So [facility] [phone].    ATENCIÓN: Si habla español, tiene a doyle disposición servicios gratuitos de asistencia lingüística. Llame al [phone].    We comply with " applicable federal civil rights laws and [place] laws. We do not discriminate on the basis of race, color, national origin, age, disability, sex, sexual orientation, or gender identity.            Thank you!     Thank you for choosing [facility]  for your care. Our goal is always to provide you with excellent care. Hearing back from our patients is one way we can continue to improve our services. Please take a few minutes to complete the written survey that you may receive in the mail after your visit with us. Thank you!             Your Updated Medication List - Protect others around you: Learn how to safely use, store and throw away your medicines at www.disposemymeds.org.          This list is accurate as of 9/4/18  3:42 PM.  Always use your most recent med list.                   Brand Name Dispense Instructions for use Diagnosis    amLODIPine 5 MG tablet    NORVASC    90 tablet    Take 1 tablet (5 mg) by mouth daily    Benign essential hypertension       aspirin 81 MG tablet     30 tablet    Take 1 tablet (81 mg) by mouth daily    Hypertension goal BP (blood pressure) < 140/90       BETA BLOCKER NOT PRESCRIBED (INTENTIONAL)      Beta Blocker not prescribed intentionally due to EF > 40 % (ejection fraction)    Chronic diastolic heart failure (H)       losartan 100 MG tablet    COZAAR    90 tablet    Take 1 tablet (100 mg) by mouth daily    Benign essential hypertension       Magnesium Citrate 125 MG Caps           Multi-vitamin Tabs tablet      Take 1 tablet by mouth daily

## 2018-09-04 NOTE — NURSING NOTE
"Kailash Murray is a 63 year old male who presents for:  Chief Complaint   Patient presents with     Neurologic Problem     referral from Dr. KADEN Guerra for bilateral leg pain        Vitals:    There were no vitals filed for this visit.    BMI:  Estimated body mass index is 23.11 kg/(m^2) as calculated from the following:    Height as of 8/15/18: 5' 8\" (1.727 m).    Weight as of 8/15/18: 152 lb (68.9 kg).    Pain Score:  Data Unavailable        Katie Vasques, ROSELYN, AAS      "

## 2018-09-04 NOTE — PATIENT INSTRUCTIONS
1.referral for physical therapy  2.referral for epidural steroid injection if pain persists   Please call our clinic with any questions or concerns: [phone]  Patient to follow up with Primary Care provider regarding elevated blood pressure.

## 2018-09-05 ENCOUNTER — TELEPHONE (OUTPATIENT)
Dept: PALLIATIVE MEDICINE | Facility: CLINIC | Age: 63
End: 2018-09-05

## 2018-09-06 NOTE — TELEPHONE ENCOUNTER
**Pt will call back to schedule - checking on ride - contrast dye allergy     Pre-screening Questions for Radiology Injections:    Injection to be done at which interventional clinic site? [facility] [place]    Instruct patient to arrive as directed prior to the scheduled appointment time:    [place] minutes before      [place]: 1 hour before     Procedure ordered by Dr. Peraza    Procedure ordered? Lumbar Epidural Steroid Injection    What insurance would patient like us to bill for this procedure? [employer], [employer]      Worker's comp or MVA (motor vehicle accident) -Any injection DO NOT SCHEDULE and route to Latanya Jackson.      [employer] - For SI joint injections, DO NOT SCHEDULE and route Gianna Borden. [employer] FREEDOM NO PA REQUIRED EFFECTIVE 2017      [employer]- MBB's must be scheduled at LEAST two weeks apart      [employer] - Any injection besides hip/shoulder/knee joint DO NOT SCHEDULE and route to Gianna Borden. She will obtain PA and call pt back to schedule procedure or notify pt of denial.        [employer]-Route to Gianna for review    Any chance of pregnancy? NO   If YES, do NOT schedule and route to RN pool    Is an  needed? No     Patient has a drive home? (mandatory) YES: INFORMED    Is patient taking any blood thinners (plavix, coumadin, jantoven, warfarin, heparin, pradaxa or dabigatran )? No   If hold needed, do NOT schedule, route to RN pool     Is patient taking any aspirin products? No     If more than 325mg/day do NOT schedule; route to RN pool     For CERVICAL procedures, hold all aspirin products for 6 days.      Does the patient have a bleeding or clotting disorder? No     If YES, okay to schedule AND route to RN nurse pool    **For any patients with platelet count <100, must be forwarded to provider**    Is patient diabetic?  No  If YES, have them bring their glucometer.    Does patient have an active infection or treated for one  within the past week? No     Is patient currently taking any antibiotics?  No     For patients on chronic, preventative, or prophylactic antibiotics, procedures may be scheduled.     For patients on antibiotics for active or recent infection:    Shaila Juan Burton, [place]-antibiotic course must have been completed for 4 days    Is patient currently taking any steroid medications? (i.e. Prednisone, Medrol)  No     For patients on steroid medications:    Shaila Juan Burton, [place]-steroid course must have been completed for 4 days    Reviewed with patient:  If you are started on any steroids or antibiotics between now and your appointment, you must contact us because it may affect our ability to perform your procedure.  Yes    Is patient actively being treated for cancer or immunocompromised? No  If YES, do NOT schedule and route to RN pool     Are you able to get on and off an exam table with minimal or no assistance? Yes  If NO, do NOT schedule and route to RN pool    Are you able to roll over and lay on your stomach with minimal or no assistance? Yes  If NO, do NOT schedule and route to RN pool     Any allergies to contrast dye, iodine, shellfish, or numbing and steroid medications? Yes - Contrast Dye  If YES, route to RN pool AND add allergy information to appointment notes    Allergies: Coreg [carvedilol]; Dye [contrast dye]; Hctz; Paxil [paroxetine]; Penicillins; and Triple antibiotic      Has the patient had a flu shot or any other vaccinations within 7 days before or after the procedure.  No     Does patient have an MRI/CT?  YES: MRI  (SI joint, hip injections, lumbar sympathetic blocks, and stellate ganglion blocks do not require an MRI)    Was the MRI done w/in the last 3 years?  Yes    Was MRI done at [facility]? Yes      If not, where was it done? N/A       If MRI was not done at [facility], [facility] or [facility] do NOT schedule and route to nursing.  If pt has an imaging disc,  the injection may be scheduled but pt has to bring disc to appt. If they show up w/out disc the injection cannot be done    Reminders (please tell patient if applicable):       Instructed pt to arrive 30 minutes early for IV start if this is for a cervical procedure, ALL sympathetic (stellate ganglion, hypogastric, or lumbar sympathetic block) and all sedation procedures (RFA, spinal cord stimulation trials).  Not Applicable   -IVs are not routinely placed for Dr. Banuelos cervical cases   -Dr. Horowitz: IVs for cervical ESIs and cervical TBDs (not CMBBs/facet inj)      If NPO for sedation, informed patient that it is okay to take medications with sips of water (except if they are to hold blood thinners).  Not Applicable   *DO take blood pressure medication if it is prescribed*      If this is for a cervical MENA, informed patient that aspirin needs to be held for 6 days.   Not Applicable      For all patients not having spinal cord stimulator (SCS) trials or radiofrequency ablations (RFAs), informed patient:    IV sedation is not provided for this procedure.  If you feel that an oral anti-anxiety medication is needed, you can discuss this further with your referring provider or primary care provider.  The Pain Clinic provider will discuss specifics of what the procedure includes at your appointment.  Most procedures last 10-20 minutes.  We use numbing medications to help with any discomfort during the procedure.  Not Applicable      Do not schedule procedures requiring IV placement in the first appointment of the day or first appointment after lunch. Do NOT schedule at 0745, 0815 or 1245. N/A      For patients 85 or older we recommend having an adult stay w/ them for the remainder of the day.   N/A    Does the patient have any questions?  NO  Evangelina Perez  [facility]

## 2018-09-11 ENCOUNTER — RADIOLOGY INJECTION OFFICE VISIT (OUTPATIENT)
Dept: PALLIATIVE MEDICINE | Facility: CLINIC | Age: 63
End: 2018-09-11
Payer: COMMERCIAL

## 2018-09-11 ENCOUNTER — RADIANT APPOINTMENT (OUTPATIENT)
Dept: RADIOLOGY | Facility: CLINIC | Age: 63
End: 2018-09-11
Attending: PAIN MEDICINE
Payer: COMMERCIAL

## 2018-09-11 VITALS — SYSTOLIC BLOOD PRESSURE: 185 MMHG | DIASTOLIC BLOOD PRESSURE: 85 MMHG | OXYGEN SATURATION: 100 % | HEART RATE: 65 BPM

## 2018-09-11 DIAGNOSIS — M54.16 LUMBAR RADICULOPATHY: ICD-10-CM

## 2018-09-11 DIAGNOSIS — M54.16 LUMBAR RADICULOPATHY: Primary | ICD-10-CM

## 2018-09-11 PROCEDURE — 62323 NJX INTERLAMINAR LMBR/SAC: CPT | Performed by: PAIN MEDICINE

## 2018-09-11 RX ORDER — GADOBUTROL 604.72 MG/ML
5 INJECTION INTRAVENOUS ONCE
Status: COMPLETED | OUTPATIENT
Start: 2018-09-11 | End: 2018-09-11

## 2018-09-11 RX ADMIN — GADOBUTROL 1 ML: 604.72 INJECTION INTRAVENOUS at 09:00

## 2018-09-11 ASSESSMENT — PAIN SCALES - GENERAL: PAINLEVEL: MODERATE PAIN (5)

## 2018-09-11 NOTE — NURSING NOTE
Discharge Information    IV Discontiued Time:  NA    Amount of Fluid Infused:  NA    Discharge Criteria = When patient returns to baseline or as per MD order    Consciousness:  Pt is fully awake    Circulation:  BP +/- 20% of pre-procedure level. Hypertensive, asymptomatic.  Pt did not take his blood pressure meds.  He was instructed:   Go home and take your blood pressure medications.  Take your blood pressure 1 hour after taking medications.  If >200 call your primary care provider.  If you experience shortness of breath, chest pain or headache go to the emergency room.    Call the nurse line today with an update on your blood pressure:  [phone]      Respiration:  Patient is able to breathe deeply    O2 Sat:  Patient is able to maintain O2 Sat >92% on room air    Activity:  Moves 4 extremities on command    Ambulation:  Patient is able to stand and walk or stand and pivot into wheelchair    Dressing:  Clean/dry or No Dressing    Notes:   Discharge instructions and AVS given to patient    Patient meets criteria for discharge?  YES    Admitted to PCU?  No    Responsible adult present to accompany patient home?  Yes    Signature/Title:    amy richardson RN Care Coordinator  [facility] [place]

## 2018-09-11 NOTE — PROGRESS NOTES
Pre procedure Diagnosis: Lumbar radiculopathy  Post procedure Diagnosis: Same  Procedure performed: L5-S1 interlaminar epidural steroid injection   Anesthesia: No  Complications: No  Operators: Rickie Horowitz MD     Indications:   Kailash Murray is a 63 year old male.  The patient has a history of bilateral low back pain radiating to his lower extremity.  The pain is worse with bending.  Of note patient has had prior epidural injections in the past.  Last epidural was a transforaminal epidural patient reported worsening of his pain at this time.  Additionally he reports not being able to get off the table.  As a result result, will perform a interlaminar epidural steroid injection today.  Examination shows negative SLR.  he has tried conservative treatment including meds/prior injections/prior therapy.    MRI       IMPRESSION:   1. Degenerative disc disease L1-L2 and L2-L3 without direct  impingement on neural structures.  2. Degenerative disc disease L3-L4 with at least moderate right  foraminal stenosis.  3. Advanced degenerative disc disease L4-L5 with severe right and  moderate to severe left foraminal stenosis.  4. Chronic advanced degenerative disc disease and grade 1  spondylolytic spondylolisthesis at L5-S1 resulting in moderate to  severe right and severe left foraminal stenosis.     Options/alternatives, benefits and risks were discussed with the patient including but not limited to bleeding, infection, no pain relief, tissue trauma, exposure to radiation, reaction to medications, spinal cord injury, dural puncture, weakness, numbness and headache.  Questions were answered to his satisfaction and he wishes to proceed. Voluntary informed consent was obtained and signed.     Vitals were reviewed: Yes  Allergies were reviewed:  Yes   Medications were reviewed:  Yes   Pre-procedure pain score: 5/10    Procedure:  The patient's medical history, medications, and allergies were reviewed and reconciled.  After  obtaining signed informed consent, the patient was brought into the procedure suite and was placed in a prone position on the procedure table.   A Pause for the Cause was performed.  Patient was prepped and draped in the usual sterile fashion.     The L 5-S1 interspace was identified with AP fluoroscopy.  A total of 4ml of 1% lidocaine was used to anesthetize the skin and subcutaneous tissues for a  midline approach.    A 20gauge 3.5inch Touhy needle was advanced utilizing intermittent AP and Lateral fluoroscopy and air for loss of resistance.  The epidural space was encountered on the first pass without difficulties.  Aspiration for blood and CSF was negative.  Needle position was verified by injecting 1 ml of Omnipaque utilizing real-time fluoroscopy that showed good needle placement and epidural spread without signs of intravascular or intrathecal uptake.  Omnipaque wasted:  9 ml.    Then, after repeated negative aspiration for blood or CSF, a combination of Kenalog 40 mg, Bupivicaine 0.25% 2 ml, diluted with 3 ml of normal saline to a total injectate volume of 6 ml was injected into the epidural space in a slow and incremental fashion and the needle was restyletted and withdrawn.  All injected medications were preservative free.    The injection site was cleaned and a sterile dressing was applied.    The patient tolerated the procedure well without complications and was taken to the recovery room for continued observation.    Images were saved to PACS.    Post-procedure pain score: 0/10  Follow-up includes:   -f/u phone call in one week  -f/u with referring provider     Rickie Horowitz MD  [facility] [place]

## 2018-09-11 NOTE — MR AVS SNAPSHOT
After Visit Summary   9/11/2018    Kailash Murray    MRN: [id number]           Patient Information     Date Of Birth          1955        Visit Information        Provider Department      9/11/2018 8:15 AM Rickie Horowitz MD [facility]        Care Instructions    Go home and take your blood pressure medications.  Take your blood pressure 1 hour after taking medications.  If >200 call your primary care provider.  If you experience shortness of breath, chest pain or headache go to the emergency room.    Call the nurse line today with an update on your blood pressure:  [phone]      [facility]   Procedure Discharge Instructions    Today you saw:  Dr. Rickie Horowitz      You had an:  Epidural steroid injection   -lumbar    Medications used:  Lidocaine   Bupivacaine     Kenalog   Omniscan   Normal saline          Be cautious when walking. Numbness and/or weakness in the lower extremities may occur for up to 6-8 hours after the procedure due to effect of the local anesthetic    Do not drive for 6 hours. The effect of the local anesthetic could slow your reflexes.     You may resume your regular activities after 24 hours    Avoid strenuous activity for the first 24 hours    You may shower, however avoid swimming, tub baths or hot tubs for 24 hours following your procedure    You may have a mild to moderate increase in pain for several days following the injection.    It may take up to 14 days for the steroid medication to start working although you may feel the effect as early as a few days after the procedure.       You may use ice packs for 10-15 minutes, 3 to 4 times a day at the injection site for comfort    Do not use heat to painful areas for 6 to 8 hours. This will give the local anesthetic time to wear off and prevent you from accidentally burning your skin.     You may use anti-inflammatory medications (such as Ibuprofen or Aleve or Advil) or  Tylenol for pain control if necessary    If you were fasting, you may resume your normal diet and medications after the procedure    If you have diabetes, check your blood sugar more frequently than usual as your blood sugar may be higher than normal for 10-14 days following a steroid injection. Contact your doctor who manages your diabetes if your blood sugar is higher than usual    If you experience any of the following, call the Pain Clinic during work hours at [phone] or the Provider Line after hours at [phone]:  -Fever over 100 degree F  -Swelling, bleeding, redness, drainage, warmth at the injection site  -Progressive weakness or numbness in your legs or arms  -Loss of bowel or bladder function  -Unusual headache that is not relieved by Tylenol or other pain reliever  -Unusual new onset of pain that is not improving                Follow-ups after your visit        Your next 10 appointments already scheduled     Sep 13, 2018  4:00 PM CDT   Nurse Only with [facility]   [facility] [place] [facility]    [address]  [place] [address] [phone]              Who to contact     If you have questions or need follow up information about today's clinic visit or your schedule please contact [facility] directly at [phone].  Normal or non-critical lab and imaging results will be communicated to you by [employer], letter or phone within 4 business days after the clinic has received the results. If you do not hear from us within 7 days, please contact the clinic through [employer] or phone. If you have a critical or abnormal lab result, we will notify you by phone as soon as possible.  Submit refill requests through [employer] or call your pharmacy and they will forward the refill request to us. Please allow 3 business days for your refill to be completed.          Additional Information About Your Visit        [employer] Information     [employer] gives you secure  access to your electronic health record. If you see a primary care provider, you can also send messages to your care team and make appointments. If you have questions, please call your primary care clinic.  If you do not have a primary care provider, please call [phone] and they will assist you.        Care EveryWhere ID     This is your Care EveryWhere ID. This could be used by other organizations to access your [place] medical records  [id number]        Your Vitals Were     Pulse                   63            Blood Pressure from Last 3 Encounters:   09/11/18 (!) 194/92   09/04/18 186/89   08/15/18 150/70    Weight from Last 3 Encounters:   09/04/18 68.9 kg (152 lb)   08/15/18 68.9 kg (152 lb)   07/10/18 69.1 kg (152 lb 6.4 oz)              Today, you had the following     No orders found for display       Primary Care Provider Office Phone # Fax #    Jazzmine Vitale [phone]       [address] [facility] [address]        Equal Access to Services     [facility]: Hadii aad ku hadasho Soomaali, waaxda luqadaha, qaybta kaalmada adeelroy, maricruz camp . So [facility] [phone].    ATENCIÓN: Si habla español, tiene a doyle disposición servicios gratuitos de asistencia lingüística. [facility] [phone].    We comply with applicable federal civil rights laws and [place] laws. We do not discriminate on the basis of race, color, national origin, age, disability, sex, sexual orientation, or gender identity.            Thank you!     Thank you for choosing [facility]  for your care. Our goal is always to provide you with excellent care. Hearing back from our patients is one way we can continue to improve our services. Please take a few minutes to complete the written survey that you may receive in the mail after your visit with us. Thank you!             Your Updated Medication List - Protect others around you: Learn how to safely use,  store and throw away your medicines at www.disposemymeds.org.          This list is accurate as of 9/11/18  8:53 AM.  Always use your most recent med list.                   Brand Name Dispense Instructions for use Diagnosis    amLODIPine 5 MG tablet    NORVASC    90 tablet    Take 1 tablet (5 mg) by mouth daily    Benign essential hypertension       aspirin 81 MG tablet     30 tablet    Take 1 tablet (81 mg) by mouth daily    Hypertension goal BP (blood pressure) < 140/90       BETA BLOCKER NOT PRESCRIBED (INTENTIONAL)      Beta Blocker not prescribed intentionally due to EF > 40 % (ejection fraction)    Chronic diastolic heart failure (H)       losartan 100 MG tablet    COZAAR    90 tablet    Take 1 tablet (100 mg) by mouth daily    Benign essential hypertension       Magnesium Citrate 125 MG Caps           Multi-vitamin Tabs tablet      Take 1 tablet by mouth daily

## 2018-09-11 NOTE — NURSING NOTE
Pre-procedure Intake    Have you been fasting? NA    If yes, for how long? No     Are you taking a prescribed blood thinner such as coumadin, Plavix, Xarelto?    No    If yes, when did you take your last dose? No     Do you take aspirin? Yes     If cervical procedure, have you held aspirin for 6 days?  Yes     Do you have any allergies to contrast dye, iodine, steroid and/or numbing medications? Yes, Contrast     Are you currently taking antibiotics or have an active infection?  NO    Have you had a fever/elevated temperature within the past week? NO    Are you currently taking oral steroids? NO    Do you have a ? Yes       Are you pregnant or breastfeeding?  Not Applicable    Are the vital signs normal?  No    Kraig Bird MA

## 2018-09-11 NOTE — PATIENT INSTRUCTIONS
Go home and take your blood pressure medications.  Take your blood pressure 1 hour after taking medications.  If >200 call your primary care provider.  If you experience shortness of breath, chest pain or headache go to the emergency room.    Call the nurse line today with an update on your blood pressure:  [phone]      [facility]   Procedure Discharge Instructions    Today you saw:  Dr. Rickie Horowitz      You had an:  Epidural steroid injection   -lumbar    Medications used:  Lidocaine   Bupivacaine     Kenalog   Omniscan   Normal saline          Be cautious when walking. Numbness and/or weakness in the lower extremities may occur for up to 6-8 hours after the procedure due to effect of the local anesthetic    Do not drive for 6 hours. The effect of the local anesthetic could slow your reflexes.     You may resume your regular activities after 24 hours    Avoid strenuous activity for the first 24 hours    You may shower, however avoid swimming, tub baths or hot tubs for 24 hours following your procedure    You may have a mild to moderate increase in pain for several days following the injection.    It may take up to 14 days for the steroid medication to start working although you may feel the effect as early as a few days after the procedure.       You may use ice packs for 10-15 minutes, 3 to 4 times a day at the injection site for comfort    Do not use heat to painful areas for 6 to 8 hours. This will give the local anesthetic time to wear off and prevent you from accidentally burning your skin.     You may use anti-inflammatory medications (such as Ibuprofen or Aleve or Advil) or Tylenol for pain control if necessary    If you were fasting, you may resume your normal diet and medications after the procedure    If you have diabetes, check your blood sugar more frequently than usual as your blood sugar may be higher than normal for 10-14 days following a steroid injection. Contact your  doctor who manages your diabetes if your blood sugar is higher than usual    If you experience any of the following, call the Pain Clinic during work hours at [phone] or the Provider Line after hours at [phone]:  -Fever over 100 degree F  -Swelling, bleeding, redness, drainage, warmth at the injection site  -Progressive weakness or numbness in your legs or arms  -Loss of bowel or bladder function  -Unusual headache that is not relieved by Tylenol or other pain reliever  -Unusual new onset of pain that is not improving

## 2018-09-18 ENCOUNTER — TELEPHONE (OUTPATIENT)
Dept: PALLIATIVE MEDICINE | Facility: CLINIC | Age: 63
End: 2018-09-18

## 2018-09-18 NOTE — TELEPHONE ENCOUNTER
Patient is returning call, he states that he is doing much better than before but does still have pain at the end of the day. The pain is just not as drastic as it was before.      Gianna RESTREPO    [facility]

## 2018-09-18 NOTE — TELEPHONE ENCOUNTER
Patient had a  L5-S1 interlaminar epidural steroid injection  on 9/11/18.  Called patient for an update.      Left message that we were calling for an update about how he was doing after the injection.  LM that if he has any problems or questions to call the clinic at [phone].

## 2018-09-27 ENCOUNTER — TELEPHONE (OUTPATIENT)
Dept: FAMILY MEDICINE | Facility: CLINIC | Age: 63
End: 2018-09-27

## 2018-09-27 NOTE — LETTER
September 27, 2018          Kailash Murray,  [address] [place] [id number]        Dear Kailash Murray      Monitoring and managing your preventative and chronic health conditions are very important to us. Our records indicate that you have not scheduled for Appointment with your provider for a blood pressure check which was recommended by Dr. Vitale.     If you have received your health care elsewhere, please call the clinic so the information can be documented in your chart.    Please call [phone] or message us through your [employer] account to schedule an appointment or provide information for your chart.     Feel free to contact us if you have any questions or concerns!    I look forward to seeing you and working with you on your health care needs.     Sincerely,         Jazzmine Vitale P / cc

## 2018-09-27 NOTE — TELEPHONE ENCOUNTER
Panel Management Review      Patient has the following on his problem list:     Hypertension   Last three blood pressure readings:  BP Readings from Last 3 Encounters:   09/11/18 185/85   09/04/18 186/89   08/15/18 150/70     Blood pressure: FAILED    HTN Guidelines:  Age 18-59 BP range:  Less than 140/90  Age 60-85 with Diabetes:  Less than 140/90  Age 60-85 without Diabetes:  less than 150/90     COPD  Diagnosis date: 10/31/2014   Is this diagnosis new within the last year?   NO   Was spirometry completed?  YES      Composite cancer screening  Chart review shows that this patient is due/due soon for the following None  Summary:    Patient is due/failing the following:   BP CHECK    Action needed:   Patient needs office visit for Blood Pressure check.    Type of outreach:    Sent letter.    Questions for provider review:    None                                                                                                                                    WATSON Zuleta    Chart routed to none .

## 2018-10-08 ENCOUNTER — OFFICE VISIT (OUTPATIENT)
Dept: FAMILY MEDICINE | Facility: CLINIC | Age: 63
End: 2018-10-08
Payer: COMMERCIAL

## 2018-10-08 VITALS
RESPIRATION RATE: 20 BRPM | WEIGHT: 155.2 LBS | OXYGEN SATURATION: 95 % | HEART RATE: 69 BPM | SYSTOLIC BLOOD PRESSURE: 160 MMHG | BODY MASS INDEX: 23.52 KG/M2 | HEIGHT: 68 IN | DIASTOLIC BLOOD PRESSURE: 80 MMHG | TEMPERATURE: 99.1 F

## 2018-10-08 DIAGNOSIS — I10 BENIGN ESSENTIAL HYPERTENSION: ICD-10-CM

## 2018-10-08 DIAGNOSIS — L03.031 CELLULITIS OF TOE OF RIGHT FOOT: Primary | ICD-10-CM

## 2018-10-08 DIAGNOSIS — F10.20 ETOHISM (H): ICD-10-CM

## 2018-10-08 PROCEDURE — 99213 OFFICE O/P EST LOW 20 MIN: CPT | Performed by: FAMILY MEDICINE

## 2018-10-08 RX ORDER — SULFAMETHOXAZOLE/TRIMETHOPRIM 800-160 MG
1 TABLET ORAL 2 TIMES DAILY
Qty: 20 TABLET | Refills: 0 | Status: SHIPPED | OUTPATIENT
Start: 2018-10-08 | End: 2019-03-25

## 2018-10-08 RX ORDER — AMLODIPINE BESYLATE 10 MG/1
10 TABLET ORAL DAILY
Qty: 30 TABLET | Refills: 0 | Status: SHIPPED | OUTPATIENT
Start: 2018-10-08 | End: 2019-04-12

## 2018-10-08 ASSESSMENT — PAIN SCALES - GENERAL: PAINLEVEL: MILD PAIN (2)

## 2018-10-08 NOTE — MR AVS SNAPSHOT
After Visit Summary   10/8/2018    Kailash Murray    MRN: [id number]           Patient Information     Date Of Birth          1955        Visit Information        Provider Department      10/8/2018 11:20 AM Carolyne Cornejo MD [facility]        Today's Diagnoses     Cellulitis of toe of right foot    -  1    Benign essential hypertension        ETOHism (H)          Care Instructions    Did you know you can lower your blood pressure with your daily habits?    *Eating a low-fat diet rich in fruits, vegetables and low-fat dairy lowers blood pressure 8 to 14 points.  *Eating a low-salt diet lowers blood pressure 2 to 8 points.  *Exercising regularly lowers blood pressure 4 to 9 points.  *Reducing alcohol use lowers blood pressure 2 to 4 points.    [facility]    If you have any questions regarding to your visit please contact your care team:       Team Red:   Clinic Hours Telephone Number   Dr. Carolyne Lynne, NP 7am-7pm  Monday - Thursday   7am-5pm  Fridays  [phone]  (Appointment scheduling available 24/7)   Urgent Care - [place] - 11am-9pm Monday-Friday Saturday-Sunday- 9am-5pm   [place] - 5pm-9pm Monday-Friday Saturday-Sunday- 9am-5pm  [phone] - [place]  [phone] - [place]       What options do I have for a visit other than an office visit? We offer electronic visits (e-visits) and telephone visits, when medically appropriate.  Please check with your medical insurance to see if these types of visits are covered, as you will be responsible for any charges that are not paid by your insurance.      You can use [employer] (secure electronic communication) to access to your chart, send your primary care provider a message, or make an appointment. Ask a team member how to get started.     For a price quote for your services, please call our Consumer Price Line at [phone] or  "our Imaging Cost estimation line at [phone] (for imaging tests).  Sonja GOULD MA            Follow-ups after your visit        Follow-up notes from your care team     Return in about 1 week (around 10/15/2018) for blood pressure.      Who to contact     If you have questions or need follow up information about today's clinic visit or your schedule please contact [facility] directly at [phone].  Normal or non-critical lab and imaging results will be communicated to you by [employer], letter or phone within 4 business days after the clinic has received the results. If you do not hear from us within 7 days, please contact the clinic through [employer] or phone. If you have a critical or abnormal lab result, we will notify you by phone as soon as possible.  Submit refill requests through [employer] or call your pharmacy and they will forward the refill request to us. Please allow 3 business days for your refill to be completed.          Additional Information About Your Visit        [email or web address] Information     [employer] gives you secure access to your electronic health record. If you see a primary care provider, you can also send messages to your care team and make appointments. If you have questions, please call your primary care clinic.  If you do not have a primary care provider, please call [phone] and they will assist you.        Care EveryWhere ID     This is your Care EveryWhere ID. This could be used by other organizations to access your [place] medical records  [id number]        Your Vitals Were     Pulse Temperature Respirations Height Pulse Oximetry BMI (Body Mass Index)    69 99.1  F (37.3  C) (Oral) 20 5' 8\" (1.727 m) 95% 23.6 kg/m2       Blood Pressure from Last 3 Encounters:   10/08/18 160/80   09/11/18 185/85   09/04/18 186/89    Weight from Last 3 Encounters:   10/08/18 155 lb 3.2 oz (70.4 kg)   09/04/18 152 lb (68.9 kg)   08/15/18 152 lb (68.9 kg)              Today, you had the " following     No orders found for display         Today's Medication Changes          These changes are accurate as of 10/8/18 12:24 PM.  If you have any questions, ask your nurse or doctor.               Start taking these medicines.        Dose/Directions    sulfamethoxazole-trimethoprim 800-160 MG per tablet   Commonly known as:  BACTRIM DS   Used for:  Cellulitis of toe of right foot   Started by:  Carolyne Cornejo MD        Dose:  1 tablet   Take 1 tablet by mouth 2 times daily for 10 days   Quantity:  20 tablet   Refills:  0         These medicines have changed or have updated prescriptions.        Dose/Directions    amLODIPine 10 MG tablet   Commonly known as:  NORVASC   This may have changed:    - medication strength  - how much to take   Used for:  Benign essential hypertension   Changed by:  Carolyne Cornejo MD        Dose:  10 mg   Take 1 tablet (10 mg) by mouth daily   Quantity:  30 tablet   Refills:  0            Where to get your medicines      These medications were [facility] to [id number] [place] - [address]  [address] [place] [address]     Phone:  [phone]     amLODIPine 10 MG tablet    sulfamethoxazole-trimethoprim 800-160 MG per tablet                Primary Care Provider Office Phone # Fax #    Jazzmine Vitale [phone] [address]  [place] [address]        Equal Access to Services     TYLER SHEETS AH: Arslan ballo Sojuanali, waaxda luqadaha, qaybta kaalmada adeegyada, maricruz ortiz. So [facility] [phone].    ATENCIÓN: Si habla español, tiene a doyle disposición servicios gratuitos de asistencia lingüística. Llame al [phone].    We comply with applicable federal civil rights laws and [place] laws. We do not discriminate on the basis of race, color, national origin, age, disability, sex, sexual orientation, or gender identity.            Thank you!     Thank you for choosing [facility]  for your care. Our goal is always to provide you with excellent care. Hearing back from our patients is one way we can continue to improve our services. Please take a few minutes to complete the written survey that you may receive in the mail after your visit with us. Thank you!             Your Updated Medication List - Protect others around you: Learn how to safely use, store and throw away your medicines at www.disposemymeds.org.          This list is accurate as of 10/8/18 12:24 PM.  Always use your most recent med list.                   Brand Name Dispense Instructions for use Diagnosis    amLODIPine 10 MG tablet    NORVASC    30 tablet    Take 1 tablet (10 mg) by mouth daily    Benign essential hypertension       aspirin 81 MG tablet     30 tablet    Take 1 tablet (81 mg) by mouth daily    Hypertension goal BP (blood pressure) < 140/90       BETA BLOCKER NOT PRESCRIBED (INTENTIONAL)      Beta Blocker not prescribed intentionally due to EF > 40 % (ejection fraction)    Chronic diastolic heart failure (H)       losartan 100 MG tablet    COZAAR    90 tablet    Take 1 tablet (100 mg) by mouth daily    Benign essential hypertension       sulfamethoxazole-trimethoprim 800-160 MG per tablet    BACTRIM DS    20 tablet    Take 1 tablet by mouth 2 times daily for 10 days    Cellulitis of toe of right foot

## 2018-10-08 NOTE — PATIENT INSTRUCTIONS
Did you know you can lower your blood pressure with your daily habits?    *Eating a low-fat diet rich in fruits, vegetables and low-fat dairy lowers blood pressure 8 to 14 points.  *Eating a low-salt diet lowers blood pressure 2 to 8 points.  *Exercising regularly lowers blood pressure 4 to 9 points.  *Reducing alcohol use lowers blood pressure 2 to 4 points.    [facility]    If you have any questions regarding to your visit please contact your care team:       Team Red:   Clinic Hours Telephone Number   Dr. Carolyne Lynne, NP 7am-7pm  Monday - Thursday   7am-5pm  Fridays  [phone]  (Appointment scheduling available 24/7)   Urgent Care - [place] and [place] - 11am-9pm Monday-Friday Saturday-Sunday- 9am-5pm   [place] - 5pm-9pm Monday-Friday Saturday-Sunday- 9am-5pm  [phone] - [place]  [phone] - [place]       What options do I have for a visit other than an office visit? We offer electronic visits (e-visits) and telephone visits, when medically appropriate.  Please check with your medical insurance to see if these types of visits are covered, as you will be responsible for any charges that are not paid by your insurance.      You can use [employer] (secure electronic communication) to access to your chart, send your primary care provider a message, or make an appointment. Ask a team member how to get started.     For a price quote for your services, please call our Consumer Price Line at [phone] or our Imaging Cost estimation line at [phone] (for imaging tests).  Sonja GOULD MA

## 2018-10-08 NOTE — PROGRESS NOTES
"  SUBJECTIVE:   Kailash Murray is a 63 year old male alcoholic who presents to clinic today for the following health issues:    Joint Pain    Onset: x 1 month, on and off    Description:   Location: right big toe  Character: Dull ache    Intensity: mild    Progression of Symptoms: same, on and off    Accompanying Signs & Symptoms:  Other symptoms: redness and discoloration of right big toe    History:   Previous similar pain: no       Precipitating factors:   Trauma or overuse: no     Alleviating factors:  Improved by: nothing    Therapies Tried and outcome: none    ROS:  CONSTITUTIONAL: NEGATIVE for fever, chills, change in weight  INTEGUMENTARY/SKIN: see above   MUSCULOSKELETAL: NEGATIVE for significant arthralgias or myalgia  PSYCHIATRIC: NEGATIVE for changes in mood or affect    OBJECTIVE:     /80  Pulse 69  Temp 99.1  F (37.3  C) (Oral)  Resp 20  Ht 5' 8\" (1.727 m)  Wt 155 lb 3.2 oz (70.4 kg)  SpO2 95%  BMI 23.6 kg/m2  Body mass index is 23.6 kg/(m^2).  GENERAL: alert and no distress; smells of alcohol   MS: no gross musculoskeletal defects noted, no edema  SKIN: red, crusted rash on right dorsal great toe with fissured skin   PSYCH: mentation appears normal, affect normal/bright    Diagnostic Test Results:  none     ASSESSMENT/PLAN:   (L03.031) Cellulitis of toe of right foot  (primary encounter diagnosis)  Plan: sulfamethoxazole-trimethoprim (BACTRIM DS)         800-160 MG per tablet        Discussed risks and benefits of this medication. Daily wound care with vaseline and dressing. Follow up in clinic in 1 week(s) if symptoms persist or sooner for worsening.     (I10) Benign essential hypertension  Comment: uncontrolled   Plan: amLODIPine (NORVASC) 10 MG tablet        Increase dose. Follow up in clinic in 1 week(s) if symptoms persist or sooner for worsening.     (F10.20) ETOHism (H)  Plan: urged reduction in alcohol use       See Patient Instructions    Carolyne Cornejo MD  [facility] [place]

## 2019-01-14 ENCOUNTER — TELEPHONE (OUTPATIENT)
Dept: FAMILY MEDICINE | Facility: CLINIC | Age: 64
End: 2019-01-14

## 2019-01-23 ENCOUNTER — TELEPHONE (OUTPATIENT)
Dept: FAMILY MEDICINE | Facility: CLINIC | Age: 64
End: 2019-01-23

## 2019-01-23 DIAGNOSIS — M51.379 DDD (DEGENERATIVE DISC DISEASE), LUMBOSACRAL: Primary | ICD-10-CM

## 2019-01-23 NOTE — TELEPHONE ENCOUNTER
Called and spoke with patient.  Patient reports he was diagnosed with a bulging disc in back awhile ago and is continuing to have pain.  However pain is in L leg from thigh extending down to calf, heel and top of foot.  Reports it is an achy pain and same as he had before but yesterday the pain was so bad he couldn't walk.  Patient has received injections that helped but only for a short amount of time.   Also went to PT but that didn't help.   Patient wondering if he can get another referral for pain management.   Please advise.     Kiera Tipton RN

## 2019-01-23 NOTE — TELEPHONE ENCOUNTER
Please call patient: I have sent a referral request to the [facility]. In order to begin the referral process please call [phone] to confirm your information and start the referral process.   Carolyne Cornejo MD

## 2019-01-23 NOTE — TELEPHONE ENCOUNTER
Reason for call:  Other   Patient called regarding (reason for call): call back  Additional comments: Patient is calling wanting to talk to someone about pain management. Please call back    Phone number to reach patient:  Home number on file [phone] (home)    Best Time:  any    Can we leave a detailed message on this number?  YES

## 2019-01-23 NOTE — TELEPHONE ENCOUNTER
Called patient let him know referral was put in and gave him the number to call.  Corina Martinez,

## 2019-01-28 ENCOUNTER — TELEPHONE (OUTPATIENT)
Dept: FAMILY MEDICINE | Facility: CLINIC | Age: 64
End: 2019-01-28

## 2019-01-28 NOTE — TELEPHONE ENCOUNTER
Panel Management Review      Patient has the following on his problem list:     Hypertension   Last three blood pressure readings:  BP Readings from Last 3 Encounters:   10/08/18 160/80   09/11/18 185/85   09/04/18 186/89     Blood pressure: FAILED    HTN Guidelines:  Age 18-59 BP range:  Less than 140/90  Age 60-85 with Diabetes:  Less than 140/90  Age 60-85 without Diabetes:  less than 150/90      Composite cancer screening  Chart review shows that this patient is due/due soon for the following HIV screen, CBC, BMP, ADP  Summary:    Patient is due/failing the following:   BP CHECK    Action needed:   Patient needs office visit for ancillary BP Check.    Type of outreach:    Sent letter.    Questions for provider review:    None                                                                                                                                    WATSON Zuleta       Chart routed to none .

## 2019-01-28 NOTE — TELEPHONE ENCOUNTER
Panel Management Review      Patient has the following on his problem list: None      Composite cancer screening  Chart review shows that this patient is due/due soon for the following None  Summary:    Patient is due/failing the following:   HIV Screen, ADP, BMP and BP CHECK    Action needed:   Patient needs office visit for HIV Screen, ADP, BMP and BP CHECK  .    Type of outreach:    Sent letter.    Questions for provider review:    None                                                                                                                                    WATSON Zuleta       Chart routed to None .

## 2019-01-28 NOTE — LETTER
January 28, 2019          Kailash Murray,  [address] [place] [id number]        Dear Kailash Murray      Monitoring and managing your preventative and chronic health conditions are very important to us. Our records indicate that you have not scheduled for Blood Pressure Check which was recommended by Dr. Cornejo.      If you have received your health care elsewhere, please call the clinic so the information can be documented in your chart.    Please call [phone] or message us through your [employer] account to schedule an appointment or provide information for your chart.     Feel free to contact us if you have any questions or concerns!    I look forward to seeing you and working with you on your health care needs.     Sincerely,       [facility] Team/cc

## 2019-03-25 ENCOUNTER — OFFICE VISIT (OUTPATIENT)
Dept: PALLIATIVE MEDICINE | Facility: CLINIC | Age: 64
End: 2019-03-25
Payer: COMMERCIAL

## 2019-03-25 VITALS
SYSTOLIC BLOOD PRESSURE: 154 MMHG | DIASTOLIC BLOOD PRESSURE: 84 MMHG | HEART RATE: 70 BPM | WEIGHT: 141 LBS | OXYGEN SATURATION: 97 % | BODY MASS INDEX: 21.44 KG/M2

## 2019-03-25 DIAGNOSIS — M54.16 LUMBAR RADICULOPATHY: Primary | ICD-10-CM

## 2019-03-25 PROCEDURE — 99215 OFFICE O/P EST HI 40 MIN: CPT | Performed by: PAIN MEDICINE

## 2019-03-25 PROCEDURE — 99207 ZZC CONSULT E&M CHANGED TO SUBSEQUENT LEVEL: CPT | Performed by: PAIN MEDICINE

## 2019-03-25 RX ORDER — GABAPENTIN 100 MG/1
100 CAPSULE ORAL 3 TIMES DAILY
Qty: 90 CAPSULE | Refills: 1 | Status: SHIPPED | OUTPATIENT
Start: 2019-03-25 | End: 2019-09-25

## 2019-03-25 ASSESSMENT — PAIN SCALES - GENERAL: PAINLEVEL: MILD PAIN (2)

## 2019-03-25 NOTE — PATIENT INSTRUCTIONS
Further procedures recommended:    - consider repeat Lumbar epidural   - Medication Management:    - discussed would only restart one of his blood pressure medications until he has a chance to follow up with his primary care provider    - Will start vitamin B12 supplementation(1000mcg a day)- given poor diet and ETOH consumption    - will start gabapentin low dose 100mg three times a day. 100mg in the pm for 3 days--> add 100mg in the am for 3 days--> 100mg in the afternoon as tolerated. Will continue to titrate as tolerated.   - consider muscle relaxer in the future    - Consider magnesium    - Physical Therapy: consider Pain PHYSICAL THERAPY    - Diagnostic Studies: consider repeat MRI if symptoms worsen   - Follow up:   -1-2 months or sooner for procedure         ----------------------------------------------------------------  Clinic Number:  [phone]   Call this number with any questions about your care and for scheduling assistance. Calls are returned Monday through Friday between 8 AM and 4:30 PM. We usually get back to you within 2 business days depending on the issue/request.      We believe regular attendance is key to your success in our program.    Any time you are unable to keep your appointment we ask that you call us at least 24 hours in advance to let us know. This will allow us to offer the appointment time to another patient.

## 2019-03-25 NOTE — Clinical Note
Discussed at length that he should be taking his blood pressure medication as prescribed.  For now I told him to make a follow-up appointment.  I instructed him to only start one medication at a time.  Additionally I am concerned with nutritional deficiencies with this patient.  Discussed that he could use Ensure to supplement his extremely poor diet

## 2019-03-25 NOTE — PROGRESS NOTES
[facility] Consultation    Date of visit: 3/25/2019    Reason for consultation:    Primary Care Provider is Carolyne Cornejo.  Pain medications are being prescribed by n/a.    Please see the [facility] health questionnaire which the patient completed and reviewed with me in detail.    Chief Complaint:    Chief Complaint   Patient presents with     Pain     MME prescribed prior to seeing patient:  Current MME:    Pain history: Referred by PCP.  Had a L5-S1 interlaminar epidural steroid injection 9/18 with benefit.  Of note patient was seen by neurosurgery in the past who recommended L 3-S1 transforaminal lumbar interbody fusion. The pt had transforaminal at sub  imaging in the past that he could barely get off the table.  Kailash Murray is a 64 year old male who first started having problems with pain >3yrs ago. In general the pain has not changed sig. He does feel that it is maybe a little bit better than prior to the injection, but definitely not worse. The pt denies any inciting event, but his work is labor intensive. The pain starts in his post/lat thigh and radiates into his calfs. In general the pain is worse in his calfs. The pain is intermittent. The pain is more cramping/ squezzing in nature. He denies any numbness/burning. The pain is worse with walking. The pain is worse with prolonged activity.The pt notes benefit when leaning forward. Specifically he feels better when using a shopping cart. The pt repots the pain gets almost immediately better when he sits down.     He denies any incontinence. He denies any overt weakness  He denies any recent falls.  Currently smoking approx 1-2 packs a day   Drinks  +6 drinks a day   Of note cont to not take BP meds      Current treatments include:    n/a  Previous medication treatments included:  n/a    Other treatments have included:  Kailash Murray has been seen at a pain clinic in the past.  Procedures  PT: no  Chiropractic:  no  Acupuncture: no  TENs Unit: no  Injections: Transforaminal epidural injections-made his pain significantly worse  L5-S1 interlaminar benefit    Past Medical History:  Past Medical History:   Diagnosis Date     Adenomatous polyp 5-2013    needs colonsocpy every 5 years     Alcohol abuse      Asthma      Cervical radiculopathy      CHF (congestive heart failure) (H) 1-2011     COPD (chronic obstructive pulmonary disease) (H)      DDD (degenerative disc disease), cervical      DDD (degenerative disc disease), lumbar      DDD (degenerative disc disease), lumbosacral      Dupuytren's contracture 3/2/2015     Dyslipidemia      Elevated fasting glucose      HTN (hypertension)      Lumbar spinal stenosis      Moderate major depression (H) 11/9/2011     Renal insufficiency      Trigger finger, acquired 3/2/2015     Past Surgical History:  Past Surgical History:   Procedure Laterality Date     CATARACT IOL, RT/LT  8/17/2010    left     CATARACT IOL, RT/LT  8/31/2010    right     COLONOSCOPY  2013     COLONOSCOPY WITH CO2 INSUFFLATION N/A 6/8/2018    Procedure: COLONOSCOPY WITH CO2 INSUFFLATION;  V76.51 (ICD-9-CM) - Z12.11 (ICD-10-CM) - Screen for colon cancer  BMI 22.84  BCBS/, patient refused taxid# and Dr GILLESPIE#  Dr Vitale is referring doctor  [facility]- Fax# [phone];  Surgeon: Duane, William Charles, MD;  Location:  OR     SURGICAL HISTORY OF -   1991    cyst removal from back      Medications:  Current Outpatient Medications   Medication Sig Dispense Refill     BETA BLOCKER NOT PRESCRIBED, INTENTIONAL, Beta Blocker not prescribed intentionally due to EF > 40 % (ejection fraction)  0     gabapentin (NEURONTIN) 100 MG capsule Take 1 capsule (100 mg) by mouth 3 times daily 90 capsule 1     amLODIPine (NORVASC) 10 MG tablet Take 1 tablet (10 mg) by mouth daily 30 tablet 0     aspirin 81 MG tablet Take 1 tablet (81 mg) by mouth daily 30 tablet 4     losartan (COZAAR) 100 MG tablet  Take 1 tablet (100 mg) by mouth daily 90 tablet 4     Allergies:     Allergies   Allergen Reactions     Coreg [Carvedilol]      With alcohol caused him to be unresponsive     Dye [Contrast Dye]      Rash and hypotension     Hctz      syncope and dehydration with alcohol use     Paxil [Paroxetine]      SLEEPY     Penicillins Rash     Triple Antibiotic Rash     Social History:  Home situation: family  Occupation/Schooling: working  Tobacco use: yes  Alcohol use: yes-reports greater than 6 drinks a day  Drug use: no  History of chemical dependency treatment: no    Family history:  Family History   Problem Relation Age of Onset     Cancer Mother      Cerebrovascular Disease Mother      Thyroid Disease Mother      Other Cancer Mother      Heart Disease Father 50        MI      Hypertension Father      Hyperlipidemia Father      Coronary Artery Disease Father      Diabetes Sister      Heart Disease Sister         Had a heart valv replaced      Coronary Artery Disease Sister      Hypertension Sister      Anxiety Disorder Sister      Obesity Sister      Chemical Addiction Brother      Diabetes Brother          age 63, MI     Substance Abuse Brother      Unknown/Adopted Maternal Grandmother      Unknown/Adopted Maternal Grandfather      Unknown/Adopted Paternal Grandmother      Unknown/Adopted Paternal Grandfather      Anxiety Disorder Brother      Anxiety Disorder Brother      Family history of headaches: no    Review of Systems:  Skin: negative  Eyes: negative  Ears/Nose/Throat: negative  Respiratory: No shortness of breath, dyspnea on exertion, cough, or hemoptysis  Cardiovascular: negative  Gastrointestinal: negative  Genitourinary: negative  Musculoskeletal: negative  Neurologic: negative  Psychiatric: negative  Hematologic/Lymphatic/Immunologic: negative  Endocrine: negative    Physical Exam:  Vitals:    19 1407   BP: 154/84   BP Location: Right arm   Patient Position: Chair   Cuff Size: Adult Regular    Pulse: 70   SpO2: 97%   Weight: 64 kg (141 lb)     Exam:  Constitutional: healthy, alert and no distress  Head: normocephalic. Atraumatic.   Eyes: no redness or jaundice noted   ENT: oropharnx normal.  MMM.  Neck supple.    Cardiovascular: Negative JVD  Respiratory: Speaking in full sentences no accessory muscles use   gastrointestinal: soft, non-tender,   Skin: no suspicious lesions or rashes  Psychiatric: mentation appears normal and affect normal/bright    Musculoskeletal exam:  Gait/Station/Posture: wnl  Cervical spine: ROMwnl       Thoracic spine:  Normal     Lumbar spine:     ROM: mildyl decrease   Myofascial tenderness:  mild                  Straight leg exam: neg     Neurologic exam:  CN:  Cranial nerves 2-12 are normal  Motor:  5/5 UE and LE strength  Reflexes:       Achilles:  +2    Sensory:  (upper and lower extremities):   Light touch: normal    Allodynia: absent    Dysethesia: absent    Hyperalgesia: absent     Diagnostic tests:  IMPRESSION:   1. Degenerative disc disease L1-L2 and L2-L3 without direct  impingement on neural structures.  2. Degenerative disc disease L3-L4 with at least moderate right  foraminal stenosis.  3. Advanced degenerative disc disease L4-L5 with severe right and  moderate to severe left foraminal stenosis.  4. Chronic advanced degenerative disc disease and grade 1  spondylolytic spondylolisthesis at L5-S1 resulting in moderate to  severe right and severe left foraminal stenosis.                Assessment/Plan:  Kailash Murray is a 64 year old male who presents with the complaints of bilateral low back pain radiating to his calves  Kialash was seen today for pain.    Diagnoses and all orders for this visit:    Lumbar radiculopathy  -     gabapentin (NEURONTIN) 100 MG capsule; Take 1 capsule (100 mg) by mouth 3 times daily         - Further procedures recommended:    - consider repeat Lumbar epidural   - Medication Management:    - discussed would only restart one of his blood  pressure medications until he has a chance to follow up with his primary care provider    - Will start vitamin B12 supplementation(1000mcg a day)- given poor diet and ETOH consumption    - will start gabapentin low dose 100mg three times a day. 100mg in the pm for 3 days--> add 100mg in the am for 3 days--> 100mg in the afternoon as tolerated. Will continue to titrate as tolerated.   - consider muscle relaxer in the future    - Consider magnesium    - Physical Therapy: consider Pain PHYSICAL THERAPY    - Diagnostic Studies: consider repeat MRI if symptoms worsen   - Follow up:   -1-2 months or sooner for procedure                 Total time spent was 60 minutes, and more than 50% of face to face time was spent counseling and/or coordination of care regarding principles of multidisciplinary care and medication management bilateral low back pain radiating to his calf    Rickie Horowitz MD  [facility]

## 2019-03-29 ENCOUNTER — TELEPHONE (OUTPATIENT)
Dept: PALLIATIVE MEDICINE | Facility: CLINIC | Age: 64
End: 2019-03-29

## 2019-03-29 NOTE — TELEPHONE ENCOUNTER
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 Please call pt      Jose Luis RODRIGUEZ    [facility] [place]

## 2019-03-29 NOTE — TELEPHONE ENCOUNTER
Call back to Pt for more information.  No answer, LVM for Pt to call writer back.    Fredy Coto, RN  Care Coordinator   [facility] [place]

## 2019-04-02 NOTE — TELEPHONE ENCOUNTER
Pt needs a letter that says that his gabapentin is not prescribed for seizures.    Letter Pended.  Please sign.  Pt would like this sent to his house.      Fredy Coto RN  Care Coordinator   [facility] [place]

## 2019-04-02 NOTE — TELEPHONE ENCOUNTER
Letter signed and in nursing bin.    Madalyn Chan, RN-BSN  [facility]

## 2019-04-03 NOTE — TELEPHONE ENCOUNTER
Letter mailed to pt's home.    Called pt and relayed the above.    Madalyn Chan RN-BSN  [facility]

## 2019-04-12 ENCOUNTER — OFFICE VISIT (OUTPATIENT)
Dept: FAMILY MEDICINE | Facility: CLINIC | Age: 64
End: 2019-04-12
Payer: COMMERCIAL

## 2019-04-12 VITALS
TEMPERATURE: 97.3 F | HEIGHT: 69 IN | WEIGHT: 146.4 LBS | DIASTOLIC BLOOD PRESSURE: 90 MMHG | RESPIRATION RATE: 16 BRPM | BODY MASS INDEX: 21.68 KG/M2 | SYSTOLIC BLOOD PRESSURE: 184 MMHG | OXYGEN SATURATION: 97 % | HEART RATE: 88 BPM

## 2019-04-12 DIAGNOSIS — M25.511 ACUTE PAIN OF RIGHT SHOULDER: Primary | ICD-10-CM

## 2019-04-12 DIAGNOSIS — J44.9 CHRONIC OBSTRUCTIVE PULMONARY DISEASE, UNSPECIFIED COPD TYPE (H): ICD-10-CM

## 2019-04-12 DIAGNOSIS — I10 BENIGN ESSENTIAL HYPERTENSION: ICD-10-CM

## 2019-04-12 PROCEDURE — 99214 OFFICE O/P EST MOD 30 MIN: CPT | Performed by: NURSE PRACTITIONER

## 2019-04-12 RX ORDER — AMLODIPINE BESYLATE 5 MG/1
5 TABLET ORAL DAILY
Qty: 30 TABLET | Refills: 1 | Status: SHIPPED | OUTPATIENT
Start: 2019-04-12 | End: 2019-05-08

## 2019-04-12 ASSESSMENT — MIFFLIN-ST. JEOR: SCORE: 1444.45

## 2019-04-12 NOTE — PROGRESS NOTES
SUBJECTIVE:   Kailash Murray is a 64 year old male who presents to clinic today for the following   health issues:        Chief Complaint   Patient presents with     Patient Request for Note/Letter     Medication Question     Gabapentin     Patient presents for release back to work after episode of shoulder pain, which began 5 days ago. Slept on the shoulder wrong and had significant pain with shoulder flexion. Missed work for 5 days- rested, used ibuprofen and pain is totally gone now.    BP high today- no headache, chest pain, SOB, cough, or wheeze. Forgot his Cozaar today. Amlodipine was prescribed last fall but didn't take. 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      Additional history: as documented    Reviewed  and updated as needed this visit by clinical staff  Tobacco  Allergies  Meds  Med Hx  Surg Hx  Fam Hx  Soc Hx        Reviewed and updated as needed this visit by Provider         Patient Active Problem List   Diagnosis     Smoker     ETOHism (H)     Hyperlipidemia LDL goal <130     Chronic diastolic heart failure (H)     Health Care Home     Advanced directives, counseling/discussion     Trigger finger, acquired     Dupuytren's contracture     Benign essential hypertension     COPD (chronic obstructive pulmonary disease) (H)     Cervical radiculopathy     Elevated fasting glucose     DDD (degenerative disc disease), lumbosacral     Spinal stenosis of lumbar region with neurogenic claudication     Lumbar spinal stenosis     Past Surgical History:   Procedure Laterality Date     CATARACT IOL, RT/LT  8/17/2010    left     CATARACT IOL, RT/LT  8/31/2010    right     COLONOSCOPY  2013     COLONOSCOPY WITH CO2 INSUFFLATION N/A 6/8/2018    Procedure: COLONOSCOPY WITH CO2 INSUFFLATION;  V76.51 (ICD-9-CM) - Z12.11 (ICD-10-CM) - Screen for colon cancer  BMI 22.84  BCBS/, patient refused taxid# and Dr GILLESPIE#  Dr Vitale is referring doctor  [facility]-  "Fax# [phone];  Surgeon: Duane, William Charles, MD;  Location: [facility] OR     SURGICAL HISTORY OF -       cyst removal from back        Social History     Tobacco Use     Smoking status: Current Every Day Smoker     Packs/day: 1.00     Years: 40.00     Pack years: 40.00     Types: Cigarettes     Start date: 3/20/1972     Smokeless tobacco: Never Used   Substance Use Topics     Alcohol use: Yes     Alcohol/week: 8.4 oz     Types: 14 Shots of liquor per week     Comment: history of abuse      Family History   Problem Relation Age of Onset     Cancer Mother      Cerebrovascular Disease Mother      Thyroid Disease Mother      Other Cancer Mother      Heart Disease Father 50        MI      Hypertension Father      Hyperlipidemia Father      Coronary Artery Disease Father      Diabetes Sister      Heart Disease Sister         Had a heart valv replaced      Coronary Artery Disease Sister      Hypertension Sister      Anxiety Disorder Sister      Obesity Sister      Chemical Addiction Brother      Diabetes Brother          age 63, MI     Substance Abuse Brother      Unknown/Adopted Maternal Grandmother      Unknown/Adopted Maternal Grandfather      Unknown/Adopted Paternal Grandmother      Unknown/Adopted Paternal Grandfather      Anxiety Disorder Brother      Anxiety Disorder Brother            ROS:  Constitutional, HEENT, cardiovascular, pulmonary, gi and gu, MS, neuro systems are negative, except as otherwise noted.    OBJECTIVE:     /90   Pulse 88   Temp 97.3  F (36.3  C) (Oral)   Resp 16   Ht 1.753 m (5' 9\")   Wt 66.4 kg (146 lb 6.4 oz)   SpO2 97%   BMI 21.62 kg/m    Body mass index is 21.62 kg/m .  GENERAL: healthy, alert and no distress  RESP: rhonchi R upper posterior, R lower posterior, L lower posterior and mostly clears with cough  CV: regular rate and rhythm, normal S1 S2, no S3 or S4, no murmur, click or rub, no peripheral edema and peripheral pulses strong  MS: Right shoulder atraumatic, " nontender at AC joint, rotator cuff, biceps tendon. Full passive and active ROM. Negative Hawkin's. Negative empty can.  SKIN: no suspicious lesions or rashes  NEURO: Normal strength and tone, mentation intact and speech normal    Diagnostic Test Results:  Results for orders placed or performed in visit on 09/11/18   XR Lumbar Epidural Injection Incl Imaging    Narrative    This exam was marked as non-reportable because it will not be read by a   radiologist or a [place] non-radiologist provider.                 ASSESSMENT/PLAN:       1. Acute pain of right shoulder  Pain now resolved with FROM- released back to work without restrictions.    2. Benign essential hypertension  Uncontrolled today due to forgetting medication, but outside BP uncontrolled even on meds. Restart Amlodipine and follow up within 1 month to recheck.  - amLODIPine (NORVASC) 5 MG tablet; Take 1 tablet (5 mg) by mouth daily  Dispense: 30 tablet; Refill: 1    3. Chronic obstructive pulmonary disease, unspecified COPD type (H)  Abnormal exam but patient asymptomatic so will defer any med changes      See Patient Instructions    SAY Nuno CNP  [facility]

## 2019-04-12 NOTE — LETTER
April 12, 2019      Kailash Murray  [address] [place] [id number]        To Whom It May Concern:    Kailash Murray  was seen on 04/12/19.  He was seen 04/12/19 and may return to work 4/13/19 without restrictions.      Sincerely,        SAY Nuno CNP

## 2019-04-23 ENCOUNTER — MYC REFILL (OUTPATIENT)
Dept: FAMILY MEDICINE | Facility: CLINIC | Age: 64
End: 2019-04-23

## 2019-04-23 DIAGNOSIS — I10 BENIGN ESSENTIAL HYPERTENSION: ICD-10-CM

## 2019-04-23 RX ORDER — LOSARTAN POTASSIUM 100 MG/1
100 TABLET ORAL DAILY
Qty: 30 TABLET | Refills: 0 | Status: SHIPPED | OUTPATIENT
Start: 2019-04-23 | End: 2019-05-17

## 2019-04-23 NOTE — TELEPHONE ENCOUNTER
"Routing refill request to provider for review/approval because:  Labs not current:  BP  Has appointment scheduled for 5/17/2019    Requested Prescriptions   Pending Prescriptions Disp Refills     losartan (COZAAR) 100 MG tablet 30 tablet 0     Sig: Take 1 tablet (100 mg) by mouth daily       Angiotensin-II Receptors Failed - 4/23/2019 10:51 AM        Failed - Blood pressure under 140/90 in past 12 months     BP Readings from Last 3 Encounters:   04/12/19 184/90   03/25/19 154/84   10/08/18 160/80                 Failed - Recent (12 mo) or future (30 days) visit within the authorizing provider's specialty     Patient had office visit in the last 12 months or has a visit in the next 30 days with authorizing provider or within the authorizing provider's specialty.  See \"Patient Info\" tab in inbasket, or \"Choose Columns\" in Meds & Orders section of the refill encounter.              Passed - Medication is active on med list        Passed - Patient is age 18 or older        Passed - Normal serum creatinine on file in past 12 months     Recent Labs   Lab Test 05/24/18  1252   CR 0.68             Passed - Normal serum potassium on file in past 12 months     Recent Labs   Lab Test 05/24/18  1252   POTASSIUM 4.0                    "

## 2019-04-25 ENCOUNTER — TELEPHONE (OUTPATIENT)
Dept: FAMILY MEDICINE | Facility: CLINIC | Age: 64
End: 2019-04-25

## 2019-04-25 NOTE — TELEPHONE ENCOUNTER
Reason for call:  Other   Patient called regarding (reason for call): RTW letter  Additional comments: Please fax RTW letter to [phone] [facility]    Please make sure patient's name and date of birth are on the letter.  Needed by 04/30/2019    Phone number to reach patient:  Cell number on file:    Telephone Information:   Mobile [phone]       Best Time:  ASAP    Can we leave a detailed message on this number?  YES

## 2019-04-29 NOTE — PATIENT INSTRUCTIONS
Ask in the pharmacy about the Shingrix vaccine.      [facility] Advanced Care Planning is a free service available through [employer].    Please visit [email or web address] or call [phone] to learn about and register for classes.    If you need an , please call your clinic to arrange for an individual appointment.    For other questions email [email or web address] or call [phone].      Preventive Health Recommendations  Male Ages 50 - 64    Yearly exam:             See your health care provider every year in order to  o   Review health changes.   o   Discuss preventive care.    o   Review your medicines if your doctor has prescribed any.     Have a cholesterol test every 5 years, or more frequently if you are at risk for high cholesterol/heart disease.     Have a diabetes test (fasting glucose) every three years. If you are at risk for diabetes, you should have this test more often.     Have a colonoscopy at age 50, or have a yearly FIT test (stool test). These exams will check for colon cancer.      Talk with your health care provider about whether or not a prostate cancer screening test (PSA) is right for you.    You should be tested each year for STDs (sexually transmitted diseases), if you re at risk.     Shots: Get a flu shot each year. Get a tetanus shot every 10 years.     Nutrition:    Eat at least 5 servings of fruits and vegetables daily.     Eat whole-grain bread, whole-wheat pasta and brown rice instead of white grains and rice.     Get adequate Calcium and Vitamin D.     Lifestyle    Exercise for at least 150 minutes a week (30 minutes a day, 5 days a week). This will help you control your weight and prevent disease.     Limit alcohol to one drink per day.     No smoking.     Wear sunscreen to prevent skin cancer.     See your dentist every six months for an exam and cleaning.     See your eye doctor every 1 to 2 years.    Lung Cancer Screening   Frequently Asked  Questions  If you are at high-risk for lung cancer, getting screened with low-dose computed tomography (LDCT) every year can help save your life. This handout offers answers to some of the most common questions about lung cancer screening. If you have other questions, please call [phone] ([phone]).     What is it?  Lung cancer screening uses special X-ray technology to create an image of your lung tissue. The exam is quick and easy and takes less than 10 seconds. We don t give you any medicine or use any needles. You can eat before and after the exam. You don t need to change your clothes as long as the clothing on your chest doesn t contain metal. But, you do need to be able to hold your breath for at least 6 seconds during the exam.    What is the goal of lung cancer screening?  The goal of lung cancer screening is to save lives. Many times, lung cancer is not found until a person starts having physical symptoms. Lung cancer screening can help detect lung cancer in the earliest stages when it may be easier to treat.    Who should be screened for lung cancer?  We suggest lung cancer screening for anyone who is at high-risk for lung cancer. You are in the high-risk group if you:      are between the ages of 55 and 79, and    have smoked at least 1 pack of cigarettes a day for 30 or more years, and    still smoke or have quit within the past 15 years.    However, if you have a new cough or shortness of breath, you should talk to your doctor before being screened.    Some national lung health advocacy groups also recommend screening for people ages 50 to 79 who have smoked an average of 1 pack of cigarettes a day for 20 years. They must also have at least 1 other risk factor for lung cancer, not including exposure to secondhand smoke. Other risk factors are having had cancer in the past, emphysema, pulmonary fibrosis, COPD, a family history of lung cancer, or exposure to certain materials such as  arsenic, asbestos, beryllium, cadmium, chromium, diesel fumes, nickel, radon or silica. Your care team can help you know if you have one of these risk factors.     Why does it matter if I have symptoms?  Certain symptoms can be a sign that you have a condition in your lungs that should be checked and treated by your doctor. These symptoms include fever, chest pain, a new or changing cough, shortness of breath that you have never felt before, coughing up blood or unexplained weight loss. Having any of these symptoms can greatly affect the results of lung cancer screening.       Should all smokers get an LDCT lung cancer screening exam?  It depends. Lung cancer screening is for a very specific group of men and women who have a history of heavy smoking over a long period of time (see  Who should be screened for lung cancer  above).  I am in the high-risk group, but have been diagnosed with cancer in the past. Is LDCT lung cancer screening right for me?  In some cases, you should not have LDCT lung screening, such as when your doctor is already following your cancer with CT scan studies. Your doctor will help you decide if LDCT lung screening is right for you.  Do I need to have a screening exam every year?  Yes. If you are in the high-risk group described earlier, you should get an LDCT lung cancer screening exam every year until you are 79, or are no longer willing or able to undergo screening and possible procedures to diagnose and treat lung cancer.  How effective is LDCT at preventing death from lung cancer?  Studies have shown that LDCT lung cancer screening can lower the risk of death from lung cancer by 20 percent in people who are at high-risk.  What are the risks?  There are some risks and limitations of LDCT lung cancer screening. We want to make sure you understand the risks and benefits, so please let us know if you have any questions. Your doctor may want to talk with you more about these risks.     Radiation exposure: As with any exam that uses radiation, there is a very small increased risk of cancer. The amount of radiation in LDCT is small--about the same amount a person would get from a mammogram. Your doctor orders the exam when he or she feels the potential benefits outweigh the risks.    False negatives: No test is perfect, including LDCT. It is possible that you may have a medical condition, including lung cancer, that is not found during your exam. This is called a false negative result.    False positives and more testing: LDCT very often finds something in the lung that could be cancer, but in fact is not. This is called a false positive result. False positive tests often cause anxiety. To make sure these findings are not cancer, you may need to have more tests. These tests will be done only if you give us permission. Sometimes patients need a treatment that can have side effects, such as a biopsy. For more information on false positives, see  What can I expect from the results?     Findings not related to lung cancer: Your LDCT exam also takes pictures of areas of your body next to your lungs. In a very small number of cases, the CT scan will show an abnormal finding in one of these areas, such as your kidneys, adrenal glands, liver or thyroid. This finding may not be serious, but you may need more tests. Your doctor can help you decide what other tests you may need, if any.  What can I expect from the results?  About 1 out of 4 LDCT exams will find something that may need more tests. Most of the time, these findings are lung nodules. Lung nodules are very small collections of tissue in the lung. These nodules are very common, and the vast majority--more than 97 percent--are not cancer (benign). Most are normal lymph nodes or small areas of scarring from past infections.  But, if a small lung nodule is found to be cancer, the cancer can be cured more than 90 percent of the time. To know if the  nodule is cancer, we may need to get more images before your next yearly screening exam. If the nodule has suspicious features (for example, it is large, has an odd shape or grows over time), we will refer you to a specialist for further testing.  Will my doctor also get the results?  Yes. Your doctor will get a copy of your results.  Is it okay to keep smoking now that there s a cancer screening exam?  No. Tobacco is one of the strongest cancer-causing agents. It causes not only lung cancer, but other cancers and cardiovascular (heart) diseases as well. The damage caused by smoking builds over time. This means that the longer you smoke, the higher your risk of disease. While it is never too late to quit, the sooner you quit, the better.  Where can I find help to quit smoking?  The best way to prevent lung cancer is to stop smoking. If you have already quit smoking, congratulations and keep it up! For help on quitting smoking, please call [employer] at 6-305-136-ZQQJ ([phone]) or the American Cancer Society at 1-441.234.5121 to find local resources near you.  One-on-one health coaching:  If you d prefer to work individually with a health care provider on tobacco cessation, we offer:      Medication Therapy Management:  Our specially trained pharmacists work closely with you and your doctor to help you quit smoking.  Call [phone] or [phone] (toll free).     Can Do: Health coaching offered by [facility].  [email or web address]

## 2019-05-02 ENCOUNTER — TELEPHONE (OUTPATIENT)
Dept: FAMILY MEDICINE | Facility: CLINIC | Age: 64
End: 2019-05-02

## 2019-05-02 NOTE — TELEPHONE ENCOUNTER
Reason for Call:  Form, our goal is to have forms completed with 72 hours, however, some forms may require a visit or additional information.    Type of letter, form or note:  FMLA    Who is the form from?: Patient    Where did the form come from: Patient or family brought in       What clinic location was the form placed at?: [facility]    Where the form was placed: Shama Lynne's Box/Folder    What number is listed as a contact on the form?: Fax Number [phone]       Additional comments: N/A    Call taken on 5/2/2019 at 3:59 PM by Janine Reynolds

## 2019-05-03 ENCOUNTER — DOCUMENTATION ONLY (OUTPATIENT)
Dept: LAB | Facility: CLINIC | Age: 64
End: 2019-05-03

## 2019-05-03 DIAGNOSIS — I10 BENIGN ESSENTIAL HYPERTENSION: ICD-10-CM

## 2019-05-03 DIAGNOSIS — I50.32 CHRONIC DIASTOLIC HEART FAILURE (H): ICD-10-CM

## 2019-05-03 DIAGNOSIS — E78.5 HYPERLIPIDEMIA LDL GOAL <130: Primary | ICD-10-CM

## 2019-05-06 NOTE — TELEPHONE ENCOUNTER
Called and informed Kailash, the forms have been completed and that we still need the SAMEERA signed.     He will come in to sign the SAMEERA at the  and  the completed forms.    The  staff with send the SAMEERA back to the team after the patient signs.    India Peterson,

## 2019-05-06 NOTE — TELEPHONE ENCOUNTER
Completed form and the SAMEERA is down at [facility] [place] [facility]  ready for . India Peterson,     Designated pick-up person will need to provided a photo ID at time of .    Designated pick-up person Kailash Murray self.    Please send the SAMEERA back to the team. Patient can keep the envelope with paper work in it.     Thank you, India Peterson,

## 2019-05-10 DIAGNOSIS — I50.32 CHRONIC DIASTOLIC HEART FAILURE (H): ICD-10-CM

## 2019-05-10 DIAGNOSIS — I10 BENIGN ESSENTIAL HYPERTENSION: ICD-10-CM

## 2019-05-10 DIAGNOSIS — E78.5 HYPERLIPIDEMIA LDL GOAL <130: ICD-10-CM

## 2019-05-10 LAB
ALT SERPL W P-5'-P-CCNC: 21 U/L (ref 0–70)
ANION GAP SERPL CALCULATED.3IONS-SCNC: 9 MMOL/L (ref 3–14)
BUN SERPL-MCNC: 21 MG/DL (ref 7–30)
CALCIUM SERPL-MCNC: 9.4 MG/DL (ref 8.5–10.1)
CHLORIDE SERPL-SCNC: 107 MMOL/L (ref 94–109)
CHOLEST SERPL-MCNC: 216 MG/DL
CO2 SERPL-SCNC: 25 MMOL/L (ref 20–32)
CREAT SERPL-MCNC: 0.73 MG/DL (ref 0.66–1.25)
ERYTHROCYTE [DISTWIDTH] IN BLOOD BY AUTOMATED COUNT: 14.8 % (ref 10–15)
GFR SERPL CREATININE-BSD FRML MDRD: >90 ML/MIN/{1.73_M2}
GLUCOSE SERPL-MCNC: 77 MG/DL (ref 70–99)
HCT VFR BLD AUTO: 41.1 % (ref 40–53)
HDLC SERPL-MCNC: 93 MG/DL
HGB BLD-MCNC: 14 G/DL (ref 13.3–17.7)
LDLC SERPL CALC-MCNC: 110 MG/DL
MCH RBC QN AUTO: 32.6 PG (ref 26.5–33)
MCHC RBC AUTO-ENTMCNC: 34.1 G/DL (ref 31.5–36.5)
MCV RBC AUTO: 96 FL (ref 78–100)
NONHDLC SERPL-MCNC: 123 MG/DL
PLATELET # BLD AUTO: 212 10E9/L (ref 150–450)
POTASSIUM SERPL-SCNC: 4 MMOL/L (ref 3.4–5.3)
RBC # BLD AUTO: 4.29 10E12/L (ref 4.4–5.9)
SODIUM SERPL-SCNC: 141 MMOL/L (ref 133–144)
TRIGL SERPL-MCNC: 64 MG/DL
WBC # BLD AUTO: 6.6 10E9/L (ref 4–11)

## 2019-05-10 PROCEDURE — 80048 BASIC METABOLIC PNL TOTAL CA: CPT | Performed by: NURSE PRACTITIONER

## 2019-05-10 PROCEDURE — 80061 LIPID PANEL: CPT | Performed by: NURSE PRACTITIONER

## 2019-05-10 PROCEDURE — 85027 COMPLETE CBC AUTOMATED: CPT | Performed by: NURSE PRACTITIONER

## 2019-05-10 PROCEDURE — 36415 COLL VENOUS BLD VENIPUNCTURE: CPT | Performed by: NURSE PRACTITIONER

## 2019-05-10 PROCEDURE — 84460 ALANINE AMINO (ALT) (SGPT): CPT | Performed by: NURSE PRACTITIONER

## 2019-05-17 ENCOUNTER — OFFICE VISIT (OUTPATIENT)
Dept: FAMILY MEDICINE | Facility: CLINIC | Age: 64
End: 2019-05-17
Payer: COMMERCIAL

## 2019-05-17 VITALS
BODY MASS INDEX: 21.77 KG/M2 | OXYGEN SATURATION: 97 % | HEART RATE: 67 BPM | TEMPERATURE: 97.9 F | SYSTOLIC BLOOD PRESSURE: 118 MMHG | WEIGHT: 147 LBS | HEIGHT: 69 IN | DIASTOLIC BLOOD PRESSURE: 58 MMHG

## 2019-05-17 DIAGNOSIS — J34.89 RHINORRHEA: ICD-10-CM

## 2019-05-17 DIAGNOSIS — I50.32 CHRONIC DIASTOLIC HEART FAILURE (H): ICD-10-CM

## 2019-05-17 DIAGNOSIS — I10 BENIGN ESSENTIAL HYPERTENSION: ICD-10-CM

## 2019-05-17 DIAGNOSIS — Z71.89 ADVANCED DIRECTIVES, COUNSELING/DISCUSSION: ICD-10-CM

## 2019-05-17 DIAGNOSIS — Z72.0 TOBACCO ABUSE: ICD-10-CM

## 2019-05-17 DIAGNOSIS — Z00.00 ROUTINE HISTORY AND PHYSICAL EXAMINATION OF ADULT: Primary | ICD-10-CM

## 2019-05-17 DIAGNOSIS — E78.5 HYPERLIPIDEMIA LDL GOAL <70: ICD-10-CM

## 2019-05-17 LAB
FEF 25/75: NORMAL
FEV-1: NORMAL
FEV1/FVC: NORMAL
FVC: NORMAL

## 2019-05-17 PROCEDURE — 94010 BREATHING CAPACITY TEST: CPT | Performed by: NURSE PRACTITIONER

## 2019-05-17 PROCEDURE — 99396 PREV VISIT EST AGE 40-64: CPT | Performed by: NURSE PRACTITIONER

## 2019-05-17 PROCEDURE — G0296 VISIT TO DETERM LDCT ELIG: HCPCS | Performed by: NURSE PRACTITIONER

## 2019-05-17 PROCEDURE — 99213 OFFICE O/P EST LOW 20 MIN: CPT | Mod: 25 | Performed by: NURSE PRACTITIONER

## 2019-05-17 RX ORDER — ATORVASTATIN CALCIUM 40 MG/1
40 TABLET, FILM COATED ORAL DAILY
Qty: 90 TABLET | Refills: 3 | Status: SHIPPED | OUTPATIENT
Start: 2019-05-17 | End: 2021-01-01

## 2019-05-17 RX ORDER — LOSARTAN POTASSIUM 100 MG/1
100 TABLET ORAL DAILY
Qty: 90 TABLET | Refills: 3 | Status: SHIPPED | OUTPATIENT
Start: 2019-05-17 | End: 2021-03-24

## 2019-05-17 RX ORDER — AMLODIPINE BESYLATE 5 MG/1
5 TABLET ORAL DAILY
Qty: 90 TABLET | Refills: 3 | Status: SHIPPED | OUTPATIENT
Start: 2019-05-17 | End: 2019-09-10

## 2019-05-17 ASSESSMENT — ENCOUNTER SYMPTOMS
HEADACHES: 0
ABDOMINAL PAIN: 0
PARESTHESIAS: 0
JOINT SWELLING: 0
WEAKNESS: 0
HEARTBURN: 0
HEMATURIA: 0
MYALGIAS: 1
NAUSEA: 0
DIZZINESS: 0
PALPITATIONS: 0
COUGH: 0
FEVER: 0
FREQUENCY: 0
NERVOUS/ANXIOUS: 0
DIARRHEA: 0
SORE THROAT: 0
EYE PAIN: 0
CONSTIPATION: 0
CHILLS: 0
SHORTNESS OF BREATH: 0
HEMATOCHEZIA: 0
DYSURIA: 0
ARTHRALGIAS: 0

## 2019-05-17 ASSESSMENT — MIFFLIN-ST. JEOR: SCORE: 1447.17

## 2019-05-17 ASSESSMENT — PAIN SCALES - GENERAL: PAINLEVEL: SEVERE PAIN (6)

## 2019-05-17 NOTE — PROGRESS NOTES
SUBJECTIVE:   CC: Kailash Murray is an 64 year old male who presents for preventative health visit.     Healthy Habits:     Getting at least 3 servings of Calcium per day:  NO    Bi-annual eye exam:  Yes    Dental care twice a year:  NO    Sleep apnea or symptoms of sleep apnea:  None    Diet:  Regular (no restrictions)    Frequency of exercise:  None    Taking medications regularly:  Yes    Medication side effects:  None    PHQ-2 Total Score: 0    Additional concerns today:  No    The 10-year ASCVD risk score (Baltazar AMBRIZ Jr., et al., 2013) is: 12.4%    Values used to calculate the score:      Age: 64 years      Sex: Male      Is Non- : No      Diabetic: No      Tobacco smoker: Yes      Systolic Blood Pressure: 118 mmHg      Is BP treated: Yes      HDL Cholesterol: 93 mg/dL      Total Cholesterol: 216 mg/dL      Hyperlipidemia Follow-Up      Are you having any of the following symptoms? (Select all that apply)  No complaints of shortness of breath, chest pain or pressure.  No increased sweating or nausea with activity.  No left-sided neck or arm pain.  No complaints of pain in calves when walking 1-2 blocks.    Are you regularly taking any medication or supplement to lower your cholesterol?   No    Are you having muscle aches or other side effects that you think could be caused by your cholesterol lowering medication?  No      Hypertension Follow-up      Do you check your blood pressure regularly outside of the clinic? No     Are you following a low salt diet? Yes    Are your blood pressures ever more than 140 on the top number (systolic) OR more   than 90 on the bottom number (diastolic), for example 140/90? No    Today's PHQ-2 Score:   PHQ-2 ( 1999 Pfizer) 5/17/2019   Q1: Little interest or pleasure in doing things 0   Q2: Feeling down, depressed or hopeless 0   PHQ-2 Score 0   Q1: Little interest or pleasure in doing things Not at all   Q2: Feeling down, depressed or hopeless Not at all    PHQ-2 Score 0       Abuse: Current or Past(Physical, Sexual or Emotional)- No  Do you feel safe in your environment? No    Social History     Tobacco Use     Smoking status: Current Every Day Smoker     Packs/day: 1.00     Years: 40.00     Pack years: 40.00     Types: Cigarettes     Start date: 3/20/1972     Smokeless tobacco: Never Used   Substance Use Topics     Alcohol use: Yes     Alcohol/week: 8.4 oz     Types: 14 Shots of liquor per week     Comment: history of abuse      If you drink alcohol do you typically have >3 drinks per day or >7 drinks per week? Yes    Alcohol Use 5/17/2019   Prescreen: >3 drinks/day or >7 drinks/week? Yes   Prescreen: >3 drinks/day or >7 drinks/week? -   AUDIT SCORE  6     AUDIT - Alcohol Use Disorders Identification Test - Reproduced from the World Health Organization Audit 2001 (Second Edition) 5/17/2019   1.  How often do you have a drink containing alcohol? 4 or more times a week   2.  How many drinks containing alcohol do you have on a typical day when you are drinking? 1 or 2   3.  How often do you have five or more drinks on one occasion? Never   4.  How often during the last year have you found that you were not able to stop drinking once you had started? Never   5.  How often during the last year have you failed to do what was normally expected of you because of drinking? Never   6.  How often during the last year have you needed a first drink in the morning to get yourself going after a heavy drinking session? Never   7.  How often during the last year have you had a feeling of guilt or remorse after drinking? Never   8.  How often during the last year have you been unable to remember what happened the night before because of your drinking? Never   9.  Have you or someone else been injured because of your drinking? No   10. Has a relative, friend, doctor or other health care worker been concerned about your drinking or suggested you cut down? Yes, but not in the last year  "  TOTAL SCORE 6       Last PSA: No results found for: PSA    Reviewed orders with patient. Reviewed health maintenance and updated orders accordingly - Yes  Labs reviewed in EPIC    Reviewed and updated as needed this visit by clinical staff  Tobacco  Allergies  Meds         Reviewed and updated as needed this visit by Provider          Review of Systems   Constitutional: Negative for chills and fever.   HENT: Positive for congestion (nasal congestion) and rhinorrhea. Negative for ear pain, hearing loss and sore throat.    Eyes: Negative for pain and visual disturbance.   Respiratory: Negative for cough and shortness of breath.    Cardiovascular: Negative for chest pain, palpitations and peripheral edema.   Gastrointestinal: Negative for abdominal pain, constipation, diarrhea, heartburn, hematochezia and nausea.   Genitourinary: Negative for discharge, dysuria, frequency, genital sores, hematuria, impotence and urgency.   Musculoskeletal: Positive for myalgias. Negative for arthralgias and joint swelling.   Skin: Negative for rash.   Neurological: Negative for dizziness, weakness, headaches and paresthesias.   Psychiatric/Behavioral: Negative for mood changes. The patient is not nervous/anxious.          OBJECTIVE:   /58 (BP Location: Left arm, Patient Position: Chair, Cuff Size: Adult Regular)   Pulse 67   Temp 97.9  F (36.6  C) (Oral)   Ht 1.753 m (5' 9\")   Wt 66.7 kg (147 lb)   SpO2 97%   BMI 21.71 kg/m      Physical Exam  GENERAL: healthy, alert and no distress  EYES: Eyes grossly normal to inspection, PERRL and conjunctivae and sclerae normal  HENT: ear canals and TM's normal, nose and mouth without ulcers or lesions  NECK: no adenopathy, no asymmetry, masses, or scars and thyroid normal to palpation  RESP: lungs clear to auscultation - no rales, rhonchi or wheezes  CV: regular rate and rhythm, normal S1 S2, no S3 or S4, no murmur, click or rub, no peripheral edema and peripheral pulses " strong  ABDOMEN: soft, nontender, no hepatosplenomegaly, no masses and bowel sounds normal  MS: no gross musculoskeletal defects noted, no edema  SKIN: no suspicious lesions or rashes  NEURO: Normal strength and tone, mentation intact and speech normal  PSYCH: mentation appears normal, affect normal/bright    Diagnostic Test Results:  Results for orders placed or performed in visit on 05/17/19   Spirometry, Breathing Capacity   Result Value Ref Range    FEV-1      FVC      FEV1/FVC      FEF 25/75         ASSESSMENT/PLAN:   1. Routine history and physical examination of adult      2. Benign essential hypertension  Well controlled, continue current medications without change  - amLODIPine (NORVASC) 5 MG tablet; Take 1 tablet (5 mg) by mouth daily  Dispense: 90 tablet; Refill: 3  - losartan (COZAAR) 100 MG tablet; Take 1 tablet (100 mg) by mouth daily  Dispense: 90 tablet; Refill: 3    3. Hyperlipidemia LDL goal <70  Elevated ASCVD risk and strong family history- recommend statin. Return in 3-6 months for fasting lipid panel  - atorvastatin (LIPITOR) 40 MG tablet; Take 1 tablet (40 mg) by mouth daily  Dispense: 90 tablet; Refill: 3  - Lipid panel reflex to direct LDL Fasting; Future    4. Chronic diastolic heart failure (H)  Stable    5. Advanced directives, counseling/discussion      6. Rhinorrhea  Start Claritin over the counter for possible allergies. Consider COPD treatment if not better. Otherwise asymptomatic  - Spirometry, Breathing Capacity    7. Tobacco abuse    - Prof fee: Shared Decisionmaking for Lung Cancer Screening  - CT Chest Lung Cancer Scrn Low Dose wo; Future  - Okay for Smoking Cessation Study (PLUTO) to Contact Patient  - Spirometry, Breathing Capacity        COUNSELING:   Reviewed preventive health counseling, as reflected in patient instructions       Regular exercise       Healthy diet/nutrition       Immunizations    Declined: Zoster due to Cost               Colon cancer screening        "Consider lung cancer screening for ages 55-80 years and 30 pack-year smoking history         Advance Care Planning    Estimated body mass index is 21.71 kg/m  as calculated from the following:    Height as of this encounter: 1.753 m (5' 9\").    Weight as of this encounter: 66.7 kg (147 lb).          reports that he has been smoking cigarettes.  He started smoking about 47 years ago. He has a 40.00 pack-year smoking history. He has never used smokeless tobacco.  Tobacco Cessation Action Plan: Self help information given to patient    Counseling Resources:  ATP IV Guidelines  Pooled Cohorts Equation Calculator  FRAX Risk Assessment  ICSI Preventive Guidelines  Dietary Guidelines for Americans, 2010  Breathometer's MyPlate  ASA Prophylaxis  Lung CA Screening    [facility]  Lung Cancer Screening Shared Decision Making Visit     Kailash Murray is eligible for lung cancer screening on the basis of the information provided in my signed lung cancer screening order.     I have discussed with patient the risks and benefits of screening for lung cancer with low-dose CT.     The risks include:  radiation exposure: one low dose chest CT has as much ionizing radiation as about 15 chest x-rays or 6 months of background radiation living in [place]    false positives: 96% of positive findings/nodules are NOT cancer, but some might still require additional diagnostic evaluation, including biopsy  over-diagnosis: some slow growing cancers that might never have been clinically significant will be detected and treated unnecessarily     The benefit of early detection of lung cancer is contingent upon adherence to annual screening or more frequent follow up if indicated.     Furthermore, reaping the benefits of screening requires Kailash Murray to be willing and physically able to undergo diagnostic procedures, if indicated. Although no specific guide is available for determining severity of " comorbidities, it is reasonable to withhold screening in patients who have greater mortality risk from other diseases.     We did discuss that the only way to prevent lung cancer is to not smoke. Smoking cessation assistance was offered.    I did not offer risk estimation using a calculator such as this one:    ShouldIScreen

## 2019-05-17 NOTE — RESULT ENCOUNTER NOTE
Please call the patient with these results:    Kailash, your Spirometry (lung test) is consistent with mild COPD. As discussed, we do not need to start any treatment unless you develop symptoms of shortness of breath, cough, or chest congestion.    Shama Lynne, CNP

## 2019-05-20 ASSESSMENT — ENCOUNTER SYMPTOMS: RHINORRHEA: 1

## 2019-06-07 ENCOUNTER — ANCILLARY PROCEDURE (OUTPATIENT)
Dept: CT IMAGING | Facility: CLINIC | Age: 64
End: 2019-06-07
Attending: NURSE PRACTITIONER
Payer: COMMERCIAL

## 2019-06-07 DIAGNOSIS — Z72.0 TOBACCO ABUSE: ICD-10-CM

## 2019-06-07 PROCEDURE — G0297 LDCT FOR LUNG CA SCREEN: HCPCS | Mod: TC

## 2019-06-10 DIAGNOSIS — I10 BENIGN ESSENTIAL HYPERTENSION: ICD-10-CM

## 2019-06-10 RX ORDER — AMLODIPINE BESYLATE 5 MG/1
TABLET ORAL
Qty: 90 TABLET | Refills: 3 | Status: SHIPPED | OUTPATIENT
Start: 2019-06-10 | End: 2019-09-25 | Stop reason: DRUGHIGH

## 2019-06-10 NOTE — TELEPHONE ENCOUNTER
amLODIPine (NORVASC) 5 MG tablet       Last Written Prescription Date:  5/8/2019  Last Fill Quantity: 30,   # refills: 0  Last Office Visit: 5/17/2019  Future Office visit:       Routing refill request to provider for review/approval because:  Drug not active on patient's medication list

## 2019-08-20 ENCOUNTER — MYC MEDICAL ADVICE (OUTPATIENT)
Dept: FAMILY MEDICINE | Facility: CLINIC | Age: 64
End: 2019-08-20

## 2019-08-20 NOTE — TELEPHONE ENCOUNTER
"From other MyChart message:  \"Thanks for the quick answer. The pain doesn't move around, just in my left calf.\"    Kiera Shelton RN  "

## 2019-08-27 ENCOUNTER — OFFICE VISIT (OUTPATIENT)
Dept: FAMILY MEDICINE | Facility: CLINIC | Age: 64
End: 2019-08-27
Payer: COMMERCIAL

## 2019-08-27 VITALS
TEMPERATURE: 96.9 F | SYSTOLIC BLOOD PRESSURE: 132 MMHG | OXYGEN SATURATION: 97 % | BODY MASS INDEX: 21.33 KG/M2 | DIASTOLIC BLOOD PRESSURE: 82 MMHG | HEART RATE: 55 BPM | HEIGHT: 69 IN | WEIGHT: 144 LBS | RESPIRATION RATE: 16 BRPM

## 2019-08-27 DIAGNOSIS — M48.062 SPINAL STENOSIS OF LUMBAR REGION WITH NEUROGENIC CLAUDICATION: ICD-10-CM

## 2019-08-27 DIAGNOSIS — M79.662 PAIN OF LEFT LOWER LEG: Primary | ICD-10-CM

## 2019-08-27 LAB
ERYTHROCYTE [DISTWIDTH] IN BLOOD BY AUTOMATED COUNT: 14.7 % (ref 10–15)
HCT VFR BLD AUTO: 41 % (ref 40–53)
HGB BLD-MCNC: 13.7 G/DL (ref 13.3–17.7)
MCH RBC QN AUTO: 32.1 PG (ref 26.5–33)
MCHC RBC AUTO-ENTMCNC: 33.4 G/DL (ref 31.5–36.5)
MCV RBC AUTO: 96 FL (ref 78–100)
PLATELET # BLD AUTO: 179 10E9/L (ref 150–450)
RBC # BLD AUTO: 4.27 10E12/L (ref 4.4–5.9)
VIT B12 SERPL-MCNC: 740 PG/ML (ref 193–986)
WBC # BLD AUTO: 7 10E9/L (ref 4–11)

## 2019-08-27 PROCEDURE — 85027 COMPLETE CBC AUTOMATED: CPT | Performed by: FAMILY MEDICINE

## 2019-08-27 PROCEDURE — 82607 VITAMIN B-12: CPT | Performed by: FAMILY MEDICINE

## 2019-08-27 PROCEDURE — 99213 OFFICE O/P EST LOW 20 MIN: CPT | Performed by: FAMILY MEDICINE

## 2019-08-27 PROCEDURE — 36415 COLL VENOUS BLD VENIPUNCTURE: CPT | Performed by: FAMILY MEDICINE

## 2019-08-27 ASSESSMENT — MIFFLIN-ST. JEOR: SCORE: 1433.56
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
## 2019-09-05 ENCOUNTER — ANCILLARY PROCEDURE (OUTPATIENT)
Dept: ULTRASOUND IMAGING | Facility: CLINIC | Age: 64
End: 2019-09-05
Attending: FAMILY MEDICINE
Payer: COMMERCIAL

## 2019-09-05 DIAGNOSIS — M79.662 PAIN OF LEFT LOWER LEG: ICD-10-CM

## 2019-09-05 PROCEDURE — 93926 LOWER EXTREMITY STUDY: CPT | Mod: LT

## 2019-09-05 PROCEDURE — 93971 EXTREMITY STUDY: CPT | Mod: LT

## 2019-09-06 DIAGNOSIS — I73.9 CLAUDICATION (H): Primary | ICD-10-CM

## 2019-09-06 NOTE — RESULT ENCOUNTER NOTE
Please call patient:    Your result is suspicious for blocked leg artery. Call the imaging center at [phone] or  [phone] to schedule additional ultrasound testing called MORGAN.     Carolyne Cornejo MD

## 2019-09-10 ENCOUNTER — MYC MEDICAL ADVICE (OUTPATIENT)
Dept: FAMILY MEDICINE | Facility: CLINIC | Age: 64
End: 2019-09-10

## 2019-09-10 ENCOUNTER — ANCILLARY PROCEDURE (OUTPATIENT)
Dept: ULTRASOUND IMAGING | Facility: CLINIC | Age: 64
End: 2019-09-10
Attending: FAMILY MEDICINE
Payer: COMMERCIAL

## 2019-09-10 DIAGNOSIS — I73.9 CLAUDICATION (H): ICD-10-CM

## 2019-09-10 NOTE — TELEPHONE ENCOUNTER
Dr. Cornejo,  Please see Maximus message below and advise. Thanks.    Ava Cortez RN  [facility]

## 2019-09-23 NOTE — PROGRESS NOTES
"Subjective     Kailash Murray is a 64 year old male who presents to clinic today for the following health issues:    HPI   Chief Complaint   Patient presents with     Degenerative Disc Disease     Kailash Murray is a 64 year old male who presents with lumbar spinal stenosis with radiculopathy. Symptom onset of back and bilateral leg pain has been gradual, worsening for a time period of months. Severity is described as moderate. Course of his symptoms over time is worsening. He has tried 2 prior injections, the first was very helpful and the 2nd was aggravating. He does not feel he has time for physical therapy given his employment. He is no longer a , so wants to try gabapentin TID.     Reviewed and updated as needed this visit by Provider  Tobacco  Allergies  Meds  Problems  Med Hx  Surg Hx  Fam Hx  Soc Hx          Review of Systems   ROS COMP: CONSTITUTIONAL: NEGATIVE for fever, chills, change in weight  RESP: NEGATIVE for significant cough or SOB  CV: NEGATIVE for chest pain, palpitations or peripheral edema  MUSCULOSKELETAL:back pain and radicular pain    NEURO: NEGATIVE for weakness, dizziness or paresthesias      Objective    BP (!) 154/70   Pulse 69   Temp 98.8  F (37.1  C) (Oral)   Resp 20   Ht 1.753 m (5' 9\")   Wt 66.2 kg (146 lb)   SpO2 96%   BMI 21.56 kg/m    Body mass index is 21.56 kg/m .  Physical Exam   GENERAL: alert, no distress and smells of alcohol   NECK: no adenopathy, no asymmetry, masses, or scars and thyroid normal to palpation  RESP: expiratory wheezes bibasilar and decreased breath sounds throughout  CV: regular rate and rhythm, normal S1 S2, no S3 or S4, no murmur, click or rub, no peripheral edema   MS: no gross musculoskeletal defects noted, no edema  NEURO: Normal strength and tone, mentation intact and speech normal  PSYCH: mentation appears normal, affect normal/bright    Diagnostic Test Results:  Labs reviewed in Epic  Results for orders placed or performed in visit " on 09/10/19   US MORGAN Doppler No Exercise    Narrative    Exam: Bilateral lower extremity resting ankle brachial indices dated  9/10/2019 1:43 PM    Comparison study: None    Clinical history: Claudication (H)     Ordering provider: PAGE [facility]    Technique: Bilateral lower extremity resting ankle brachial indices  obtained.    Findings:    Right:      Arm: 161 mmHg   PT at ankle: 177 mmHg   DP at foot: 179 mmHg     MORGAN: 1.11    Right pulse volume recordings or VPR:      High thigh: Normal   Lower thigh: Normal   Proximal calf: Normal   Ankle: Normal    Left:     Arm: 158 mmHg   PT at ankle: 176 mmHg   DP at foot: 139 mmHg     MORGAN: 1.09    Left pulse volume recordings or VPR:     High thigh: Normal   Lower thigh: Normal   Proximal calf: Normal   Ankle: Normal      Impression    Impression:     Right leg: Resting MORGAN is 1.11, Normal.    Left leg: Resting MORGAN is 1.09, Normal.    Normal pulse volume recordings bilaterally.    Guidelines:    MORGAN Diagnostic Criteria (Based on criteria published in Circulation  2011; 124: 5286-3249):    > 1.4: Non compressible    1.00 - 1.40: Normal    0.91 - 0.99: Borderline    At or below 0.90: Abnormal    MORGAN Diagnostic Criteria (Based on ACC/AHA guideline 2008):    >/=1.3 - non compressible vessels    1.00  -1.29 - Normal    0.91 - 0.99 - Borderline    0.41 - 0.90 - Mild to moderate PAD    0.00 - 0.40 - Severe PAD    AUSTIN GARCIA MD           Assessment & Plan     (M48.062) Spinal stenosis of lumbar region with neurogenic claudication  (primary encounter diagnosis)  Plan: gabapentin (NEURONTIN) 100 MG capsule, PAIN         MANAGEMENT REFERRAL        Discussed risks and benefits of this medication. Follow-up by Chris in 2 weeks to consider dose increase. Encouraged physical therapy, which he declines. Referral for injection. Offered follow-up with pain clinic or neurosurgery.     (I10) Benign essential hypertension  Comment: uncontrolled   Plan: amLODIPine (NORVASC) 10 MG tablet         Increase dose. Follow up ancillary blood pressure recheck in one month or sooner for worsening of symptoms or side effects.      (F10.20) ETOHism (H)  (I50.32) Chronic diastolic heart failure (H)  Comment: euvolemic; no signs of readiness to quit drinking   Plan: encouraged alcohol cessation and offered my support     (J44.9) Chronic obstructive pulmonary disease, unspecified COPD type (H)  Comment: poor insight   Plan: follow-up when ready for treatment, declined today      Tobacco Cessation:   reports that he has been smoking cigarettes. He started smoking about 47 years ago. He has a 40.00 pack-year smoking history. He has never used smokeless tobacco.  Tobacco Cessation Action Plan: Information offered: Patient not interested at this time            Return in about 1 month (around 10/25/2019) for free nurse only blood pressure check.    Carolyne Cornejo MD  [facility]

## 2019-09-25 ENCOUNTER — OFFICE VISIT (OUTPATIENT)
Dept: FAMILY MEDICINE | Facility: CLINIC | Age: 64
End: 2019-09-25
Payer: COMMERCIAL

## 2019-09-25 VITALS
RESPIRATION RATE: 20 BRPM | OXYGEN SATURATION: 96 % | SYSTOLIC BLOOD PRESSURE: 154 MMHG | BODY MASS INDEX: 21.62 KG/M2 | TEMPERATURE: 98.8 F | HEART RATE: 69 BPM | WEIGHT: 146 LBS | HEIGHT: 69 IN | DIASTOLIC BLOOD PRESSURE: 70 MMHG

## 2019-09-25 DIAGNOSIS — I50.32 CHRONIC DIASTOLIC HEART FAILURE (H): ICD-10-CM

## 2019-09-25 DIAGNOSIS — M48.062 SPINAL STENOSIS OF LUMBAR REGION WITH NEUROGENIC CLAUDICATION: Primary | ICD-10-CM

## 2019-09-25 DIAGNOSIS — J44.9 CHRONIC OBSTRUCTIVE PULMONARY DISEASE, UNSPECIFIED COPD TYPE (H): ICD-10-CM

## 2019-09-25 DIAGNOSIS — I10 BENIGN ESSENTIAL HYPERTENSION: ICD-10-CM

## 2019-09-25 DIAGNOSIS — F10.20 ETOHISM (H): ICD-10-CM

## 2019-09-25 PROCEDURE — 99214 OFFICE O/P EST MOD 30 MIN: CPT | Performed by: FAMILY MEDICINE

## 2019-09-25 RX ORDER — GABAPENTIN 100 MG/1
100 CAPSULE ORAL 3 TIMES DAILY
Qty: 90 CAPSULE | Refills: 1 | Status: CANCELLED | OUTPATIENT
Start: 2019-09-25

## 2019-09-25 RX ORDER — AMLODIPINE BESYLATE 10 MG/1
10 TABLET ORAL DAILY
Qty: 30 TABLET | Refills: 0 | Status: SHIPPED | OUTPATIENT
Start: 2019-09-25 | End: 2021-03-24

## 2019-09-25 RX ORDER — GABAPENTIN 100 MG/1
100 CAPSULE ORAL 3 TIMES DAILY
Qty: 270 CAPSULE | Refills: 3 | Status: SHIPPED | OUTPATIENT
Start: 2019-09-25 | End: 2021-03-24

## 2019-09-25 RX ORDER — LANOLIN ALCOHOL/MO/W.PET/CERES
1000 CREAM (GRAM) TOPICAL EVERY MORNING
COMMUNITY
End: 2021-01-01

## 2019-09-25 ASSESSMENT — MIFFLIN-ST. JEOR: SCORE: 1442.63

## 2019-09-25 NOTE — PATIENT INSTRUCTIONS
It is recommended that you get a vaccination for shingles called Shingrix (given as 2 shots, 2 to 6 months apart), even if you have already had the Zostavax vaccine. Discuss getting the Shingix vaccine from your pharmacist, or schedule an ancillary shot visit here. Some insurances do not cover the cost of these vaccines.      Did you know you can lower your blood pressure with your daily habits?    *Losing 20 pounds of weight lowers blood pressure 5 to 20 points.  *Eating a low-fat diet rich in fruits, vegetables and low-fat dairy lowers blood pressure 8 to 14 points.  *Eating a low-salt diet lowers blood pressure 2 to 8 points.  *Exercising regularly lowers blood pressure 4 to 9 points.  *Reducing alcohol use lowers blood pressure 2 to 4 points.

## 2019-10-08 NOTE — PROGRESS NOTES
[facility] - Procedure Note    Date of Visit: 10/9/2019    Procedure performed: Lumbar 5-S1 interlaminar epidural steroid injection  Diagnosis: Lumbar spondylosis; Lumbar radiculitis/radiculopathy  : Fernie Sidhu DO   Anesthesia: none    Indications: Kailash Murray is a 64 year old male who is seen at the request of Dr. Cornejo for a lumbar epidural steroid injection. The patient describes pain in his right lateral shin that has been persistent for about 15 years. The patient has been exhibiting symptoms consistent with lumbar intraspinal inflammation and radiculopathy. Symptoms have been persistent, disabling, and intermittently severe. The patient reports minimal improvement with conservative treatment, including oral medications and PT.    He had a L5-S1 willy by Dr. Horowitz on 9/11/18 with no relief, he had a prior willy in 2003 (right L5-S1) with significant pain relief.    Lumbar MRI was done on 8/24/18 which showed   MR LUMBAR SPINE WITHOUT CONTRAST   8/24/2018 11:32 AM     HISTORY: Bilateral leg pain for 2-3 months. No specific injury.     TECHNIQUE: Sagittal T1 and T2 and STIR, axial proton density and T2  images.     COMPARISON: None.     FINDINGS: Plain films dated 8/15/2018 confirm five functional lumbar  vertebral segments. The caudal thecal sac contents appear  intrinsically normal. There is grade 1 anterior listhesis of L5 on S1  secondary to bilateral L5 pars interarticularis defects. Alignment in  the sagittal plane is otherwise normal. Benign peridiscal degenerative  signal change at multiple levels. A few scattered Schmorl's nodes are  also noted. No acute-appearing bony abnormality.     T11-T12: Early signal loss and no disc bulge/protrusion or central or  central stenosis. Foramina are not completely included but most likely  are patent.     T12-L1: Normal.     L1-L2: Signal loss, mild disc space narrowing and mild disc bulging  which is slightly more prominent  posterolaterally on the left. No disc  protrusion or central or foraminal stenosis, and the posterior facets  are normal.     L2-L3: Signal loss, mild disc space narrowing and mild diffuse disc  bulging without acute disc protrusion or central stenosis. Minimal  bilateral foraminal narrowing. Posterior facets are normal.     L3-L4: Signal loss without disc space narrowing. Mild diffuse disc  bulging with a superimposed broad-based right foraminal disc  protrusion associated with endplate spurring. This results in at least  moderate right foraminal stenosis. No central or significant left  foraminal stenosis. Minimal degenerative change right posterior facet  joint.     L4-L5: Signal loss, mild disc space narrowing and diffuse disc bulging  with no significant central stenosis. Broad-based right foraminal disc  protrusion combines with disc space narrowing and endplate spurring to  cause severe right foraminal stenosis, and clinical correlation for  right-sided L4 nerve root symptoms is recommended. Mild to moderate  left foraminal stenosis. Moderate posterior facet degenerative changes  bilaterally. Thickening of the ligamentum flavum.     L5-S1: Signal loss and advanced degenerative disc space narrowing.  Grade 1 spondylolytic spondylolisthesis with uncovering of the  posterior disc margin. No acute disc protrusion or central stenosis.  Moderate to severe right and severe left foraminal stenosis related to  the listhesis and degenerative disc disease. The remaining posterior  facets show mild degenerative change on the left.     Paraspinal soft tissues are unremarkable.                                                                      IMPRESSION:   1. Degenerative disc disease L1-L2 and L2-L3 without direct  impingement on neural structures.  2. Degenerative disc disease L3-L4 with at least moderate right  foraminal stenosis.  3. Advanced degenerative disc disease L4-L5 with severe right and  moderate to severe  left foraminal stenosis.  4. Chronic advanced degenerative disc disease and grade 1  spondylolytic spondylolisthesis at L5-S1 resulting in moderate to  severe right and severe left foraminal stenosis.     TIMMY GARCIA MD    Allergies:      Allergies   Allergen Reactions     Coreg [Carvedilol]      With alcohol caused him to be unresponsive     Dye [Contrast Dye]      Rash and hypotension     Hctz      syncope and dehydration with alcohol use     Paxil [Paroxetine]      SLEEPY     Penicillins Rash     Triple Antibiotic Rash        Vitals:  Vital Signs     Row Name  10/09/19  1450           Vital Signs   Pulse  68           Pulse/Heart Rate Source  Monitor           BP  145/72Abnormal            Oxygen Therapy   SpO2  95 %               Review of Systems: The patient denies recent fever, chills, illness, use of antibiotics or anticoagulants. All other 10-point review of systems negative.     Procedure: The procedure and risks were explained, and informed written consent was obtained from the patient. Risks include but are not limited to: infection, bleeding, increased pain, and damage to soft tissue, nerve, muscle, and vasculature structures. After getting informed consent, patient was brought into the procedure suite and was placed in a prone position on the procedure table. A Pause for the Cause was performed. Patient was prepped and draped in sterile fashion.     The L5-S1 interspace was identified with use of fluoroscopy in AP view. A 25-gauge, 1.5 inch needle was used to anesthetize the skin and subcutaneous tissue entry site with a total of 2 ml of 1% lidocaine. Under fluoroscopic visualization, a 22-gauge, 4.5 inch Tuohy epidural needle was slowly advanced towards the epidural space a few millimeters right-sided of midline. The latter part of the needle advancement was guided with fluoroscopy in the lateral view. The epidural space was identified using loss of resistance technique. After negative aspiration  for heme and cerebrospinal fluid, a total of 1 mL of non-ionic contrast was injected to confirm needle placement with 9 mL of contrast wasted. Epidurogram confirmed spread within the posterior epidural space. 1 ml of 40mg/ml of triamcinolone, 1 ml of 1% lidocaine, and 3 ml of preservative free saline was injected. The needle was removed.  Images were saved to PACS.    The patient tolerated the procedure well, and there was no evidence of procedural complications. No new sensory or motor deficits were noted following the procedure. The patient was stable and able to ambulate on discharge home. Post-procedure instructions were provided.     Pre-procedure pain score: 0/10 in the back, 5/10 in the leg  Post-procedure pain score: 0/10 in the back, 0/10 in the leg    Assessment/Plan: Kailash Murray is a 64 year old male s/p lumbar interlaminar epidural steroid injection today for lumbar spondylosis and radiculitis/radiculopathy.     1. Following today's procedure, the patient was advised to contact the [facility] for any of the following:   Fever, chills, or night sweats   New onset of pain, numbness, or weakness   Any questions/concerns regarding the procedure  If unable to contact the Pain Center, the patient was instructed to go to a local Emergency Room for any complications.   2. The patient will receive a follow-up call in 1 week.   3. Follow-up with the referring provider in 2 weeks for post-procedure evaluation.        Fenrie Sidhu DO  [facility]

## 2019-10-09 ENCOUNTER — HOSPITAL ENCOUNTER (OUTPATIENT)
Dept: GENERAL RADIOLOGY | Facility: CLINIC | Age: 64
End: 2019-10-09
Attending: PHYSICAL MEDICINE & REHABILITATION
Payer: COMMERCIAL

## 2019-10-09 ENCOUNTER — RADIOLOGY INJECTION OFFICE VISIT (OUTPATIENT)
Dept: PALLIATIVE MEDICINE | Facility: CLINIC | Age: 64
End: 2019-10-09
Payer: COMMERCIAL

## 2019-10-09 ENCOUNTER — HOSPITAL ENCOUNTER (OUTPATIENT)
Facility: CLINIC | Age: 64
Discharge: HOME | End: 2019-10-09
Attending: PHYSICAL MEDICINE & REHABILITATION | Admitting: PHYSICAL MEDICINE & REHABILITATION
Payer: COMMERCIAL

## 2019-10-09 VITALS — SYSTOLIC BLOOD PRESSURE: 156 MMHG | HEART RATE: 66 BPM | DIASTOLIC BLOOD PRESSURE: 73 MMHG | OXYGEN SATURATION: 96 %

## 2019-10-09 VITALS — SYSTOLIC BLOOD PRESSURE: 145 MMHG | HEART RATE: 68 BPM | OXYGEN SATURATION: 95 % | DIASTOLIC BLOOD PRESSURE: 72 MMHG

## 2019-10-09 DIAGNOSIS — M54.16 LUMBAR RADICULOPATHY: Primary | ICD-10-CM

## 2019-10-09 DIAGNOSIS — M54.16 LUMBAR RADICULOPATHY: ICD-10-CM

## 2019-10-09 PROCEDURE — 25000125 ZZHC RX 250: Performed by: PHYSICAL MEDICINE & REHABILITATION

## 2019-10-09 PROCEDURE — A9585 GADOBUTROL INJECTION: HCPCS | Performed by: PHYSICAL MEDICINE & REHABILITATION

## 2019-10-09 PROCEDURE — 25000128 H RX IP 250 OP 636: Performed by: PHYSICAL MEDICINE & REHABILITATION

## 2019-10-09 PROCEDURE — 62323 NJX INTERLAMINAR LMBR/SAC: CPT | Mod: TC

## 2019-10-09 PROCEDURE — 25500064 ZZH RX 255 OP 636: Performed by: PHYSICAL MEDICINE & REHABILITATION

## 2019-10-09 PROCEDURE — 40000863 ZZH STATISTIC RADIOLOGY XRAY, US, CT, MAR, NM

## 2019-10-09 RX ORDER — TRIAMCINOLONE ACETONIDE 40 MG/ML
40 INJECTION, SUSPENSION INTRA-ARTICULAR; INTRAMUSCULAR ONCE
Status: COMPLETED | OUTPATIENT
Start: 2019-10-09 | End: 2019-10-09

## 2019-10-09 RX ORDER — LIDOCAINE HYDROCHLORIDE 10 MG/ML
30 INJECTION, SOLUTION EPIDURAL; INFILTRATION; INTRACAUDAL; PERINEURAL ONCE
Status: COMPLETED | OUTPATIENT
Start: 2019-10-09 | End: 2019-10-09

## 2019-10-09 RX ORDER — GADOBUTROL 604.72 MG/ML
0.1 INJECTION INTRAVENOUS ONCE
Status: COMPLETED | OUTPATIENT
Start: 2019-10-09 | End: 2019-10-09

## 2019-10-09 RX ADMIN — LIDOCAINE HYDROCHLORIDE 5 ML: 10 INJECTION, SOLUTION EPIDURAL; INFILTRATION; INTRACAUDAL; PERINEURAL at 15:03

## 2019-10-09 RX ADMIN — GADOBUTROL 1 ML: 604.72 INJECTION INTRAVENOUS at 15:07

## 2019-10-09 RX ADMIN — TRIAMCINOLONE ACETONIDE 40 MG: 40 INJECTION, SUSPENSION INTRA-ARTICULAR; INTRAMUSCULAR at 15:08

## 2019-10-09 NOTE — DISCHARGE INSTRUCTIONS
[facility] Procedure Discharge Instructions    Today you saw:   -  Dr Fernie Sidhu    Procedure:  Epidural Steroid Injection     Medications used:  Lidocaine  -  Kenalog - Gadolinium-  Normal saline    After you go home:      You may resume your normal diet    It is recommended that a responsible adult stay with you for 6 hours if you received sedation      Care of Puncture Site:      If you have a bandaid on your puncture site, you may remove it the next morning    You may shower     No bath tubs, whirlpools or swimming for 24 hours after your procedure     Activity:      You may go back to normal activity in 24 hours    Avoid strenuous activity for the first 24 hours.    Be cautious when walking. Numbness and/or weakness in the lower extremities may occur for up to 6-8 hours after the procedure due to effect of the numbing medication used.    Do not drive for 6 hours.  The effect of the numbing medication could slow your reflexes.    Medicines:      You may resume all medications    Resume your Plavix/Clopidogrel and Aspirin in 12 hours    If you are taking a different blood thinner, follow the directions provided by the Pain Clinic RN for restarting the medication    For minor pain, you may use anti-inflammatory medications - (such as Ibuprofen, Aleve, Advil) or Acetaminophen (Tylenol) for pain control if necessary.    Pain:       You may have a mild to moderate increase in pain for several days following the injection.    It may take up to 14 days for the steroid medication to start working although you may feel the effect as early as a few days after the procedure.    You may use ice packs for 10-15 minutes, 3 to 4 times a day at the injection site for comfort.    Do not use heat to painful areas for 6 to 8 hours.  This will give the numbing medication time to wear off and prevent you from accidentally burning your skin.    Call the Pain Clinic if you experience any of the  following:      You have chills or a fever greater than 100 F     Swelling, bleeding, redness, drainage, warmth at the injection site    Progressive weakness or numbness in your legs or arms    If lumbar, call if you have a loss of bowel or bladder function    If cervical, call if you have any unusual headache that is not relieved by Tylenol or other pain medication    Unusual new onset of pain that is not improving    Other Instructions:      If you are diabetic, check your blood sugar more frequently than usual as your blood sugar may be higher than normal for 10-14 days following a steroid injection.  Contact the provider who manages your diabetes to help you control your blood sugar if needed.      If you have questions call:        Pain Clinic @ [phone] during business hours  Monday-Thursday 8 AM-5:30 PM and Friday 8 AM-4:30 PM    Provider Line @ [phone] after hours

## 2019-10-09 NOTE — PROGRESS NOTES
[facility] Post-Procedure Note    Procedure: epidural injectio  CS arrival time: 1515  Accompanied by: rad tech  Concerns/abnormal assessment after procedure: no Injection site WDL.  Plan: monitor.    [facility] Suit[facility] Discharge Nursing Note    Education/questions answered: yes AVS to pt  Patient DC location: door 1  Accompanied by: wife  CS discharge time:1542

## 2019-10-09 NOTE — BRIEF OP NOTE
[facility] Brief Operative Note    RADIOLOGY POST PROCEDURE NOTE    Patient name: Kailash Murray  MRN: [id number]  : 1955    Pre-procedure diagnosis: lumbar radiculopathy  Post-procedure diagnosis: Same    Procedure Date/Time: 2019  3:09 PM  Procedure: lumbar epidural steroid injection   Estimated blood loss: None  Specimen(s) collected with description: none    The patient tolerated the procedure well with no immediate complications.  Significant findings:none    See imaging dictation for procedural details.    Provider name: Fernie Sidhu DO  Assistant(s):None

## 2019-10-18 ENCOUNTER — TELEPHONE (OUTPATIENT)
Dept: PALLIATIVE MEDICINE | Facility: CLINIC | Age: 64
End: 2019-10-18

## 2019-10-18 NOTE — TELEPHONE ENCOUNTER
Patient had a Lumbar 5-S1 interlaminar epidural steroid  injection on 10/9/19.  Called patient for an update.      Left message that we were calling for an update about how s/he was doing after the injection.  LM that if s/he has any problems or questions to call the clinic at [phone].

## 2019-10-27 ENCOUNTER — MYC MEDICAL ADVICE (OUTPATIENT)
Dept: FAMILY MEDICINE | Facility: CLINIC | Age: 64
End: 2019-10-27

## 2019-10-30 ENCOUNTER — TELEPHONE (OUTPATIENT)
Dept: FAMILY MEDICINE | Facility: CLINIC | Age: 64
End: 2019-10-30

## 2019-10-30 NOTE — LETTER
October 30, 2019          Kailash Murray,  [address] [place] [id number]        Dear Kailash Murray      Monitoring and managing your preventative and chronic health conditions are very important to us. Our records indicate that you have not scheduled for a blood pressure check which was recommended by Dr. Cornejo.      If you have received your health care elsewhere, please call the clinic so the information can be documented in your chart.    Please call [phone] or message us through your [employer] account to schedule an appointment or provide information for your chart.     Feel free to contact us if you have any questions or concerns!    I look forward to seeing you and working with you on your health care needs.     Sincerely,       Your [facility] Team/esterb

## 2019-10-30 NOTE — TELEPHONE ENCOUNTER
Panel Management Review      Patient has the following on his problem list:     Hypertension   Last three blood pressure readings:  BP Readings from Last 3 Encounters:   10/09/19 (!) 156/73   10/09/19 (!) 145/72   09/25/19 (!) 154/70     Blood pressure: FAILED    HTN Guidelines:  Less than 140/90      Composite cancer screening  Chart review shows that this patient is due/due soon for the following None  Summary:    Patient is due/failing the following:   BP CHECK    Action needed:   Patient needs office visit for above.    Type of outreach:    Sent letter.    Questions for provider review:    None                                                                                                                                    Emily Bauman MA

## 2019-12-16 ENCOUNTER — OFFICE VISIT (OUTPATIENT)
Dept: FAMILY MEDICINE | Facility: CLINIC | Age: 64
End: 2019-12-16
Payer: COMMERCIAL

## 2019-12-16 VITALS
SYSTOLIC BLOOD PRESSURE: 134 MMHG | BODY MASS INDEX: 22.27 KG/M2 | WEIGHT: 150.8 LBS | HEART RATE: 64 BPM | DIASTOLIC BLOOD PRESSURE: 70 MMHG | TEMPERATURE: 96.6 F | RESPIRATION RATE: 18 BRPM | OXYGEN SATURATION: 98 %

## 2019-12-16 DIAGNOSIS — F10.20 ETOHISM (H): ICD-10-CM

## 2019-12-16 DIAGNOSIS — Z48.02 ENCOUNTER FOR STAPLE REMOVAL: Primary | ICD-10-CM

## 2019-12-16 PROCEDURE — 99212 OFFICE O/P EST SF 10 MIN: CPT | Performed by: FAMILY MEDICINE

## 2019-12-16 NOTE — PATIENT INSTRUCTIONS
Patient Education     Staple Removal (No Complication)  You were seen today for a suture removal. Your wound is healing as expected. It has healed well enough that the sutures or staples were ready to be removed. The wound will continue to heal below the surface of the skin for a few months. It is unlikely that you will have any further problem.  At this time there is no sign of a wound infection. Signs of a wound infection include:    Increasing redness or swelling around the wound    Increased warmth of the wound    Worsening pain    Red streaking lines away from the wound    Draining pus  Home care    Keep the wound clean and dry. Use an adhesive strip, if needed, to keep the wound from getting dirty for the next week.    Wash the wound carefully with soap and water daily during the next week.    You may shower and bathe as usual.    The wound may separate, or split open. If this happens, keep it clean and covered until you follow up or return for a recheck.    When exposed to the sun, scars can take on red or brown discoloration. To decrease scar color, protect the area from sun exposure. Use sun block for 6 to 12 months.  Follow-up care  Follow up with your healthcare provider, or as advised.  When to seek medical advice  Contact your healthcare provider right away if any of the following occur:    Increasing pain in the wound    Redness, swelling, or pus coming from the wound    Fever of 100.4 F (38 C) or higher, or as directed by your healthcare provider    Any questions about how the wound is healing  Date Last Reviewed: 4/1/2018 2000-2018 [email or web address]. [address] [place] [address], [place] [address]. All rights reserved. This information is not intended as a substitute for professional medical care. Always follow your healthcare professional's instructions.

## 2019-12-16 NOTE — PROGRESS NOTES
Subjective     Kailash Murray is a 64 year old male who presents to clinic today for the following health issues:    HPI   ED/UC Followup:    Facility:  [facility]  Date of visit: 12/05/2019  Reason for visit: Laceration on head  Current Status: Feels better since visit     Patient presents for staple removal  He fell while drinking alcohol  3 staples, right forehead, placed last week    Patient doesn't want help quitting alcohol at this time.  He states his wife has been helping him.    BP Readings from Last 3 Encounters:   12/16/19 134/70   10/09/19 (!) 156/73   10/09/19 (!) 145/72    Wt Readings from Last 3 Encounters:   12/16/19 68.4 kg (150 lb 12.8 oz)   09/25/19 66.2 kg (146 lb)   08/27/19 65.3 kg (144 lb)                      Reviewed and updated as needed this visit by Provider  Tobacco  Allergies  Meds  Problems  Med Hx  Surg Hx  Fam Hx         Review of Systems   ROS COMP: Constitutional, HEENT, cardiovascular, pulmonary, gi and gu systems are negative, except as otherwise noted.      Objective    /70   Pulse 64   Temp 96.6  F (35.9  C) (Oral)   Resp 18   Wt 68.4 kg (150 lb 12.8 oz)   SpO2 98%   BMI 22.27 kg/m    Body mass index is 22.27 kg/m .  Physical Exam   GENERAL: healthy, alert and no distress  EYES: Eyes grossly normal to inspection, PERRL and conjunctivae and sclerae normal  NECK: no adenopathy, no asymmetry, masses, or scars and thyroid normal to palpation  SKIN: 3cm well healed laceration right forehead, 3 staples in place  PSYCH: mentation appears normal, affect normal/bright          Assessment & Plan       ICD-10-CM    1. Encounter for staple removal Z48.02 SUTURE REMOVAL TRAY   2. ETOHism (H) F10.20      Staples removed without complication  Discussed alcoholism and patient doesn't want assistance at this time       Follow-up in 1 month  Darryn Rodriguez MD  [facility]

## 2020-02-24 ENCOUNTER — HEALTH MAINTENANCE LETTER (OUTPATIENT)
Age: 65
End: 2020-02-24

## 2020-03-25 ENCOUNTER — MYC MEDICAL ADVICE (OUTPATIENT)
Dept: FAMILY MEDICINE | Facility: CLINIC | Age: 65
End: 2020-03-25

## 2020-03-31 ENCOUNTER — MYC MEDICAL ADVICE (OUTPATIENT)
Dept: FAMILY MEDICINE | Facility: CLINIC | Age: 65
End: 2020-03-31

## 2020-03-31 DIAGNOSIS — F10.20 UNCOMPLICATED ALCOHOL DEPENDENCE (H): ICD-10-CM

## 2020-04-10 ENCOUNTER — MYC MEDICAL ADVICE (OUTPATIENT)
Dept: FAMILY MEDICINE | Facility: CLINIC | Age: 65
End: 2020-04-10

## 2020-04-10 ENCOUNTER — E-VISIT (OUTPATIENT)
Dept: FAMILY MEDICINE | Facility: CLINIC | Age: 65
End: 2020-04-10
Payer: OTHER GOVERNMENT

## 2020-04-10 DIAGNOSIS — F33.1 MAJOR DEPRESSIVE DISORDER, RECURRENT EPISODE, MODERATE (H): Primary | ICD-10-CM

## 2020-04-10 PROCEDURE — 99421 OL DIG E/M SVC 5-10 MIN: CPT | Performed by: FAMILY MEDICINE

## 2020-04-10 ASSESSMENT — PATIENT HEALTH QUESTIONNAIRE - PHQ9
SUM OF ALL RESPONSES TO PHQ QUESTIONS 1-9: 16
SUM OF ALL RESPONSES TO PHQ QUESTIONS 1-9: 16
10. IF YOU CHECKED OFF ANY PROBLEMS, HOW DIFFICULT HAVE THESE PROBLEMS MADE IT FOR YOU TO DO YOUR WORK, TAKE CARE OF THINGS AT HOME, OR GET ALONG WITH OTHER PEOPLE: VERY DIFFICULT

## 2020-04-10 ASSESSMENT — ANXIETY QUESTIONNAIRES
3. WORRYING TOO MUCH ABOUT DIFFERENT THINGS: SEVERAL DAYS
6. BECOMING EASILY ANNOYED OR IRRITABLE: MORE THAN HALF THE DAYS
1. FEELING NERVOUS, ANXIOUS, OR ON EDGE: SEVERAL DAYS
GAD7 TOTAL SCORE: 8
4. TROUBLE RELAXING: SEVERAL DAYS
2. NOT BEING ABLE TO STOP OR CONTROL WORRYING: SEVERAL DAYS
7. FEELING AFRAID AS IF SOMETHING AWFUL MIGHT HAPPEN: SEVERAL DAYS
GAD7 TOTAL SCORE: 8
5. BEING SO RESTLESS THAT IT IS HARD TO SIT STILL: SEVERAL DAYS
7. FEELING AFRAID AS IF SOMETHING AWFUL MIGHT HAPPEN: SEVERAL DAYS
GAD7 TOTAL SCORE: 8

## 2020-04-10 NOTE — LETTER
My Depression Action Plan  Name: Kailash Murray   Date of Birth 1955  Date: 4/12/2020    My doctor: Carolyne Cornejo   My clinic: [address]  [place] [address]  [phone]          GREEN    ZONE   Good Control    What it looks like:     Things are going generally well. You have normal ups and downs. You may even feel depressed from time to time, but bad moods usually last less than a day.   What you need to do:  1. Continue to care for yourself (see self care plan)  2. Check your depression survival kit and update it as needed  3. Follow your physician s recommendations including any medication.  4. Do not stop taking medication unless you consult with your physician first.           YELLOW         ZONE Getting Worse    What it looks like:     Depression is starting to interfere with your life.     It may be hard to get out of bed; you may be starting to isolate yourself from others.    Symptoms of depression are starting to last most all day and this has happened for several days.     You may have suicidal thoughts but they are not constant.   What you need to do:     1. Call your care team. Your response to treatment will improve if you keep your care team informed of your progress. Yellow periods are signs an adjustment may need to be made.     2. Continue your self-care.  Just get dressed and ready for the day.  Don't give yourself time to talk yourself out of it.    3. Talk to someone in your support network.    4. Open up your Depression Self-Care Plan/Wellness Kit.           RED    ZONE Medical Alert - Get Help    What it looks like:     Depression is seriously interfering with your life.     You may experience these or other symptoms: You can t get out of bed most days, can t work or engage in other necessary activities, you have trouble taking care of basic hygiene, or basic responsibilities, thoughts of suicide or death that will not go  away, self-injurious behavior.     What you need to do:  1. Call your care team and request a same-day appointment. If they are not available (weekends or after hours) call your local crisis line, emergency room or 911.            Depression Self-Care Plan / Wellness Kit    Self-Care for Depression  Here s the deal. Your body and mind are really not as separate as most people think.  What you do and think affects how you feel and how you feel influences what you do and think. This means if you do things that people who feel good do, it will help you feel better.  Sometimes this is all it takes.  There is also a place for medication and therapy depending on how severe your depression is, so be sure to consult with your medical provider and/ or Behavioral Health Consultant if your symptoms are worsening or not improving.     In order to better manage my stress, I will:    Exercise  Get some form of exercise, every day. This will help reduce pain and release endorphins, the  feel good  chemicals in your brain. This is almost as good as taking antidepressants!  This is not the same as joining a gym and then never going! (they count on that by the way ) It can be as simple as just going for a walk or doing some gardening, anything that will get you moving.      Hygiene   Maintain good hygiene (get out of bed in the morning, make your bed, brush your teeth, take a shower, and get dressed like you were going to work, even if you are unemployed).  If your clothes don't fit try to get ones that do.    Diet  Strive to eat foods that are good for me, drink plenty of water, and avoid excessive sugar, caffeine, alcohol, and other mood-altering substances.  Some foods that are helpful in depression are: complex carbohydrates, B vitamins, flaxseed, fish or fish oil, fresh fruits and vegetables.    Psychotherapy  Agree to participate in Individual Therapy (if recommended).    Medication  If prescribed medications, I agree to take  them.  Missing doses can result in serious side effects.  I understand that drinking alcohol, or other illicit drug use, may cause potential side effects.  I will not stop my medication abruptly without first discussing it with my provider.    Staying Connected With Others  Stay in touch with my friends, family members, and my primary care provider/team.    Use your imagination  Be creative.  We all have a creative side; it doesn t matter if it s oil painting, sand castles, or mud pies! This will also kick up the endorphins.    Witness Beauty  (AKA stop and smell the roses) Take a look outside, even in mid-winter. Notice colors, textures. Watch the squirrels and birds.     Service to others  Be of service to others.  There is always someone else in need.  By helping others we can  get out of ourselves  and remember the really important things.  This also provides opportunities for practicing all the other parts of the program.    Humor  Laugh and be silly!  Adjust your TV habits for less news and crime-drama and more comedy.    Control your stress  Try breathing deep, massage therapy, biofeedback, and meditation. Find time to relax each day.     Crisis Text Line  http://www.crisistextline.org    The Crisis Text Line serves anyone, in any type of crisis, providing access to free, 24/7 support and information via the medium people already use and trust:    Here's how it works:  1.  Text 601-090 from anywhere in the [place], anytime, about any type of crisis.  2.  A live, trained Crisis Counselor receives the text and responds quickly.  3.  The volunteer Crisis Counselor will help you move from a 'hot moment to a cool moment'.    My support system    Clinic Contact:  Phone number:    Contact 1:  Phone number:    Contact 2:  Phone number:    Buddhism/:  Phone number:    Therapist:  Phone number:    Local crisis center:    Phone number:    Other community support:  Phone number:

## 2020-04-11 ASSESSMENT — ANXIETY QUESTIONNAIRES: GAD7 TOTAL SCORE: 8

## 2020-04-11 ASSESSMENT — PATIENT HEALTH QUESTIONNAIRE - PHQ9: SUM OF ALL RESPONSES TO PHQ QUESTIONS 1-9: 16

## 2020-04-13 NOTE — PATIENT INSTRUCTIONS
Thank you for choosing us for your care. I have placed an order for a prescription so that you can start treatment. View your full visit summary for details by clicking on the link below. Your pharmacist will able to address any questions you may have about the medication.      If you're not feeling better within 4-6 weeks please schedule an appointment.  You can schedule an appointment right here in [facility], or call [phone]  If the visit is for the same symptoms as your e-visit, we'll refund the cost of your e-visit if seen within seven days.      Depression: Tips to Help Yourself    As your healthcare providers help treat your depression, you can also help yourself. Keep in mind that your illness affects you emotionally, physically, mentally, and socially. So full recovery will take time. Take care of your body and your soul, and be patient with yourself as you get better.  Self-care    Educate yourself. Read about treatment and medicine options. If you have the energy, attend local conferences or support groups. Keep a list of useful websites and helpful books and use them as needed. This illness is not your fault. Don t blame yourself for your depression.    Manage early symptoms. If you notice symptoms returning, experience triggers, or identify other factors that may lead to a depressive episode, get help as soon as possible. Ask trusted friends and family to monitor your behavior and let you know if they see anything of concern.    Work with your provider. Find a provider you can trust. Communicate honestly with that person and share information on your treatment for depression and your reaction to medicines.    Be prepared for a crisis. Know what to do if you experience a crisis. Keep the phone number of a crisis hotline and know the location of your community's urgent care centers and the closest emergency department.    Hold off on big decisions. Depression can cloud your judgment. So wait until you  feel better before making major life decisions, such as changing jobs, moving, or getting  or .    Be patient. Recovering from depression is a process. Don t be discouraged if it takes some time to feel better.    Keep it simple. Depression saps your energy and concentration. So you won t be able to do all the things you used to do. Set small goals and do what you can.    Be with others. Don t isolate yourself--you ll only feel worse. Try to be with other people. And take part in fun activities when you can. Go to a movie, [employer], [employer] service, or social event. Talk openly with people you can trust. And accept help when it s offered.  Take care of your body  People with depression often lose the desire to take care of themselves. That only makes their problems worse. During treatment and afterward, make a point to:    Exercise. It s a great way to take care of your body. And studies have shown that exercise helps fight depression.    Avoid drugs and alcohol. These may ease the pain in the short term. But they ll only make your problems worse in the long run.    Get relief from stress. Ask your healthcare provider for relaxation exercises and techniques to help relieve stress.    Eat right. A balanced and healthy diet helps keep your body healthy.  Date Last Reviewed: 1/1/2017 2000-2019 [employer]. [address] [place] [address], [place] [address]. All rights reserved. This information is not intended as a substitute for professional medical care. Always follow your healthcare professional's instructions.

## 2020-08-05 ENCOUNTER — PATIENT OUTREACH (OUTPATIENT)
Dept: CARE COORDINATION | Facility: CLINIC | Age: 65
End: 2020-08-05

## 2020-08-05 DIAGNOSIS — F10.920 ALCOHOLIC INTOXICATION WITHOUT COMPLICATION (H): Primary | ICD-10-CM

## 2020-08-05 ASSESSMENT — ACTIVITIES OF DAILY LIVING (ADL): DEPENDENT_IADLS:: INDEPENDENT

## 2020-08-05 NOTE — PROGRESS NOTES
Clinic Care Coordination Contact  [facility]/Voicemail    Referral Source: ED Follow-Up  Clinical Data: Care Coordinator Outreach. Patient at [facility] ED 7/26, 7/27, 7/29, 7/31, and 8/4/2020 for alcohol intoxication.  On 7/29, there was associated shortness of breath, on 7/31, he had reported he wanted detox while in ED, had reported having fallen down days before. On most recent ED visit, he was brought by EMS after being found lying on garage floor. Patient has reported in previous ED visit that he cannot recall last time he was sober. Previous chart notes indicate Patient has attended detox in the past. His spouse appears to be involved, no consent on file    Outreach attempted x 1.  Left message on patient's voicemail with call back information and requested return call.  Plan: Care Coordinator will try to reach patient again in 1-2 business days.    Angelique Anderson, ASTERW, MSW   [facility]  Care Coordination  [facility], [place], [place], [place], [facility]  [phone]  8/5/2020 9:11 AM

## 2020-08-05 NOTE — LETTER
[facility] CARE COORDINATION  [facility] [address] [place] [address]  August 6, 2020    Kailash Murray  [address] [place] [address]  [place] [address]      Dear Kailash,    I am a clinic care coordinator who works with Carolyne Cornejo MD at [facility]. Below is a description of clinic care coordination and how I can further assist you.      The clinic care coordination team is made up of a registered nurse,  and community health worker who understand the health care system. The goal of clinic care coordination is to help you manage your health and improve access to the health care system in the most efficient manner. The team can assist you in meeting your health care goals by providing education, coordinating services, strengthening the communication among your providers and supporting you with any resource needs.    Please feel free to contact me at [phone] with any questions or concerns. We are focused on providing you with the highest-quality healthcare experience possible and that all starts with you.     Sincerely,     Angelique Anderson, MALIA, MSW Clinic   [facility]  Care Coordination  [facility] Center[facility]ossy arnold [place], [email or web address]  Office: [phone]  Employed by [facility]

## 2020-08-06 ASSESSMENT — ACTIVITIES OF DAILY LIVING (ADL): DEPENDENT_IADLS:: INDEPENDENT

## 2020-08-06 NOTE — PROGRESS NOTES
Clinic Care Coordination Contact  [facility]/Voicemail    Referral Source: ED Follow-Up  Clinical Data: Care Coordinator Outreach  Outreach attempted x 2.  Left message on patient's voicemail with call back information and requested return call.  Plan: Care Coordinator sent care coordination introduction letter today via [employer]. Care Coordinator will try to reach patient again in 3-5 business days.    MALIA Lemus, MSW   [facility]  Care Coordination  [facility], [place], [place], [place], [facility]  [phone]  8/6/2020 9:48 AM

## 2020-08-12 ENCOUNTER — PATIENT OUTREACH (OUTPATIENT)
Dept: CARE COORDINATION | Facility: CLINIC | Age: 65
End: 2020-08-12

## 2020-08-12 ASSESSMENT — ACTIVITIES OF DAILY LIVING (ADL): DEPENDENT_IADLS:: INDEPENDENT

## 2020-08-13 NOTE — PROGRESS NOTES
Clinic Care Coordination Contact  [facility]/Voicemail    Referral Source: ED Follow-Up  Clinical Data: Care Coordinator Outreach  Outreach attempted multiple times.  VM not accepting messages    Plan: Care Coordinator will do no further outreaches at this time.    MALIA Lemus, MSW   [facility]  Care Coordination  [facility], [facility]  [phone]  8/13/2020 8:54 AM

## 2020-08-31 ENCOUNTER — PATIENT OUTREACH (OUTPATIENT)
Dept: CARE COORDINATION | Facility: CLINIC | Age: 65
End: 2020-08-31

## 2020-08-31 DIAGNOSIS — F10.920 ALCOHOLIC INTOXICATION WITHOUT COMPLICATION (H): Primary | ICD-10-CM

## 2020-08-31 NOTE — PROGRESS NOTES
Clinic Care Coordination Contact    Situation: Patient chart reviewed by care coordinator.    Background: pt has had Multiple ED visits related to alcohol.  6 this month alone.     Assessment: pt has had 4 attempts this month to reach him along with a letter with no success.     Plan/Recommendations: will not do any outreach at this time due to past attempts/fails.     MALIA Omalley, [facility] - Care Coordinator   [facility]  8/31/2020   3:07 PM  [phone]

## 2020-12-13 ENCOUNTER — HEALTH MAINTENANCE LETTER (OUTPATIENT)
Age: 65
End: 2020-12-13

## 2021-01-01 ENCOUNTER — PREP FOR PROCEDURE (OUTPATIENT)
Dept: SURGERY | Facility: CLINIC | Age: 66
End: 2021-01-01

## 2021-01-01 ENCOUNTER — HOSPITAL ENCOUNTER (OUTPATIENT)
Dept: CARDIOLOGY | Facility: CLINIC | Age: 66
Discharge: HOME | DRG: 165 | End: 2021-06-14
Attending: PHYSICIAN ASSISTANT | Admitting: PHYSICIAN ASSISTANT
Payer: COMMERCIAL

## 2021-01-01 ENCOUNTER — HEALTH MAINTENANCE LETTER (OUTPATIENT)
Age: 66
End: 2021-01-01

## 2021-01-01 ENCOUNTER — TELEPHONE (OUTPATIENT)
Dept: SURGERY | Facility: CLINIC | Age: 66
End: 2021-01-01

## 2021-01-01 ENCOUNTER — ANESTHESIA (OUTPATIENT)
Dept: SURGERY | Facility: CLINIC | Age: 66
DRG: 165 | End: 2021-01-01
Payer: COMMERCIAL

## 2021-01-01 ENCOUNTER — TELEPHONE (OUTPATIENT)
Dept: FAMILY MEDICINE | Facility: CLINIC | Age: 66
End: 2021-01-01

## 2021-01-01 ENCOUNTER — APPOINTMENT (OUTPATIENT)
Dept: GENERAL RADIOLOGY | Facility: CLINIC | Age: 66
DRG: 165 | End: 2021-01-01
Attending: PHYSICIAN ASSISTANT
Payer: COMMERCIAL

## 2021-01-01 ENCOUNTER — TELEPHONE (OUTPATIENT)
Dept: RESPIRATORY THERAPY | Facility: CLINIC | Age: 66
End: 2021-01-01

## 2021-01-01 ENCOUNTER — ANCILLARY PROCEDURE (OUTPATIENT)
Dept: GENERAL RADIOLOGY | Facility: CLINIC | Age: 66
End: 2021-01-01
Attending: PHYSICIAN ASSISTANT
Payer: COMMERCIAL

## 2021-01-01 ENCOUNTER — APPOINTMENT (OUTPATIENT)
Dept: OCCUPATIONAL THERAPY | Facility: CLINIC | Age: 66
DRG: 165 | End: 2021-01-01
Attending: PHYSICIAN ASSISTANT
Payer: COMMERCIAL

## 2021-01-01 ENCOUNTER — NURSE TRIAGE (OUTPATIENT)
Dept: NURSING | Facility: CLINIC | Age: 66
End: 2021-01-01

## 2021-01-01 ENCOUNTER — OFFICE VISIT (OUTPATIENT)
Dept: FAMILY MEDICINE | Facility: CLINIC | Age: 66
End: 2021-01-01
Payer: COMMERCIAL

## 2021-01-01 ENCOUNTER — MYC MEDICAL ADVICE (OUTPATIENT)
Dept: SURGERY | Facility: CLINIC | Age: 66
End: 2021-01-01

## 2021-01-01 ENCOUNTER — APPOINTMENT (OUTPATIENT)
Dept: GENERAL RADIOLOGY | Facility: CLINIC | Age: 66
DRG: 165 | End: 2021-01-01
Attending: THORACIC SURGERY (CARDIOTHORACIC VASCULAR SURGERY)
Payer: COMMERCIAL

## 2021-01-01 ENCOUNTER — HOSPITAL ENCOUNTER (OUTPATIENT)
Dept: PET IMAGING | Facility: CLINIC | Age: 66
Discharge: HOME | DRG: 165 | End: 2021-06-14
Attending: CLINICAL NURSE SPECIALIST | Admitting: CLINICAL NURSE SPECIALIST
Payer: COMMERCIAL

## 2021-01-01 ENCOUNTER — ANESTHESIA EVENT (OUTPATIENT)
Dept: SURGERY | Facility: CLINIC | Age: 66
DRG: 165 | End: 2021-01-01
Payer: COMMERCIAL

## 2021-01-01 ENCOUNTER — VIRTUAL VISIT (OUTPATIENT)
Dept: SURGERY | Facility: CLINIC | Age: 66
End: 2021-01-01
Attending: THORACIC SURGERY (CARDIOTHORACIC VASCULAR SURGERY)
Payer: COMMERCIAL

## 2021-01-01 ENCOUNTER — ANCILLARY PROCEDURE (OUTPATIENT)
Dept: GENERAL RADIOLOGY | Facility: CLINIC | Age: 66
End: 2021-01-01
Attending: INTERNAL MEDICINE
Payer: COMMERCIAL

## 2021-01-01 ENCOUNTER — OFFICE VISIT (OUTPATIENT)
Dept: SURGERY | Facility: CLINIC | Age: 66
End: 2021-01-01
Payer: COMMERCIAL

## 2021-01-01 ENCOUNTER — PRE VISIT (OUTPATIENT)
Dept: SURGERY | Facility: CLINIC | Age: 66
End: 2021-01-01

## 2021-01-01 ENCOUNTER — HOSPITAL ENCOUNTER (INPATIENT)
Facility: CLINIC | Age: 66
LOS: 2 days | Discharge: HOME | DRG: 165 | End: 2021-06-18
Attending: THORACIC SURGERY (CARDIOTHORACIC VASCULAR SURGERY) | Admitting: THORACIC SURGERY (CARDIOTHORACIC VASCULAR SURGERY)
Payer: COMMERCIAL

## 2021-01-01 ENCOUNTER — APPOINTMENT (OUTPATIENT)
Dept: LAB | Facility: CLINIC | Age: 66
End: 2021-01-01
Payer: COMMERCIAL

## 2021-01-01 ENCOUNTER — PATIENT OUTREACH (OUTPATIENT)
Dept: CARE COORDINATION | Facility: CLINIC | Age: 66
End: 2021-01-01

## 2021-01-01 ENCOUNTER — TRANSFERRED RECORDS (OUTPATIENT)
Dept: HEALTH INFORMATION MANAGEMENT | Facility: CLINIC | Age: 66
End: 2021-01-01

## 2021-01-01 VITALS
HEART RATE: 61 BPM | WEIGHT: 185.4 LBS | RESPIRATION RATE: 16 BRPM | DIASTOLIC BLOOD PRESSURE: 60 MMHG | SYSTOLIC BLOOD PRESSURE: 126 MMHG | BODY MASS INDEX: 27.46 KG/M2 | OXYGEN SATURATION: 95 % | TEMPERATURE: 96.1 F | HEIGHT: 69 IN

## 2021-01-01 VITALS
WEIGHT: 175 LBS | BODY MASS INDEX: 25.84 KG/M2 | OXYGEN SATURATION: 95 % | SYSTOLIC BLOOD PRESSURE: 126 MMHG | DIASTOLIC BLOOD PRESSURE: 64 MMHG | HEART RATE: 95 BPM

## 2021-01-01 VITALS
WEIGHT: 178.7 LBS | HEIGHT: 69 IN | OXYGEN SATURATION: 94 % | DIASTOLIC BLOOD PRESSURE: 76 MMHG | BODY MASS INDEX: 26.47 KG/M2 | SYSTOLIC BLOOD PRESSURE: 119 MMHG | HEART RATE: 73 BPM | TEMPERATURE: 97.9 F | RESPIRATION RATE: 16 BRPM

## 2021-01-01 DIAGNOSIS — Z01.818 PREOP EXAMINATION: Primary | ICD-10-CM

## 2021-01-01 DIAGNOSIS — R91.1 LUNG NODULE: Primary | ICD-10-CM

## 2021-01-01 DIAGNOSIS — Z91.041 CONTRAST MEDIA ALLERGY: Primary | ICD-10-CM

## 2021-01-01 DIAGNOSIS — Z11.59 ENCOUNTER FOR SCREENING FOR OTHER VIRAL DISEASES: ICD-10-CM

## 2021-01-01 DIAGNOSIS — C34.92 NON-SMALL CELL LUNG CANCER, LEFT (H): Primary | ICD-10-CM

## 2021-01-01 DIAGNOSIS — R91.1 LUNG NODULE: ICD-10-CM

## 2021-01-01 DIAGNOSIS — C34.92 ADENOCARCINOMA, LUNG, LEFT (H): ICD-10-CM

## 2021-01-01 DIAGNOSIS — Z71.89 OTHER SPECIFIED COUNSELING: ICD-10-CM

## 2021-01-01 DIAGNOSIS — R35.0 BENIGN PROSTATIC HYPERPLASIA WITH URINARY FREQUENCY: Primary | ICD-10-CM

## 2021-01-01 DIAGNOSIS — I25.84 CORONARY ARTERY DISEASE DUE TO CALCIFIED CORONARY LESION: ICD-10-CM

## 2021-01-01 DIAGNOSIS — I25.10 CORONARY ARTERY DISEASE DUE TO CALCIFIED CORONARY LESION: ICD-10-CM

## 2021-01-01 DIAGNOSIS — N40.1 BENIGN PROSTATIC HYPERPLASIA WITH URINARY FREQUENCY: Primary | ICD-10-CM

## 2021-01-01 DIAGNOSIS — C34.90 LUNG CANCER (H): Primary | ICD-10-CM

## 2021-01-01 DIAGNOSIS — Z01.818 PREOP EXAMINATION: ICD-10-CM

## 2021-01-01 LAB
ABO + RH BLD: NORMAL
ABO + RH BLD: NORMAL
ANION GAP SERPL CALCULATED.3IONS-SCNC: 3 MMOL/L (ref 3–14)
ANION GAP SERPL CALCULATED.3IONS-SCNC: 5 MMOL/L (ref 3–14)
ANION GAP SERPL CALCULATED.3IONS-SCNC: <1 MMOL/L (ref 3–14)
BASE DEFICIT BLDA-SCNC: 1.5 MMOL/L
BASE DEFICIT BLDA-SCNC: 1.5 MMOL/L
BASE DEFICIT BLDA-SCNC: 2.2 MMOL/L
BLD GP AB SCN SERPL QL: NORMAL
BLOOD BANK CMNT PATIENT-IMP: NORMAL
BUN SERPL-MCNC: 20 MG/DL (ref 7–30)
BUN SERPL-MCNC: 22 MG/DL (ref 7–30)
BUN SERPL-MCNC: 23 MG/DL (ref 7–30)
CA-I BLD-MCNC: 4.6 MG/DL (ref 4.4–5.2)
CA-I BLD-MCNC: 4.6 MG/DL (ref 4.4–5.2)
CA-I BLD-MCNC: 4.7 MG/DL (ref 4.4–5.2)
CALCIUM SERPL-MCNC: 8.3 MG/DL (ref 8.5–10.1)
CALCIUM SERPL-MCNC: 8.5 MG/DL (ref 8.5–10.1)
CALCIUM SERPL-MCNC: 9.3 MG/DL (ref 8.5–10.1)
CHLORIDE SERPL-SCNC: 106 MMOL/L (ref 94–109)
CHLORIDE SERPL-SCNC: 110 MMOL/L (ref 94–109)
CHLORIDE SERPL-SCNC: 110 MMOL/L (ref 94–109)
CO2 SERPL-SCNC: 25 MMOL/L (ref 20–32)
CO2 SERPL-SCNC: 27 MMOL/L (ref 20–32)
CO2 SERPL-SCNC: 28 MMOL/L (ref 20–32)
COPATH REPORT: NORMAL
CREAT SERPL-MCNC: 0.75 MG/DL (ref 0.66–1.25)
CREAT SERPL-MCNC: 0.8 MG/DL (ref 0.66–1.25)
CREAT SERPL-MCNC: 0.82 MG/DL (ref 0.66–1.25)
CREAT SERPL-MCNC: 0.88 MG/DL (ref 0.66–1.25)
ERYTHROCYTE [DISTWIDTH] IN BLOOD BY AUTOMATED COUNT: 14.3 % (ref 10–15)
ERYTHROCYTE [DISTWIDTH] IN BLOOD BY AUTOMATED COUNT: 14.6 % (ref 10–15)
ERYTHROCYTE [DISTWIDTH] IN BLOOD BY AUTOMATED COUNT: 15 % (ref 10–15)
GFR SERPL CREATININE-BSD FRML MDRD: 89 ML/MIN/{1.73_M2}
GFR SERPL CREATININE-BSD FRML MDRD: >90 ML/MIN/{1.73_M2}
GLUCOSE BLD-MCNC: 101 MG/DL (ref 70–99)
GLUCOSE BLD-MCNC: 111 MG/DL (ref 70–99)
GLUCOSE BLD-MCNC: 134 MG/DL (ref 70–99)
GLUCOSE BLDC GLUCOMTR-MCNC: 83 MG/DL (ref 70–99)
GLUCOSE SERPL-MCNC: 139 MG/DL (ref 70–99)
GLUCOSE SERPL-MCNC: 147 MG/DL (ref 70–99)
GLUCOSE SERPL-MCNC: 88 MG/DL (ref 70–99)
HCO3 BLD-SCNC: 25 MMOL/L (ref 21–28)
HCO3 BLD-SCNC: 25 MMOL/L (ref 21–28)
HCO3 BLD-SCNC: 26 MMOL/L (ref 21–28)
HCT VFR BLD AUTO: 33.5 % (ref 40–53)
HCT VFR BLD AUTO: 33.6 % (ref 40–53)
HCT VFR BLD AUTO: 41.6 % (ref 40–53)
HGB BLD-MCNC: 10.7 G/DL (ref 13.3–17.7)
HGB BLD-MCNC: 10.8 G/DL (ref 13.3–17.7)
HGB BLD-MCNC: 11.9 G/DL (ref 13.3–17.7)
HGB BLD-MCNC: 12.1 G/DL (ref 13.3–17.7)
HGB BLD-MCNC: 12.2 G/DL (ref 13.3–17.7)
HGB BLD-MCNC: 13.8 G/DL (ref 13.3–17.7)
LABORATORY COMMENT REPORT: NORMAL
LACTATE BLD-SCNC: 0.6 MMOL/L (ref 0.7–2)
LACTATE BLD-SCNC: 1.1 MMOL/L (ref 0.7–2)
LACTATE BLD-SCNC: 1.5 MMOL/L (ref 0.7–2)
MAGNESIUM SERPL-MCNC: 1.9 MG/DL (ref 1.6–2.3)
MAGNESIUM SERPL-MCNC: 1.9 MG/DL (ref 1.6–2.3)
MCH RBC QN AUTO: 28.1 PG (ref 26.5–33)
MCH RBC QN AUTO: 28.1 PG (ref 26.5–33)
MCH RBC QN AUTO: 28.6 PG (ref 26.5–33)
MCHC RBC AUTO-ENTMCNC: 31.8 G/DL (ref 31.5–36.5)
MCHC RBC AUTO-ENTMCNC: 32.2 G/DL (ref 31.5–36.5)
MCHC RBC AUTO-ENTMCNC: 33.2 G/DL (ref 31.5–36.5)
MCV RBC AUTO: 86 FL (ref 78–100)
MCV RBC AUTO: 87 FL (ref 78–100)
MCV RBC AUTO: 88 FL (ref 78–100)
NT-PROBNP SERPL-MCNC: 188 PG/ML (ref 0–125)
O2/TOTAL GAS SETTING VFR VENT: 100 %
O2/TOTAL GAS SETTING VFR VENT: 100 %
O2/TOTAL GAS SETTING VFR VENT: 88 %
OXYHGB MFR BLD: 95 % (ref 92–100)
PCO2 BLD: 46 MM HG (ref 35–45)
PCO2 BLD: 51 MM HG (ref 35–45)
PCO2 BLD: 57 MM HG (ref 35–45)
PH BLD: 7.27 PH (ref 7.35–7.45)
PH BLD: 7.3 PH (ref 7.35–7.45)
PH BLD: 7.34 PH (ref 7.35–7.45)
PLATELET # BLD AUTO: 186 10E9/L (ref 150–450)
PLATELET # BLD AUTO: 191 10E9/L (ref 150–450)
PLATELET # BLD AUTO: 225 10E9/L (ref 150–450)
PO2 BLD: 100 MM HG (ref 80–105)
PO2 BLD: 169 MM HG (ref 80–105)
PO2 BLD: 71 MM HG (ref 80–105)
POTASSIUM BLD-SCNC: 3.8 MMOL/L (ref 3.4–5.3)
POTASSIUM BLD-SCNC: 4.1 MMOL/L (ref 3.4–5.3)
POTASSIUM BLD-SCNC: 4.5 MMOL/L (ref 3.4–5.3)
POTASSIUM SERPL-SCNC: 4 MMOL/L (ref 3.4–5.3)
POTASSIUM SERPL-SCNC: 4.1 MMOL/L (ref 3.4–5.3)
POTASSIUM SERPL-SCNC: 4.1 MMOL/L (ref 3.4–5.3)
RBC # BLD AUTO: 3.81 10E12/L (ref 4.4–5.9)
RBC # BLD AUTO: 3.84 10E12/L (ref 4.4–5.9)
RBC # BLD AUTO: 4.83 10E12/L (ref 4.4–5.9)
SARS-COV-2 RNA RESP QL NAA+PROBE: NEGATIVE
SARS-COV-2 RNA RESP QL NAA+PROBE: NORMAL
SODIUM BLD-SCNC: 140 MMOL/L (ref 133–144)
SODIUM BLD-SCNC: 140 MMOL/L (ref 133–144)
SODIUM BLD-SCNC: 141 MMOL/L (ref 133–144)
SODIUM SERPL-SCNC: 137 MMOL/L (ref 133–144)
SODIUM SERPL-SCNC: 137 MMOL/L (ref 133–144)
SODIUM SERPL-SCNC: 141 MMOL/L (ref 133–144)
SPECIMEN EXP DATE BLD: NORMAL
SPECIMEN SOURCE: NORMAL
SPECIMEN SOURCE: NORMAL
WBC # BLD AUTO: 11.3 10E9/L (ref 4–11)
WBC # BLD AUTO: 13.9 10E9/L (ref 4–11)
WBC # BLD AUTO: 15.1 10E9/L (ref 4–11)

## 2021-01-01 PROCEDURE — 360N000080 HC SURGERY LEVEL 7, PER MIN: Performed by: THORACIC SURGERY (CARDIOTHORACIC VASCULAR SURGERY)

## 2021-01-01 PROCEDURE — 86901 BLOOD TYPING SEROLOGIC RH(D): CPT | Performed by: PATHOLOGY

## 2021-01-01 PROCEDURE — 71045 X-RAY EXAM CHEST 1 VIEW: CPT | Mod: 26 | Performed by: RADIOLOGY

## 2021-01-01 PROCEDURE — 120N000002 HC R&B MED SURG/OB UMMC

## 2021-01-01 PROCEDURE — 999N000015 HC STATISTIC ARTERIAL MONITORING DAILY

## 2021-01-01 PROCEDURE — 99407 BEHAV CHNG SMOKING > 10 MIN: CPT

## 2021-01-01 PROCEDURE — 370N000017 HC ANESTHESIA TECHNICAL FEE, PER MIN: Performed by: THORACIC SURGERY (CARDIOTHORACIC VASCULAR SURGERY)

## 2021-01-01 PROCEDURE — 99024 POSTOP FOLLOW-UP VISIT: CPT | Performed by: CLINICAL NURSE SPECIALIST

## 2021-01-01 PROCEDURE — 999N001017 HC STATISTIC GLUCOSE BY METER IP

## 2021-01-01 PROCEDURE — 85027 COMPLETE CBC AUTOMATED: CPT | Performed by: PATHOLOGY

## 2021-01-01 PROCEDURE — 250N000011 HC RX IP 250 OP 636: Performed by: CLINICAL NURSE SPECIALIST

## 2021-01-01 PROCEDURE — 07B74ZZ EXCISION OF THORAX LYMPHATIC, PERCUTANEOUS ENDOSCOPIC APPROACH: ICD-10-PCS | Performed by: THORACIC SURGERY (CARDIOTHORACIC VASCULAR SURGERY)

## 2021-01-01 PROCEDURE — U0005 INFEC AGEN DETEC AMPLI PROBE: HCPCS | Performed by: PATHOLOGY

## 2021-01-01 PROCEDURE — 88342 IMHCHEM/IMCYTCHM 1ST ANTB: CPT | Mod: TC | Performed by: THORACIC SURGERY (CARDIOTHORACIC VASCULAR SURGERY)

## 2021-01-01 PROCEDURE — 71045 X-RAY EXAM CHEST 1 VIEW: CPT

## 2021-01-01 PROCEDURE — 82803 BLOOD GASES ANY COMBINATION: CPT

## 2021-01-01 PROCEDURE — U0003 INFECTIOUS AGENT DETECTION BY NUCLEIC ACID (DNA OR RNA); SEVERE ACUTE RESPIRATORY SYNDROME CORONAVIRUS 2 (SARS-COV-2) (CORONAVIRUS DISEASE [COVID-19]), AMPLIFIED PROBE TECHNIQUE, MAKING USE OF HIGH THROUGHPUT TECHNOLOGIES AS DESCRIBED BY CMS-2020-01-R: HCPCS | Performed by: PATHOLOGY

## 2021-01-01 PROCEDURE — 999N001020 HC STATISTIC H-SEND OUTS PREP: Performed by: THORACIC SURGERY (CARDIOTHORACIC VASCULAR SURGERY)

## 2021-01-01 PROCEDURE — 78815 PET IMAGE W/CT SKULL-THIGH: CPT | Mod: PI

## 2021-01-01 PROCEDURE — 88341 IMHCHEM/IMCYTCHM EA ADD ANTB: CPT | Mod: TC | Performed by: THORACIC SURGERY (CARDIOTHORACIC VASCULAR SURGERY)

## 2021-01-01 PROCEDURE — 84132 ASSAY OF SERUM POTASSIUM: CPT | Performed by: ANESTHESIOLOGY

## 2021-01-01 PROCEDURE — 83605 ASSAY OF LACTIC ACID: CPT | Performed by: ANESTHESIOLOGY

## 2021-01-01 PROCEDURE — 93016 CV STRESS TEST SUPVJ ONLY: CPT | Performed by: INTERNAL MEDICINE

## 2021-01-01 PROCEDURE — 250N000009 HC RX 250: Performed by: ANESTHESIOLOGY

## 2021-01-01 PROCEDURE — 78815 PET IMAGE W/CT SKULL-THIGH: CPT | Mod: 26 | Performed by: RADIOLOGY

## 2021-01-01 PROCEDURE — 88360 TUMOR IMMUNOHISTOCHEM/MANUAL: CPT | Mod: TC | Performed by: THORACIC SURGERY (CARDIOTHORACIC VASCULAR SURGERY)

## 2021-01-01 PROCEDURE — 71260 CT THORAX DX C+: CPT | Mod: 26 | Performed by: RADIOLOGY

## 2021-01-01 PROCEDURE — 250N000009 HC RX 250: Performed by: NURSE ANESTHETIST, CERTIFIED REGISTERED

## 2021-01-01 PROCEDURE — 80048 BASIC METABOLIC PNL TOTAL CA: CPT | Performed by: PATHOLOGY

## 2021-01-01 PROCEDURE — 88331 PATH CONSLTJ SURG 1 BLK 1SPC: CPT | Mod: 26 | Performed by: PATHOLOGY

## 2021-01-01 PROCEDURE — 83605 ASSAY OF LACTIC ACID: CPT

## 2021-01-01 PROCEDURE — G0452 MOLECULAR PATHOLOGY INTERPR: HCPCS | Mod: 26 | Performed by: PATHOLOGY

## 2021-01-01 PROCEDURE — 99214 OFFICE O/P EST MOD 30 MIN: CPT | Performed by: INTERNAL MEDICINE

## 2021-01-01 PROCEDURE — 83735 ASSAY OF MAGNESIUM: CPT | Performed by: PHYSICIAN ASSISTANT

## 2021-01-01 PROCEDURE — 82947 ASSAY GLUCOSE BLOOD QUANT: CPT | Performed by: ANESTHESIOLOGY

## 2021-01-01 PROCEDURE — 86900 BLOOD TYPING SEROLOGIC ABO: CPT | Performed by: PATHOLOGY

## 2021-01-01 PROCEDURE — 88360 TUMOR IMMUNOHISTOCHEM/MANUAL: CPT | Mod: 26 | Performed by: PATHOLOGY

## 2021-01-01 PROCEDURE — 36415 COLL VENOUS BLD VENIPUNCTURE: CPT | Performed by: PHYSICIAN ASSISTANT

## 2021-01-01 PROCEDURE — 999N000065 XR CHEST PORT 1 VIEW

## 2021-01-01 PROCEDURE — 71250 CT THORAX DX C-: CPT

## 2021-01-01 PROCEDURE — 250N000011 HC RX IP 250 OP 636: Performed by: ANESTHESIOLOGY

## 2021-01-01 PROCEDURE — 85027 COMPLETE CBC AUTOMATED: CPT | Performed by: PHYSICIAN ASSISTANT

## 2021-01-01 PROCEDURE — 250N000009 HC RX 250: Performed by: STUDENT IN AN ORGANIZED HEALTH CARE EDUCATION/TRAINING PROGRAM

## 2021-01-01 PROCEDURE — 88332 PATH CONSLTJ SURG EA ADD BLK: CPT | Mod: 26 | Performed by: PATHOLOGY

## 2021-01-01 PROCEDURE — 88309 TISSUE EXAM BY PATHOLOGIST: CPT | Mod: TC | Performed by: THORACIC SURGERY (CARDIOTHORACIC VASCULAR SURGERY)

## 2021-01-01 PROCEDURE — 97530 THERAPEUTIC ACTIVITIES: CPT | Mod: GO

## 2021-01-01 PROCEDURE — 74177 CT ABD & PELVIS W/CONTRAST: CPT | Mod: 26 | Performed by: RADIOLOGY

## 2021-01-01 PROCEDURE — 99204 OFFICE O/P NEW MOD 45 MIN: CPT | Performed by: PHYSICIAN ASSISTANT

## 2021-01-01 PROCEDURE — 88332 PATH CONSLTJ SURG EA ADD BLK: CPT | Mod: TC | Performed by: THORACIC SURGERY (CARDIOTHORACIC VASCULAR SURGERY)

## 2021-01-01 PROCEDURE — 250N000011 HC RX IP 250 OP 636: Performed by: STUDENT IN AN ORGANIZED HEALTH CARE EDUCATION/TRAINING PROGRAM

## 2021-01-01 PROCEDURE — 250N000011 HC RX IP 250 OP 636: Performed by: INTERNAL MEDICINE

## 2021-01-01 PROCEDURE — 86850 RBC ANTIBODY SCREEN: CPT | Performed by: PATHOLOGY

## 2021-01-01 PROCEDURE — 36415 COLL VENOUS BLD VENIPUNCTURE: CPT | Performed by: PATHOLOGY

## 2021-01-01 PROCEDURE — 84295 ASSAY OF SERUM SODIUM: CPT

## 2021-01-01 PROCEDURE — 258N000003 HC RX IP 258 OP 636: Performed by: ANESTHESIOLOGY

## 2021-01-01 PROCEDURE — 97535 SELF CARE MNGMENT TRAINING: CPT | Mod: GO

## 2021-01-01 PROCEDURE — A9552 F18 FDG: HCPCS | Performed by: CLINICAL NURSE SPECIALIST

## 2021-01-01 PROCEDURE — 250N000013 HC RX MED GY IP 250 OP 250 PS 637: Performed by: PHYSICIAN ASSISTANT

## 2021-01-01 PROCEDURE — 93321 DOPPLER ECHO F-UP/LMTD STD: CPT | Mod: 26 | Performed by: INTERNAL MEDICINE

## 2021-01-01 PROCEDURE — 71046 X-RAY EXAM CHEST 2 VIEWS: CPT | Performed by: RADIOLOGY

## 2021-01-01 PROCEDURE — 82565 ASSAY OF CREATININE: CPT | Performed by: PHYSICIAN ASSISTANT

## 2021-01-01 PROCEDURE — 80048 BASIC METABOLIC PNL TOTAL CA: CPT | Performed by: PHYSICIAN ASSISTANT

## 2021-01-01 PROCEDURE — 250N000009 HC RX 250: Performed by: INTERNAL MEDICINE

## 2021-01-01 PROCEDURE — 93350 STRESS TTE ONLY: CPT | Mod: 26 | Performed by: INTERNAL MEDICINE

## 2021-01-01 PROCEDURE — 250N000009 HC RX 250: Performed by: THORACIC SURGERY (CARDIOTHORACIC VASCULAR SURGERY)

## 2021-01-01 PROCEDURE — 88341 IMHCHEM/IMCYTCHM EA ADD ANTB: CPT | Mod: 26 | Performed by: PATHOLOGY

## 2021-01-01 PROCEDURE — 250N000013 HC RX MED GY IP 250 OP 250 PS 637

## 2021-01-01 PROCEDURE — 88305 TISSUE EXAM BY PATHOLOGIST: CPT | Mod: TC | Performed by: THORACIC SURGERY (CARDIOTHORACIC VASCULAR SURGERY)

## 2021-01-01 PROCEDURE — 88307 TISSUE EXAM BY PATHOLOGIST: CPT | Mod: TC | Performed by: THORACIC SURGERY (CARDIOTHORACIC VASCULAR SURGERY)

## 2021-01-01 PROCEDURE — 250N000011 HC RX IP 250 OP 636: Performed by: NURSE ANESTHETIST, CERTIFIED REGISTERED

## 2021-01-01 PROCEDURE — 93018 CV STRESS TEST I&R ONLY: CPT | Performed by: INTERNAL MEDICINE

## 2021-01-01 PROCEDURE — 97165 OT EVAL LOW COMPLEX 30 MIN: CPT | Mod: GO

## 2021-01-01 PROCEDURE — 88305 TISSUE EXAM BY PATHOLOGIST: CPT | Mod: 26 | Performed by: PATHOLOGY

## 2021-01-01 PROCEDURE — 999N000141 HC STATISTIC PRE-PROCEDURE NURSING ASSESSMENT: Performed by: THORACIC SURGERY (CARDIOTHORACIC VASCULAR SURGERY)

## 2021-01-01 PROCEDURE — 88342 IMHCHEM/IMCYTCHM 1ST ANTB: CPT | Mod: 26 | Performed by: PATHOLOGY

## 2021-01-01 PROCEDURE — 82810 BLOOD GASES O2 SAT ONLY: CPT

## 2021-01-01 PROCEDURE — 250N000011 HC RX IP 250 OP 636: Performed by: PHYSICIAN ASSISTANT

## 2021-01-01 PROCEDURE — 250N000025 HC SEVOFLURANE, PER MIN: Performed by: THORACIC SURGERY (CARDIOTHORACIC VASCULAR SURGERY)

## 2021-01-01 PROCEDURE — 710N000010 HC RECOVERY PHASE 1, LEVEL 2, PER MIN: Performed by: THORACIC SURGERY (CARDIOTHORACIC VASCULAR SURGERY)

## 2021-01-01 PROCEDURE — 84132 ASSAY OF SERUM POTASSIUM: CPT

## 2021-01-01 PROCEDURE — 82803 BLOOD GASES ANY COMBINATION: CPT | Performed by: ANESTHESIOLOGY

## 2021-01-01 PROCEDURE — 343N000001 HC RX 343: Performed by: CLINICAL NURSE SPECIALIST

## 2021-01-01 PROCEDURE — 88309 TISSUE EXAM BY PATHOLOGIST: CPT | Mod: 26 | Performed by: PATHOLOGY

## 2021-01-01 PROCEDURE — 88307 TISSUE EXAM BY PATHOLOGIST: CPT | Mod: 26 | Performed by: PATHOLOGY

## 2021-01-01 PROCEDURE — 82330 ASSAY OF CALCIUM: CPT | Performed by: ANESTHESIOLOGY

## 2021-01-01 PROCEDURE — 250N000011 HC RX IP 250 OP 636: Performed by: THORACIC SURGERY (CARDIOTHORACIC VASCULAR SURGERY)

## 2021-01-01 PROCEDURE — 88331 PATH CONSLTJ SURG 1 BLK 1SPC: CPT | Mod: TC | Performed by: THORACIC SURGERY (CARDIOTHORACIC VASCULAR SURGERY)

## 2021-01-01 PROCEDURE — 272N000001 HC OR GENERAL SUPPLY STERILE: Performed by: THORACIC SURGERY (CARDIOTHORACIC VASCULAR SURGERY)

## 2021-01-01 PROCEDURE — 258N000003 HC RX IP 258 OP 636: Performed by: STUDENT IN AN ORGANIZED HEALTH CARE EDUCATION/TRAINING PROGRAM

## 2021-01-01 PROCEDURE — 93325 DOPPLER ECHO COLOR FLOW MAPG: CPT | Mod: 26 | Performed by: INTERNAL MEDICINE

## 2021-01-01 PROCEDURE — 999N000157 HC STATISTIC RCP TIME EA 10 MIN

## 2021-01-01 PROCEDURE — 255N000002 HC RX 255 OP 636: Performed by: INTERNAL MEDICINE

## 2021-01-01 PROCEDURE — 84295 ASSAY OF SERUM SODIUM: CPT | Performed by: ANESTHESIOLOGY

## 2021-01-01 PROCEDURE — 82330 ASSAY OF CALCIUM: CPT

## 2021-01-01 PROCEDURE — 0BTG4ZZ RESECTION OF LEFT UPPER LUNG LOBE, PERCUTANEOUS ENDOSCOPIC APPROACH: ICD-10-PCS | Performed by: THORACIC SURGERY (CARDIOTHORACIC VASCULAR SURGERY)

## 2021-01-01 PROCEDURE — 999N000033 HC STATISTIC CHRONIC PULMONARY DISEASE SPECIALIST

## 2021-01-01 PROCEDURE — 81445 SO NEO GSAP 5-50DNA/DNA&RNA: CPT | Performed by: THORACIC SURGERY (CARDIOTHORACIC VASCULAR SURGERY)

## 2021-01-01 PROCEDURE — 71045 X-RAY EXAM CHEST 1 VIEW: CPT | Mod: 77

## 2021-01-01 PROCEDURE — 82947 ASSAY GLUCOSE BLOOD QUANT: CPT

## 2021-01-01 PROCEDURE — 8E0W4CZ ROBOTIC ASSISTED PROCEDURE OF TRUNK REGION, PERCUTANEOUS ENDOSCOPIC APPROACH: ICD-10-PCS | Performed by: THORACIC SURGERY (CARDIOTHORACIC VASCULAR SURGERY)

## 2021-01-01 PROCEDURE — 83880 ASSAY OF NATRIURETIC PEPTIDE: CPT | Performed by: PATHOLOGY

## 2021-01-01 RX ORDER — OXYCODONE HYDROCHLORIDE 5 MG/1
5-10 TABLET ORAL EVERY 4 HOURS PRN
Qty: 40 TABLET | Refills: 0 | Status: SHIPPED | OUTPATIENT
Start: 2021-01-01 | End: 2022-01-01

## 2021-01-01 RX ORDER — AMOXICILLIN 250 MG
1 CAPSULE ORAL 2 TIMES DAILY
Status: DISCONTINUED | OUTPATIENT
Start: 2021-01-01 | End: 2021-01-01 | Stop reason: HOSPADM

## 2021-01-01 RX ORDER — NALOXONE HYDROCHLORIDE 0.4 MG/ML
0.2 INJECTION, SOLUTION INTRAMUSCULAR; INTRAVENOUS; SUBCUTANEOUS
Status: DISCONTINUED | OUTPATIENT
Start: 2021-01-01 | End: 2021-01-01 | Stop reason: HOSPADM

## 2021-01-01 RX ORDER — FENTANYL CITRATE 50 UG/ML
INJECTION, SOLUTION INTRAMUSCULAR; INTRAVENOUS PRN
Status: DISCONTINUED | OUTPATIENT
Start: 2021-01-01 | End: 2021-01-01

## 2021-01-01 RX ORDER — PROCHLORPERAZINE MALEATE 5 MG
5 TABLET ORAL EVERY 6 HOURS PRN
Status: DISCONTINUED | OUTPATIENT
Start: 2021-01-01 | End: 2021-01-01 | Stop reason: HOSPADM

## 2021-01-01 RX ORDER — MULTIVITAMIN WITH IRON
1 TABLET ORAL EVERY MORNING
COMMUNITY
End: 2022-01-01

## 2021-01-01 RX ORDER — LIDOCAINE HYDROCHLORIDE 20 MG/ML
INJECTION, SOLUTION INFILTRATION; PERINEURAL PRN
Status: DISCONTINUED | OUTPATIENT
Start: 2021-01-01 | End: 2021-01-01

## 2021-01-01 RX ORDER — HYDROMORPHONE HCL IN WATER/PF 6 MG/30 ML
0.2 PATIENT CONTROLLED ANALGESIA SYRINGE INTRAVENOUS
Status: DISCONTINUED | OUTPATIENT
Start: 2021-01-01 | End: 2021-01-01 | Stop reason: HOSPADM

## 2021-01-01 RX ORDER — BUPIVACAINE HYDROCHLORIDE 2.5 MG/ML
INJECTION, SOLUTION INFILTRATION; PERINEURAL PRN
Status: DISCONTINUED | OUTPATIENT
Start: 2021-01-01 | End: 2021-01-01 | Stop reason: HOSPADM

## 2021-01-01 RX ORDER — CLINDAMYCIN PHOSPHATE 900 MG/50ML
900 INJECTION, SOLUTION INTRAVENOUS
Status: CANCELLED | OUTPATIENT
Start: 2021-01-01

## 2021-01-01 RX ORDER — EPHEDRINE SULFATE 50 MG/ML
INJECTION, SOLUTION INTRAMUSCULAR; INTRAVENOUS; SUBCUTANEOUS PRN
Status: DISCONTINUED | OUTPATIENT
Start: 2021-01-01 | End: 2021-01-01

## 2021-01-01 RX ORDER — OXYCODONE HYDROCHLORIDE 5 MG/1
5 TABLET ORAL EVERY 4 HOURS PRN
Status: DISCONTINUED | OUTPATIENT
Start: 2021-01-01 | End: 2021-01-01 | Stop reason: HOSPADM

## 2021-01-01 RX ORDER — POLYETHYLENE GLYCOL 3350 17 G/17G
17 POWDER, FOR SOLUTION ORAL DAILY
Status: DISCONTINUED | OUTPATIENT
Start: 2021-01-01 | End: 2021-01-01 | Stop reason: HOSPADM

## 2021-01-01 RX ORDER — CLINDAMYCIN PHOSPHATE 900 MG/50ML
900 INJECTION, SOLUTION INTRAVENOUS SEE ADMIN INSTRUCTIONS
Status: CANCELLED | OUTPATIENT
Start: 2021-01-01

## 2021-01-01 RX ORDER — ONDANSETRON 2 MG/ML
4 INJECTION INTRAMUSCULAR; INTRAVENOUS EVERY 30 MIN PRN
Status: DISCONTINUED | OUTPATIENT
Start: 2021-01-01 | End: 2021-01-01 | Stop reason: HOSPADM

## 2021-01-01 RX ORDER — ONDANSETRON 2 MG/ML
4 INJECTION INTRAMUSCULAR; INTRAVENOUS EVERY 6 HOURS PRN
Status: DISCONTINUED | OUTPATIENT
Start: 2021-01-01 | End: 2021-01-01 | Stop reason: HOSPADM

## 2021-01-01 RX ORDER — IBUPROFEN 400 MG/1
400 TABLET, FILM COATED ORAL EVERY 6 HOURS
Status: DISCONTINUED | OUTPATIENT
Start: 2021-01-01 | End: 2021-01-01 | Stop reason: HOSPADM

## 2021-01-01 RX ORDER — DOBUTAMINE HYDROCHLORIDE 200 MG/100ML
10-50 INJECTION INTRAVENOUS CONTINUOUS
Status: ACTIVE | OUTPATIENT
Start: 2021-01-01 | End: 2021-01-01

## 2021-01-01 RX ORDER — METOPROLOL TARTRATE 1 MG/ML
1-20 INJECTION, SOLUTION INTRAVENOUS
Status: ACTIVE | OUTPATIENT
Start: 2021-01-01 | End: 2021-01-01

## 2021-01-01 RX ORDER — ATROPINE SULFATE 0.4 MG/ML
.2-2 AMPUL (ML) INJECTION
Status: COMPLETED | OUTPATIENT
Start: 2021-01-01 | End: 2021-01-01

## 2021-01-01 RX ORDER — SODIUM CHLORIDE, SODIUM LACTATE, POTASSIUM CHLORIDE, CALCIUM CHLORIDE 600; 310; 30; 20 MG/100ML; MG/100ML; MG/100ML; MG/100ML
INJECTION, SOLUTION INTRAVENOUS CONTINUOUS PRN
Status: DISCONTINUED | OUTPATIENT
Start: 2021-01-01 | End: 2021-01-01

## 2021-01-01 RX ORDER — HYDROMORPHONE HYDROCHLORIDE 1 MG/ML
.3-.5 INJECTION, SOLUTION INTRAMUSCULAR; INTRAVENOUS; SUBCUTANEOUS EVERY 5 MIN PRN
Status: DISCONTINUED | OUTPATIENT
Start: 2021-01-01 | End: 2021-01-01 | Stop reason: HOSPADM

## 2021-01-01 RX ORDER — TAMSULOSIN HYDROCHLORIDE 0.4 MG/1
0.4 CAPSULE ORAL DAILY
Qty: 90 CAPSULE | Refills: 3 | Status: SHIPPED | OUTPATIENT
Start: 2021-01-01 | End: 2022-01-01

## 2021-01-01 RX ORDER — ONDANSETRON 4 MG/1
4 TABLET, ORALLY DISINTEGRATING ORAL EVERY 30 MIN PRN
Status: DISCONTINUED | OUTPATIENT
Start: 2021-01-01 | End: 2021-01-01 | Stop reason: HOSPADM

## 2021-01-01 RX ORDER — CLINDAMYCIN PHOSPHATE 900 MG/50ML
900 INJECTION, SOLUTION INTRAVENOUS SEE ADMIN INSTRUCTIONS
Status: DISCONTINUED | OUTPATIENT
Start: 2021-01-01 | End: 2021-01-01 | Stop reason: HOSPADM

## 2021-01-01 RX ORDER — NALOXONE HYDROCHLORIDE 0.4 MG/ML
0.4 INJECTION, SOLUTION INTRAMUSCULAR; INTRAVENOUS; SUBCUTANEOUS
Status: DISCONTINUED | OUTPATIENT
Start: 2021-01-01 | End: 2021-01-01 | Stop reason: HOSPADM

## 2021-01-01 RX ORDER — SODIUM CHLORIDE 9 MG/ML
INJECTION, SOLUTION INTRAVENOUS CONTINUOUS
Status: ACTIVE | OUTPATIENT
Start: 2021-01-01 | End: 2021-01-01

## 2021-01-01 RX ORDER — PHENYLEPHRINE HCL IN 0.9% NACL 50MG/250ML
.1-1 PLASTIC BAG, INJECTION (ML) INTRAVENOUS CONTINUOUS
Status: DISCONTINUED | OUTPATIENT
Start: 2021-01-01 | End: 2021-01-01 | Stop reason: HOSPADM

## 2021-01-01 RX ORDER — PANTOPRAZOLE SODIUM 40 MG/1
40 TABLET, DELAYED RELEASE ORAL DAILY
Status: DISCONTINUED | OUTPATIENT
Start: 2021-01-01 | End: 2021-01-01 | Stop reason: HOSPADM

## 2021-01-01 RX ORDER — PROPOFOL 10 MG/ML
INJECTION, EMULSION INTRAVENOUS PRN
Status: DISCONTINUED | OUTPATIENT
Start: 2021-01-01 | End: 2021-01-01

## 2021-01-01 RX ORDER — ONDANSETRON 2 MG/ML
INJECTION INTRAMUSCULAR; INTRAVENOUS PRN
Status: DISCONTINUED | OUTPATIENT
Start: 2021-01-01 | End: 2021-01-01

## 2021-01-01 RX ORDER — LIDOCAINE 40 MG/G
CREAM TOPICAL
Status: DISCONTINUED | OUTPATIENT
Start: 2021-01-01 | End: 2021-01-01 | Stop reason: HOSPADM

## 2021-01-01 RX ORDER — ACETAMINOPHEN 500 MG
1000 TABLET ORAL EVERY 6 HOURS
COMMUNITY
Start: 2021-01-01 | End: 2022-01-01

## 2021-01-01 RX ORDER — OXYCODONE HYDROCHLORIDE 10 MG/1
10 TABLET ORAL EVERY 4 HOURS PRN
Status: DISCONTINUED | OUTPATIENT
Start: 2021-01-01 | End: 2021-01-01 | Stop reason: HOSPADM

## 2021-01-01 RX ORDER — ACETAMINOPHEN 500 MG
1000 TABLET ORAL EVERY 6 HOURS
Status: DISCONTINUED | OUTPATIENT
Start: 2021-01-01 | End: 2021-01-01 | Stop reason: HOSPADM

## 2021-01-01 RX ORDER — TAMSULOSIN HYDROCHLORIDE 0.4 MG/1
0.4 CAPSULE ORAL DAILY
Qty: 90 CAPSULE | Refills: 3 | Status: SHIPPED | OUTPATIENT
Start: 2021-01-01 | End: 2021-01-01

## 2021-01-01 RX ORDER — DEXAMETHASONE SODIUM PHOSPHATE 4 MG/ML
INJECTION, SOLUTION INTRA-ARTICULAR; INTRALESIONAL; INTRAMUSCULAR; INTRAVENOUS; SOFT TISSUE PRN
Status: DISCONTINUED | OUTPATIENT
Start: 2021-01-01 | End: 2021-01-01

## 2021-01-01 RX ORDER — AMOXICILLIN 250 MG
2 CAPSULE ORAL 2 TIMES DAILY
Qty: 50 TABLET | Refills: 0 | Status: SHIPPED | OUTPATIENT
Start: 2021-01-01 | End: 2022-01-01

## 2021-01-01 RX ORDER — SODIUM CHLORIDE, SODIUM LACTATE, POTASSIUM CHLORIDE, CALCIUM CHLORIDE 600; 310; 30; 20 MG/100ML; MG/100ML; MG/100ML; MG/100ML
INJECTION, SOLUTION INTRAVENOUS CONTINUOUS
Status: DISCONTINUED | OUTPATIENT
Start: 2021-01-01 | End: 2021-01-01 | Stop reason: HOSPADM

## 2021-01-01 RX ORDER — IBUPROFEN 400 MG/1
400 TABLET, FILM COATED ORAL EVERY 6 HOURS
COMMUNITY
Start: 2021-01-01 | End: 2022-01-01

## 2021-01-01 RX ORDER — ONDANSETRON 4 MG/1
4 TABLET, ORALLY DISINTEGRATING ORAL EVERY 6 HOURS PRN
Status: DISCONTINUED | OUTPATIENT
Start: 2021-01-01 | End: 2021-01-01 | Stop reason: HOSPADM

## 2021-01-01 RX ORDER — ATORVASTATIN CALCIUM 40 MG/1
40 TABLET, FILM COATED ORAL DAILY
Status: DISCONTINUED | OUTPATIENT
Start: 2021-01-01 | End: 2021-01-01 | Stop reason: HOSPADM

## 2021-01-01 RX ORDER — CLINDAMYCIN PHOSPHATE 900 MG/50ML
900 INJECTION, SOLUTION INTRAVENOUS
Status: DISCONTINUED | OUTPATIENT
Start: 2021-01-01 | End: 2021-01-01 | Stop reason: HOSPADM

## 2021-01-01 RX ORDER — METHYLPREDNISOLONE 32 MG/1
32 TABLET ORAL DAILY
Qty: 2 TABLET | Refills: 0 | Status: SHIPPED | OUTPATIENT
Start: 2021-01-01 | End: 2021-01-01

## 2021-01-01 RX ORDER — FENTANYL CITRATE 50 UG/ML
25-50 INJECTION, SOLUTION INTRAMUSCULAR; INTRAVENOUS
Status: DISCONTINUED | OUTPATIENT
Start: 2021-01-01 | End: 2021-01-01 | Stop reason: HOSPADM

## 2021-01-01 RX ADMIN — DEXAMETHASONE SODIUM PHOSPHATE 8 MG: 4 INJECTION, SOLUTION INTRA-ARTICULAR; INTRALESIONAL; INTRAMUSCULAR; INTRAVENOUS; SOFT TISSUE at 12:54

## 2021-01-01 RX ADMIN — SODIUM CHLORIDE, POTASSIUM CHLORIDE, SODIUM LACTATE AND CALCIUM CHLORIDE: 600; 310; 30; 20 INJECTION, SOLUTION INTRAVENOUS at 12:10

## 2021-01-01 RX ADMIN — PANTOPRAZOLE SODIUM 40 MG: 40 TABLET, DELAYED RELEASE ORAL at 08:57

## 2021-01-01 RX ADMIN — FENTANYL CITRATE 50 MCG: 50 INJECTION, SOLUTION INTRAMUSCULAR; INTRAVENOUS at 18:09

## 2021-01-01 RX ADMIN — ROCURONIUM BROMIDE 20 MG: 10 INJECTION INTRAVENOUS at 13:40

## 2021-01-01 RX ADMIN — FENTANYL CITRATE 100 MCG: 50 INJECTION, SOLUTION INTRAMUSCULAR; INTRAVENOUS at 12:56

## 2021-01-01 RX ADMIN — PHENYLEPHRINE HYDROCHLORIDE 100 MCG: 10 INJECTION INTRAVENOUS at 13:49

## 2021-01-01 RX ADMIN — ACETAMINOPHEN 1000 MG: 500 TABLET, FILM COATED ORAL at 16:37

## 2021-01-01 RX ADMIN — DOBUTAMINE HYDROCHLORIDE 10 MCG/KG/MIN: 200 INJECTION INTRAVENOUS at 12:33

## 2021-01-01 RX ADMIN — SODIUM CHLORIDE, POTASSIUM CHLORIDE, SODIUM LACTATE AND CALCIUM CHLORIDE: 600; 310; 30; 20 INJECTION, SOLUTION INTRAVENOUS at 15:37

## 2021-01-01 RX ADMIN — ATORVASTATIN CALCIUM 40 MG: 40 TABLET, FILM COATED ORAL at 08:18

## 2021-01-01 RX ADMIN — PERFLUTREN 6 ML: 6.52 INJECTION, SUSPENSION INTRAVENOUS at 12:46

## 2021-01-01 RX ADMIN — PHENYLEPHRINE HYDROCHLORIDE 50 MCG: 10 INJECTION INTRAVENOUS at 14:21

## 2021-01-01 RX ADMIN — ENOXAPARIN SODIUM 40 MG: 40 INJECTION, SOLUTION INTRAVENOUS; SUBCUTANEOUS at 11:12

## 2021-01-01 RX ADMIN — FENTANYL CITRATE 150 MCG: 50 INJECTION, SOLUTION INTRAMUSCULAR; INTRAVENOUS at 11:52

## 2021-01-01 RX ADMIN — FENTANYL CITRATE 50 MCG: 50 INJECTION, SOLUTION INTRAMUSCULAR; INTRAVENOUS at 14:02

## 2021-01-01 RX ADMIN — DOCUSATE SODIUM 50 MG AND SENNOSIDES 8.6 MG 1 TABLET: 8.6; 5 TABLET, FILM COATED ORAL at 08:18

## 2021-01-01 RX ADMIN — ACETAMINOPHEN 1000 MG: 500 TABLET, FILM COATED ORAL at 11:05

## 2021-01-01 RX ADMIN — ENOXAPARIN SODIUM 40 MG: 40 INJECTION, SOLUTION INTRAVENOUS; SUBCUTANEOUS at 11:05

## 2021-01-01 RX ADMIN — PHENYLEPHRINE HYDROCHLORIDE 100 MCG: 10 INJECTION INTRAVENOUS at 16:23

## 2021-01-01 RX ADMIN — ROCURONIUM BROMIDE 30 MG: 10 INJECTION INTRAVENOUS at 14:54

## 2021-01-01 RX ADMIN — POLYETHYLENE GLYCOL 3350 17 G: 17 POWDER, FOR SOLUTION ORAL at 08:18

## 2021-01-01 RX ADMIN — METOPROLOL TARTRATE 4 MG: 1 INJECTION, SOLUTION INTRAVENOUS at 12:44

## 2021-01-01 RX ADMIN — Medication 5 MG: at 12:00

## 2021-01-01 RX ADMIN — Medication 10 MG: at 11:52

## 2021-01-01 RX ADMIN — FENTANYL CITRATE 50 MCG: 50 INJECTION, SOLUTION INTRAMUSCULAR; INTRAVENOUS at 14:47

## 2021-01-01 RX ADMIN — FENTANYL CITRATE 50 MCG: 50 INJECTION, SOLUTION INTRAMUSCULAR; INTRAVENOUS at 13:31

## 2021-01-01 RX ADMIN — HYDROMORPHONE HYDROCHLORIDE 0.5 MG: 1 INJECTION, SOLUTION INTRAMUSCULAR; INTRAVENOUS; SUBCUTANEOUS at 17:03

## 2021-01-01 RX ADMIN — ROCURONIUM BROMIDE 20 MG: 10 INJECTION INTRAVENOUS at 16:05

## 2021-01-01 RX ADMIN — PROPOFOL 30 MG: 10 INJECTION, EMULSION INTRAVENOUS at 12:49

## 2021-01-01 RX ADMIN — SODIUM CHLORIDE, POTASSIUM CHLORIDE, SODIUM LACTATE AND CALCIUM CHLORIDE: 600; 310; 30; 20 INJECTION, SOLUTION INTRAVENOUS at 18:41

## 2021-01-01 RX ADMIN — Medication 0.2 MCG/KG/MIN: at 12:40

## 2021-01-01 RX ADMIN — PROPOFOL 100 MG: 10 INJECTION, EMULSION INTRAVENOUS at 12:25

## 2021-01-01 RX ADMIN — ROCURONIUM BROMIDE 100 MG: 10 INJECTION INTRAVENOUS at 11:52

## 2021-01-01 RX ADMIN — PHENYLEPHRINE HYDROCHLORIDE 100 MCG: 10 INJECTION INTRAVENOUS at 14:00

## 2021-01-01 RX ADMIN — PROPOFOL 120 MG: 10 INJECTION, EMULSION INTRAVENOUS at 11:52

## 2021-01-01 RX ADMIN — Medication 10 MG: at 12:12

## 2021-01-01 RX ADMIN — FENTANYL CITRATE 50 MCG: 50 INJECTION, SOLUTION INTRAMUSCULAR; INTRAVENOUS at 18:41

## 2021-01-01 RX ADMIN — PHENYLEPHRINE HYDROCHLORIDE 100 MCG: 10 INJECTION INTRAVENOUS at 14:02

## 2021-01-01 RX ADMIN — CLINDAMYCIN PHOSPHATE 900 MG: 900 INJECTION, SOLUTION INTRAVENOUS at 12:35

## 2021-01-01 RX ADMIN — SUGAMMADEX 200 MG: 100 INJECTION, SOLUTION INTRAVENOUS at 17:26

## 2021-01-01 RX ADMIN — PROPOFOL 30 MG: 10 INJECTION, EMULSION INTRAVENOUS at 11:56

## 2021-01-01 RX ADMIN — DOCUSATE SODIUM 50 MG AND SENNOSIDES 8.6 MG 1 TABLET: 8.6; 5 TABLET, FILM COATED ORAL at 21:53

## 2021-01-01 RX ADMIN — FENTANYL CITRATE 100 MCG: 50 INJECTION, SOLUTION INTRAMUSCULAR; INTRAVENOUS at 14:10

## 2021-01-01 RX ADMIN — PROPOFOL 50 MG: 10 INJECTION, EMULSION INTRAVENOUS at 12:05

## 2021-01-01 RX ADMIN — PROPOFOL 70 MG: 10 INJECTION, EMULSION INTRAVENOUS at 12:22

## 2021-01-01 RX ADMIN — PANTOPRAZOLE SODIUM 40 MG: 40 TABLET, DELAYED RELEASE ORAL at 08:21

## 2021-01-01 RX ADMIN — ROCURONIUM BROMIDE 30 MG: 10 INJECTION INTRAVENOUS at 12:50

## 2021-01-01 RX ADMIN — ATROPINE SULFATE 0.4 MG: 0.4 INJECTION, SOLUTION INTRAMUSCULAR; INTRAVENOUS; SUBCUTANEOUS at 12:40

## 2021-01-01 RX ADMIN — LIDOCAINE HYDROCHLORIDE 100 MG: 20 INJECTION, SOLUTION INFILTRATION; PERINEURAL at 11:52

## 2021-01-01 RX ADMIN — ACETAMINOPHEN 1000 MG: 500 TABLET, FILM COATED ORAL at 22:44

## 2021-01-01 RX ADMIN — ATORVASTATIN CALCIUM 40 MG: 40 TABLET, FILM COATED ORAL at 08:57

## 2021-01-01 RX ADMIN — ACETAMINOPHEN 1000 MG: 500 TABLET, FILM COATED ORAL at 21:53

## 2021-01-01 RX ADMIN — FLUDEOXYGLUCOSE F-18 10.61 MCI.: 500 INJECTION, SOLUTION INTRAVENOUS at 10:52

## 2021-01-01 RX ADMIN — IBUPROFEN 400 MG: 400 TABLET, FILM COATED ORAL at 20:53

## 2021-01-01 RX ADMIN — PHENYLEPHRINE HYDROCHLORIDE 50 MCG: 10 INJECTION INTRAVENOUS at 17:02

## 2021-01-01 RX ADMIN — IBUPROFEN 400 MG: 400 TABLET, FILM COATED ORAL at 14:30

## 2021-01-01 RX ADMIN — SODIUM CHLORIDE, POTASSIUM CHLORIDE, SODIUM LACTATE AND CALCIUM CHLORIDE: 600; 310; 30; 20 INJECTION, SOLUTION INTRAVENOUS at 11:37

## 2021-01-01 RX ADMIN — ONDANSETRON 4 MG: 2 INJECTION INTRAMUSCULAR; INTRAVENOUS at 17:13

## 2021-01-01 ASSESSMENT — ACTIVITIES OF DAILY LIVING (ADL)
ADLS_ACUITY_SCORE: 15
ADLS_ACUITY_SCORE: 17
ADLS_ACUITY_SCORE: 16
ADLS_ACUITY_SCORE: 14
ADLS_ACUITY_SCORE: 15
ADLS_ACUITY_SCORE: 16
ADLS_ACUITY_SCORE: 15
PREVIOUS_RESPONSIBILITIES: MEAL PREP;HOUSEKEEPING;MEDICATION MANAGEMENT;FINANCES
ADLS_ACUITY_SCORE: 16
ADLS_ACUITY_SCORE: 15
ADLS_ACUITY_SCORE: 15

## 2021-01-01 ASSESSMENT — COPD QUESTIONNAIRES: COPD: 1

## 2021-01-01 ASSESSMENT — MIFFLIN-ST. JEOR
SCORE: 1572.38
SCORE: 1603.18
SCORE: 1580.96
SCORE: 1611.35

## 2021-01-01 ASSESSMENT — PAIN SCALES - GENERAL: PAINLEVEL: NO PAIN (0)

## 2021-01-01 ASSESSMENT — LIFESTYLE VARIABLES: TOBACCO_USE: 1

## 2021-01-01 ASSESSMENT — ENCOUNTER SYMPTOMS: SEIZURES: 0

## 2021-03-10 ENCOUNTER — IMMUNIZATION (OUTPATIENT)
Dept: PEDIATRICS | Facility: CLINIC | Age: 66
End: 2021-03-10
Payer: COMMERCIAL

## 2021-03-10 PROCEDURE — 91300 PR COVID VAC PFIZER DIL RECON 30 MCG/0.3 ML IM: CPT

## 2021-03-10 PROCEDURE — 0001A PR COVID VAC PFIZER DIL RECON 30 MCG/0.3 ML IM: CPT

## 2021-03-21 ASSESSMENT — ENCOUNTER SYMPTOMS
SHORTNESS OF BREATH: 0
EYE PAIN: 0
HEARTBURN: 0
NAUSEA: 0
MYALGIAS: 1
DIZZINESS: 0
HEMATURIA: 0
SORE THROAT: 0
CHILLS: 0
COUGH: 0
CONSTIPATION: 0
PARESTHESIAS: 0
NERVOUS/ANXIOUS: 0
HEADACHES: 0
DIARRHEA: 0
JOINT SWELLING: 0
PALPITATIONS: 0
DYSURIA: 0
FEVER: 0
FREQUENCY: 1
HEMATOCHEZIA: 0
ABDOMINAL PAIN: 0
WEAKNESS: 0
ARTHRALGIAS: 1

## 2021-03-21 ASSESSMENT — ACTIVITIES OF DAILY LIVING (ADL): CURRENT_FUNCTION: NO ASSISTANCE NEEDED

## 2021-03-24 ENCOUNTER — OFFICE VISIT (OUTPATIENT)
Dept: FAMILY MEDICINE | Facility: CLINIC | Age: 66
End: 2021-03-24
Payer: COMMERCIAL

## 2021-03-24 VITALS
HEART RATE: 69 BPM | TEMPERATURE: 98.8 F | DIASTOLIC BLOOD PRESSURE: 73 MMHG | WEIGHT: 167 LBS | BODY MASS INDEX: 24.73 KG/M2 | HEIGHT: 69 IN | SYSTOLIC BLOOD PRESSURE: 127 MMHG

## 2021-03-24 DIAGNOSIS — M25.50 MULTIPLE JOINT PAIN: ICD-10-CM

## 2021-03-24 DIAGNOSIS — F10.20 ETOHISM (H): ICD-10-CM

## 2021-03-24 DIAGNOSIS — L98.9 CHANGING SKIN LESION: ICD-10-CM

## 2021-03-24 DIAGNOSIS — F33.1 MAJOR DEPRESSIVE DISORDER, RECURRENT EPISODE, MODERATE (H): ICD-10-CM

## 2021-03-24 DIAGNOSIS — Z13.6 SCREENING FOR AAA (ABDOMINAL AORTIC ANEURYSM): ICD-10-CM

## 2021-03-24 DIAGNOSIS — J44.9 CHRONIC OBSTRUCTIVE PULMONARY DISEASE, UNSPECIFIED COPD TYPE (H): ICD-10-CM

## 2021-03-24 DIAGNOSIS — N39.44 NOCTURNAL ENURESIS: ICD-10-CM

## 2021-03-24 DIAGNOSIS — R35.0 URINARY FREQUENCY: ICD-10-CM

## 2021-03-24 DIAGNOSIS — I10 BENIGN ESSENTIAL HYPERTENSION: ICD-10-CM

## 2021-03-24 DIAGNOSIS — R97.20 ELEVATED PROSTATE SPECIFIC ANTIGEN (PSA): ICD-10-CM

## 2021-03-24 DIAGNOSIS — Z87.891 PERSONAL HISTORY OF TOBACCO USE: ICD-10-CM

## 2021-03-24 DIAGNOSIS — I50.32 CHRONIC DIASTOLIC HEART FAILURE (H): ICD-10-CM

## 2021-03-24 DIAGNOSIS — Z00.00 ENCOUNTER FOR MEDICARE ANNUAL WELLNESS EXAM: Primary | ICD-10-CM

## 2021-03-24 DIAGNOSIS — Z12.5 SCREENING FOR PROSTATE CANCER: ICD-10-CM

## 2021-03-24 PROCEDURE — 99213 OFFICE O/P EST LOW 20 MIN: CPT | Mod: 25 | Performed by: PHYSICIAN ASSISTANT

## 2021-03-24 PROCEDURE — G0296 VISIT TO DETERM LDCT ELIG: HCPCS | Performed by: PHYSICIAN ASSISTANT

## 2021-03-24 PROCEDURE — 99397 PER PM REEVAL EST PAT 65+ YR: CPT | Mod: 25 | Performed by: PHYSICIAN ASSISTANT

## 2021-03-24 RX ORDER — MULTIVIT WITH MINERALS/LUTEIN
250 TABLET ORAL EVERY MORNING
COMMUNITY
End: 2022-01-01

## 2021-03-24 ASSESSMENT — ENCOUNTER SYMPTOMS
NERVOUS/ANXIOUS: 0
HEARTBURN: 0
DIZZINESS: 0
COUGH: 0
MYALGIAS: 1
HEADACHES: 0
SHORTNESS OF BREATH: 0
HEMATURIA: 0
CHILLS: 0
WEAKNESS: 0
ABDOMINAL PAIN: 0
FEVER: 0
FREQUENCY: 1
CONSTIPATION: 0
EYE PAIN: 0
DIARRHEA: 0
SORE THROAT: 0
NAUSEA: 0
PARESTHESIAS: 0
DYSURIA: 0
JOINT SWELLING: 0
PALPITATIONS: 0
HEMATOCHEZIA: 0
ARTHRALGIAS: 1

## 2021-03-24 ASSESSMENT — ANXIETY QUESTIONNAIRES
5. BEING SO RESTLESS THAT IT IS HARD TO SIT STILL: NOT AT ALL
GAD7 TOTAL SCORE: 0
6. BECOMING EASILY ANNOYED OR IRRITABLE: NOT AT ALL
1. FEELING NERVOUS, ANXIOUS, OR ON EDGE: NOT AT ALL
7. FEELING AFRAID AS IF SOMETHING AWFUL MIGHT HAPPEN: NOT AT ALL
3. WORRYING TOO MUCH ABOUT DIFFERENT THINGS: NOT AT ALL
IF YOU CHECKED OFF ANY PROBLEMS ON THIS QUESTIONNAIRE, HOW DIFFICULT HAVE THESE PROBLEMS MADE IT FOR YOU TO DO YOUR WORK, TAKE CARE OF THINGS AT HOME, OR GET ALONG WITH OTHER PEOPLE: NOT DIFFICULT AT ALL
2. NOT BEING ABLE TO STOP OR CONTROL WORRYING: NOT AT ALL

## 2021-03-24 ASSESSMENT — PATIENT HEALTH QUESTIONNAIRE - PHQ9
SUM OF ALL RESPONSES TO PHQ QUESTIONS 1-9: 1
5. POOR APPETITE OR OVEREATING: NOT AT ALL

## 2021-03-24 ASSESSMENT — MIFFLIN-ST. JEOR: SCORE: 1527.89

## 2021-03-24 ASSESSMENT — ACTIVITIES OF DAILY LIVING (ADL): CURRENT_FUNCTION: NO ASSISTANCE NEEDED

## 2021-03-24 NOTE — PATIENT INSTRUCTIONS
Patient Education   Personalized Prevention Plan  You are due for the preventive services outlined below.  Your care team is available to assist you in scheduling these services.  If you have already completed any of these items, please share that information with your care team to update in your medical record.  Health Maintenance Due   Topic Date Due     Zoster (Shingles) Vaccine (2 of 3) 05/31/2014     Diptheria Tetanus Pertussis (DTAP/TDAP/TD) Vaccine (2 - Td) 10/08/2019     Pneumococcal Vaccine (2 of 2) 03/20/2020     Pneumococcal Vaccine (2 of 2) 03/20/2020     AORTIC ANEURYSM SCREENING (SYSTEM ASSIGNED)  Never done     Annual Wellness Visit  05/17/2020     Lung Cancer Screening (CT Scan)  06/07/2020     Flu Vaccine (1) 09/01/2020     Depression Assessment  10/10/2020     COVID-19 Vaccine (2 - Pfizer 2-dose series) 03/31/2021        Lung Cancer Screening   Frequently Asked Questions  If you are at high-risk for lung cancer, getting screened with low-dose computed tomography (LDCT) every year can help save your life. This handout offers answers to some of the most common questions about lung cancer screening. If you have other questions, please call [phone] ([phone]).     What is it?  Lung cancer screening uses special X-ray technology to create an image of your lung tissue. The exam is quick and easy and takes less than 10 seconds. We don t give you any medicine or use any needles. You can eat before and after the exam. You don t need to change your clothes as long as the clothing on your chest doesn t contain metal. But, you do need to be able to hold your breath for at least 6 seconds during the exam.    What is the goal of lung cancer screening?  The goal of lung cancer screening is to save lives. Many times, lung cancer is not found until a person starts having physical symptoms. Lung cancer screening can help detect lung cancer in the earliest stages when it may be easier to  treat.    Who should be screened for lung cancer?  We suggest lung cancer screening for anyone who is at high-risk for lung cancer. You are in the high-risk group if you:      are between the ages of 55 and 79, and    have smoked at least 1 pack of cigarettes a day for 30 or more years, and    still smoke or have quit within the past 15 years.    However, if you have a new cough or shortness of breath, you should talk to your doctor before being screened.    Some national lung health advocacy groups also recommend screening for people ages 50 to 79 who have smoked an average of 1 pack of cigarettes a day for 20 years. They must also have at least 1 other risk factor for lung cancer, not including exposure to secondhand smoke. Other risk factors are having had cancer in the past, emphysema, pulmonary fibrosis, COPD, a family history of lung cancer, or exposure to certain materials such as arsenic, asbestos, beryllium, cadmium, chromium, diesel fumes, nickel, radon or silica. Your care team can help you know if you have one of these risk factors.     Why does it matter if I have symptoms?  Certain symptoms can be a sign that you have a condition in your lungs that should be checked and treated by your doctor. These symptoms include fever, chest pain, a new or changing cough, shortness of breath that you have never felt before, coughing up blood or unexplained weight loss. Having any of these symptoms can greatly affect the results of lung cancer screening.       Should all smokers get an LDCT lung cancer screening exam?  It depends. Lung cancer screening is for a very specific group of men and women who have a history of heavy smoking over a long period of time (see  Who should be screened for lung cancer  above).  I am in the high-risk group, but have been diagnosed with cancer in the past. Is LDCT lung cancer screening right for me?  In some cases, you should not have LDCT lung screening, such as when your doctor is  already following your cancer with CT scan studies. Your doctor will help you decide if LDCT lung screening is right for you.  Do I need to have a screening exam every year?  Yes. If you are in the high-risk group described earlier, you should get an LDCT lung cancer screening exam every year until you are 79, or are no longer willing or able to undergo screening and possible procedures to diagnose and treat lung cancer.  How effective is LDCT at preventing death from lung cancer?  Studies have shown that LDCT lung cancer screening can lower the risk of death from lung cancer by 20 percent in people who are at high-risk.  What are the risks?  There are some risks and limitations of LDCT lung cancer screening. We want to make sure you understand the risks and benefits, so please let us know if you have any questions. Your doctor may want to talk with you more about these risks.    Radiation exposure: As with any exam that uses radiation, there is a very small increased risk of cancer. The amount of radiation in LDCT is small--about the same amount a person would get from a mammogram. Your doctor orders the exam when he or she feels the potential benefits outweigh the risks.    False negatives: No test is perfect, including LDCT. It is possible that you may have a medical condition, including lung cancer, that is not found during your exam. This is called a false negative result.    False positives and more testing: LDCT very often finds something in the lung that could be cancer, but in fact is not. This is called a false positive result. False positive tests often cause anxiety. To make sure these findings are not cancer, you may need to have more tests. These tests will be done only if you give us permission. Sometimes patients need a treatment that can have side effects, such as a biopsy. For more information on false positives, see  What can I expect from the results?     Findings not related to lung cancer: Your  LDCT exam also takes pictures of areas of your body next to your lungs. In a very small number of cases, the CT scan will show an abnormal finding in one of these areas, such as your kidneys, adrenal glands, liver or thyroid. This finding may not be serious, but you may need more tests. Your doctor can help you decide what other tests you may need, if any.  What can I expect from the results?  About 1 out of 4 LDCT exams will find something that may need more tests. Most of the time, these findings are lung nodules. Lung nodules are very small collections of tissue in the lung. These nodules are very common, and the vast majority--more than 97 percent--are not cancer (benign). Most are normal lymph nodes or small areas of scarring from past infections.  But, if a small lung nodule is found to be cancer, the cancer can be cured more than 90 percent of the time. To know if the nodule is cancer, we may need to get more images before your next yearly screening exam. If the nodule has suspicious features (for example, it is large, has an odd shape or grows over time), we will refer you to a specialist for further testing.  Will my doctor also get the results?  Yes. Your doctor will get a copy of your results.  Is it okay to keep smoking now that there s a cancer screening exam?  No. Tobacco is one of the strongest cancer-causing agents. It causes not only lung cancer, but other cancers and cardiovascular (heart) diseases as well. The damage caused by smoking builds over time. This means that the longer you smoke, the higher your risk of disease. While it is never too late to quit, the sooner you quit, the better.  Where can I find help to quit smoking?  The best way to prevent lung cancer is to stop smoking. If you have already quit smoking, congratulations and keep it up! For help on quitting smoking, please call QUIT[employer]AN at 9-589-761-UTBA ([phone]) or the American Cancer Society at 1-478.673.8900 to find local resources  near you.  One-on-one health coaching:  If you d prefer to work individually with a health care provider on tobacco cessation, we offer:      Medication Therapy Management:  Our specially trained pharmacists work closely with you and your doctor to help you quit smoking.  Call [phone] or [phone] (toll free).     Can Do: Health coaching offered by [facility].  [email or web address]

## 2021-03-24 NOTE — PROGRESS NOTES
"SUBJECTIVE:   Kailash Murray is a 66 year old male who presents for Preventive Visit.      Patient has been advised of split billing requirements and indicates understanding: Yes   Are you in the first 12 months of your Medicare coverage?  No    Healthy Habits:     In general, how would you rate your overall health?  Fair    Frequency of exercise:  None    Do you usually eat at least 4 servings of fruit and vegetables a day, include whole grains    & fiber and avoid regularly eating high fat or \"junk\" foods?  No    Taking medications regularly:  Yes    Medication side effects:  None    Ability to successfully perform activities of daily living:  No assistance needed    Home Safety:  No safety concerns identified    Hearing Impairment:  No hearing concerns    In the past 6 months, have you been bothered by leaking of urine? Yes    In general, how would you rate your overall mental or emotional health?  Excellent      PHQ-2 Total Score: 0    Additional concerns today:  Yes    Hasn't had a drink since Nov 2020.  No alcohol cravings but now craves sweets.  Hx HF-last echo in 2011.  No swelling, chest pain, shortness of breath.  Some new weight gain but he is recently retired from and active job and has not been active and is now eating more sugar.      Cut back on smoking.  No cough.    Overall body aches.  Mostly in hips and legs        Do you feel safe in your environment? No    Have you ever done Advance Care Planning? (For example, a Health Directive, POLST, or a discussion with a medical provider or your loved ones about your wishes): No, advance care planning information given to patient to review.  Patient plans to discuss their wishes with loved ones or provider.        Fall risk  Fall Risk Assessment not completed.    Cognitive Screening   1) Repeat 3 items (Leader, Season, Table) yes    2) Clock draw: NORMAL  3) 3 item recall: Recalls 3 objects  Results: 3 items recalled: COGNITIVE IMPAIRMENT LESS " LIKELY    Mini-CogTM Copyright SHAKIR Jason. Licensed by the author for use in [facility]; reprinted with permission (renetta@[facility]). All rights reserved.      Do you have sleep apnea, excessive snoring or daytime drowsiness?: no    Reviewed and updated as needed this visit by clinical staff  Tobacco  Allergies  Meds   Med Hx  Surg Hx  Fam Hx  Soc Hx        Reviewed and updated as needed this visit by Provider   Allergies  Meds             Social History     Tobacco Use     Smoking status: Current Every Day Smoker     Packs/day: 1.00     Years: 40.00     Pack years: 40.00     Types: Cigarettes     Start date: 3/20/1972     Smokeless tobacco: Never Used   Substance Use Topics     Alcohol use: Not Currently     Alcohol/week: 14.0 standard drinks     Types: 14 Shots of liquor per week     Comment: history of abuse      If you drink alcohol do you typically have >3 drinks per day or >7 drinks per week? No      AUDIT - Alcohol Use Disorders Identification Test - Reproduced from the World Health Organization Audit 2001 (Second Edition) 5/17/2019   1.  How often do you have a drink containing alcohol? 4 or more times a week   2.  How many drinks containing alcohol do you have on a typical day when you are drinking? 1 or 2   3.  How often do you have five or more drinks on one occasion? Never   4.  How often during the last year have you found that you were not able to stop drinking once you had started? Never   5.  How often during the last year have you failed to do what was normally expected of you because of drinking? Never   6.  How often during the last year have you needed a first drink in the morning to get yourself going after a heavy drinking session? Never   7.  How often during the last year have you had a feeling of guilt or remorse after drinking? Never   8.  How often during the last year have you been unable to remember what happened the night before because of your drinking? Never   9.  Have  you or someone else been injured because of your drinking? No   10. Has a relative, friend, doctor or other health care worker been concerned about your drinking or suggested you cut down? Yes, but not in the last year   TOTAL SCORE 6       Check spot on R face-getting bigger,sleeping habits, joints pain     Current providers sharing in care for this patient include:   Patient Care Team:  Carolyne Cornejo MD as PCP - General (Family Practice)  Carolyne Cornejo MD as Assigned PCP    The following health maintenance items are reviewed in Epic and correct as of today:  Health Maintenance   Topic Date Due     ZOSTER IMMUNIZATION (2 of 3) 05/31/2014     Pneumococcal Vaccine: Pediatrics (0 to 5 Years) and At-Risk Patients (6 to 64 Years) (2 of 2) 03/20/2020     Pneumococcal Vaccine: 65+ Years (2 of 2) 03/20/2020     AORTIC ANEURYSM SCREENING (SYSTEM ASSIGNED)  Never done     LUNG CANCER SCREENING ANNUAL  06/07/2020     INFLUENZA VACCINE (1) 09/01/2020     PHQ-9  10/10/2020     COVID-19 Vaccine (2 - Pfizer 2-dose series) 03/31/2021     FALL RISK ASSESSMENT  08/05/2021     MEDICARE ANNUAL WELLNESS VISIT  03/24/2022     COLORECTAL CANCER SCREENING  06/08/2023     LIPID  05/10/2024     ADVANCE CARE PLANNING  03/24/2026     DTAP/TDAP/TD IMMUNIZATION (4 - Td) 07/31/2030     SPIROMETRY  Completed     HEPATITIS C SCREENING  Completed     COPD ACTION PLAN  Completed     DEPRESSION ACTION PLAN  Completed     IPV IMMUNIZATION  Aged Out     MENINGITIS IMMUNIZATION  Aged Out     HEPATITIS B IMMUNIZATION  Aged Out     Labs reviewed in EPIC      Review of Systems   Constitutional: Negative for chills and fever.   HENT: Negative for congestion, ear pain, hearing loss and sore throat.    Eyes: Negative for pain and visual disturbance.   Respiratory: Negative for cough and shortness of breath.    Cardiovascular: Negative for chest pain, palpitations and peripheral edema.   Gastrointestinal: Negative for abdominal pain,  "constipation, diarrhea, heartburn, hematochezia and nausea.   Genitourinary: Positive for frequency (incontience, some dribbling at end) and urgency. Negative for discharge, dysuria, genital sores, hematuria and impotence.   Musculoskeletal: Positive for arthralgias and myalgias. Negative for joint swelling.   Skin: Negative for rash.        Skin lesion on face getting bigger    Neurological: Negative for dizziness, weakness, headaches and paresthesias.   Psychiatric/Behavioral: Negative for mood changes. The patient is not nervous/anxious.          OBJECTIVE:   /73   Pulse 69   Temp 98.8  F (37.1  C) (Tympanic)   Ht 1.753 m (5' 9\")   Wt 75.8 kg (167 lb)   BMI 24.66 kg/m   Estimated body mass index is 24.66 kg/m  as calculated from the following:    Height as of this encounter: 1.753 m (5' 9\").    Weight as of this encounter: 75.8 kg (167 lb).  Physical Exam  Constitutional:       General: He is not in acute distress.     Appearance: He is well-developed.   HENT:      Right Ear: Tympanic membrane, ear canal and external ear normal.      Left Ear: Tympanic membrane, ear canal and external ear normal.      Mouth/Throat:      Pharynx: No oropharyngeal exudate.   Eyes:      Pupils: Pupils are equal, round, and reactive to light.   Neck:      Thyroid: No thyromegaly.   Cardiovascular:      Rate and Rhythm: Normal rate and regular rhythm.      Heart sounds: Normal heart sounds.   Pulmonary:      Effort: Pulmonary effort is normal.      Breath sounds: Normal breath sounds.   Abdominal:      General: Bowel sounds are normal.      Palpations: Abdomen is soft.      Tenderness: There is no abdominal tenderness.   Genitourinary:     Comments: Normal testicles, no hernias or masses   Musculoskeletal:      Comments: No pain in low back  Good and equal strength bilaterally in LE   Lymphadenopathy:      Cervical: No cervical adenopathy.   Skin:     General: Skin is warm and dry.      Findings: Lesion (hyperpigmented " macular lesion on left upper cheek ) present.   Neurological:      Mental Status: He is alert and oriented to person, place, and time.      Deep Tendon Reflexes: Reflexes normal.      Reflex Scores:       Patellar reflexes are 2+ on the right side and 2+ on the left side.  Psychiatric:         Behavior: Behavior normal.           Diagnostic Test Results:  Labs reviewed in Epic    ASSESSMENT / PLAN:   1. Encounter for Medicare annual wellness exam  Follow up when labs are back.  He will return fasting   - Lipid panel reflex to direct LDL Fasting; Future  - **Comprehensive metabolic panel FUTURE anytime; Future  - **CBC with platelets FUTURE anytime; Future    2. Chronic obstructive pulmonary disease, unspecified COPD type (H)  Hasn't needed inhaler.  Working on cutting back     3. Chronic diastolic heart failure (H)  Per cardiology note in 2011:  Assessment and Plan:  1. Heart failure with preserved ejection fraction. The patient has probable multifactorial heart failure related to his poorly controlled hypertension and alcohol abuse. The patient is trying to abstain from alcohol and blood pressure is better controlled. It has been three months since initiation of medical therapy and we will plan to obtain repeat echocardiogram. Most of the time, the patient is reportedly is completely asymptomatic from a heart failure standpoint except when he eats salt rich foods. I advised the patient to adhere to a no added salt diet and his PND does not resolved with that in a couple of days or if it is getting worse, let us know.   2. Hypertension, adequately controlled at present. He was initially started on Coreg and for some reason it was discontinued. He is currently on antihypertensive agents that I would not choose to control blood pressure given his mildly reduced ejection fraction. I would rather prefer to use Coreg. He is adequately controlled now and will continue current strategy and we will reevaluate ejection  fraction. If his ejection fraction is reduced more than 50%, definitely switch to Coreg and an ACE inhibitor .  3. Smoking. I advised the patient to strongly quit smoking. The patient understands the cardiovascular morbidities/mortality associated with it. I advised the patient to seek professional help and quit smoking.   4. Alcohol abuse. I advised the patient to abstain from alcohol.   5. Dyslipidemia. Goal LDL is below 70 given extensive coronary artery calcification, but no obvious obstructive (more than 50% disease). The patient has no cardiorespiratory symptoms with exertion and no PND if he avoids salty foods so we will adhere to conservative aggressive medical management with no further ischemic evaluation at present.   Now blood pressure is at goal.  He has stopped drinking.  Should get repeat Echo in future.  Did talk to pt about probably needed to restart statin given his hx of CAD.  He is currently asymptomatic.        4. ETOHism (H)  Sober since Nov.  Doing well.      5. Major depressive disorder, recurrent episode, moderate (H)  No concerns now     6. Personal history of tobacco use    - Prof fee: Shared Decisionmaking for Lung Cancer Screening  - CT Chest Lung Cancer Scrn Low Dose wo; Future    7. Multiple joint pain  Likely due to his previous job and now inactive.  Get back to exercise   - Vitamin D Deficiency; Future    8. Urinary frequency  Follow up as needed  - PSA, total and free; Future      10. Elevated prostate specific antigen (PSA)       11. Screening for AAA (abdominal aortic aneurysm)  Doesn't appear insurance will cover.  Will address in future.      12. Nocturnal enuresis  As above  - PSA, total and free; Future    13. Screening for prostate cancer    - PSA, total and free; Future    15. Changing skin lesion  See derm   - DERMATOLOGY ADULT REFERRAL; Future    Patient has been advised of split billing requirements and indicates understanding: Yes  COUNSELING:  Reviewed preventive  "health counseling, as reflected in patient instructions       Consider AAA screening for ages 65-75 and smoking history       Regular exercise       Healthy diet/nutrition       Prostate cancer screening    Estimated body mass index is 24.66 kg/m  as calculated from the following:    Height as of this encounter: 1.753 m (5' 9\").    Weight as of this encounter: 75.8 kg (167 lb).        He reports that he has been smoking cigarettes. He started smoking about 49 years ago. He has a 40.00 pack-year smoking history. He has never used smokeless tobacco.  Tobacco Cessation Action Plan:   Information offered: Patient not interested at this time      Appropriate preventive services were discussed with this patient, including applicable screening as appropriate for cardiovascular disease, diabetes, osteopenia/osteoporosis, and glaucoma.  As appropriate for age/gender, discussed screening for colorectal cancer, prostate cancer, breast cancer, and cervical cancer. Checklist reviewing preventive services available has been given to the patient.    Reviewed patients plan of care and provided an AVS. The Basic Care Plan (routine screening as documented in Health Maintenance) for Kailash meets the Care Plan requirement. This Care Plan has been established and reviewed with the Patient.    Counseling Resources:  ATP IV Guidelines  Pooled Cohorts Equation Calculator  Breast Cancer Risk Calculator  Breast Cancer: Medication to Reduce Risk  FRAX Risk Assessment  ICSI Preventive Guidelines  Dietary Guidelines for Americans, 2010  [employer]'s MyPlate  ASA Prophylaxis  Lung CA Screening    MARGI Guzman [facility]    Identified Health Risks:  Lung Cancer Screening Shared Decision Making Visit     Kailash Murray is eligible for lung cancer screening on the basis of the information provided in my signed lung cancer screening order.     I have discussed with patient the risks and benefits of screening for lung " cancer with low-dose CT.     The risks include:  radiation exposure: one low dose chest CT has as much ionizing radiation as about 15 chest x-rays or 6 months of background radiation living in [place]    false positives: 96% of positive findings/nodules are NOT cancer, but some might still require additional diagnostic evaluation, including biopsy  over-diagnosis: some slow growing cancers that might never have been clinically significant will be detected and treated unnecessarily     The benefit of early detection of lung cancer is contingent upon adherence to annual screening or more frequent follow up if indicated.     Furthermore, reaping the benefits of screening requires Kailash CASTAN[place] to be willing and physically able to undergo diagnostic procedures, if indicated. Although no specific guide is available for determining severity of comorbidities, it is reasonable to withhold screening in patients who have greater mortality risk from other diseases.     We did discuss that the only way to prevent lung cancer is to not smoke. Smoking cessation counseling was given, duration < 3 minutes.      I did not offer risk estimation using a calculator such as this one:    ShouldIScreen

## 2021-03-25 ASSESSMENT — ANXIETY QUESTIONNAIRES: GAD7 TOTAL SCORE: 0

## 2021-03-29 ENCOUNTER — ANCILLARY PROCEDURE (OUTPATIENT)
Dept: CT IMAGING | Facility: CLINIC | Age: 66
End: 2021-03-29
Attending: PHYSICIAN ASSISTANT
Payer: COMMERCIAL

## 2021-03-29 DIAGNOSIS — Z87.891 PERSONAL HISTORY OF TOBACCO USE: ICD-10-CM

## 2021-03-29 PROCEDURE — 71271 CT THORAX LUNG CANCER SCR C-: CPT | Mod: TC | Performed by: RADIOLOGY

## 2021-03-31 ENCOUNTER — IMMUNIZATION (OUTPATIENT)
Dept: PEDIATRICS | Facility: CLINIC | Age: 66
End: 2021-03-31
Attending: INTERNAL MEDICINE
Payer: COMMERCIAL

## 2021-03-31 PROBLEM — R91.1 LUNG NODULE: Status: ACTIVE | Noted: 2021-03-31

## 2021-03-31 PROCEDURE — 91300 PR COVID VAC PFIZER DIL RECON 30 MCG/0.3 ML IM: CPT

## 2021-03-31 PROCEDURE — 0002A PR COVID VAC PFIZER DIL RECON 30 MCG/0.3 ML IM: CPT

## 2021-04-01 ENCOUNTER — TELEPHONE (OUTPATIENT)
Dept: FAMILY MEDICINE | Facility: CLINIC | Age: 66
End: 2021-04-01

## 2021-04-01 NOTE — TELEPHONE ENCOUNTER
Incidental finding reported from [facility]:  CT chest 03/29/2021  Radiologist noted: Moderate coronary artery calcifications  Report under impression #2    Routing to provider as KEVIN ChinoN RN  [facility], [place]

## 2021-04-06 DIAGNOSIS — M25.50 MULTIPLE JOINT PAIN: ICD-10-CM

## 2021-04-06 DIAGNOSIS — Z00.00 ENCOUNTER FOR MEDICARE ANNUAL WELLNESS EXAM: ICD-10-CM

## 2021-04-06 DIAGNOSIS — R35.0 URINARY FREQUENCY: ICD-10-CM

## 2021-04-06 DIAGNOSIS — N39.44 NOCTURNAL ENURESIS: ICD-10-CM

## 2021-04-06 DIAGNOSIS — Z12.5 SCREENING FOR PROSTATE CANCER: ICD-10-CM

## 2021-04-06 DIAGNOSIS — R91.8 PULMONARY NODULES: ICD-10-CM

## 2021-04-06 LAB
ALBUMIN SERPL-MCNC: 4 G/DL (ref 3.4–5)
ALP SERPL-CCNC: 99 U/L (ref 40–150)
ALT SERPL W P-5'-P-CCNC: 18 U/L (ref 0–70)
ANION GAP SERPL CALCULATED.3IONS-SCNC: 5 MMOL/L (ref 3–14)
AST SERPL W P-5'-P-CCNC: 11 U/L (ref 0–45)
BILIRUB SERPL-MCNC: 0.2 MG/DL (ref 0.2–1.3)
BUN SERPL-MCNC: 18 MG/DL (ref 7–30)
CALCIUM SERPL-MCNC: 9.3 MG/DL (ref 8.5–10.1)
CHLORIDE SERPL-SCNC: 107 MMOL/L (ref 94–109)
CHOLEST SERPL-MCNC: 182 MG/DL
CO2 SERPL-SCNC: 27 MMOL/L (ref 20–32)
CREAT SERPL-MCNC: 0.88 MG/DL (ref 0.66–1.25)
ERYTHROCYTE [DISTWIDTH] IN BLOOD BY AUTOMATED COUNT: 13.3 % (ref 10–15)
GFR SERPL CREATININE-BSD FRML MDRD: 89 ML/MIN/{1.73_M2}
GLUCOSE SERPL-MCNC: 107 MG/DL (ref 70–99)
HCT VFR BLD AUTO: 40.2 % (ref 40–53)
HDLC SERPL-MCNC: 44 MG/DL
HGB BLD-MCNC: 13.5 G/DL (ref 13.3–17.7)
LDLC SERPL CALC-MCNC: 112 MG/DL
MCH RBC QN AUTO: 28.4 PG (ref 26.5–33)
MCHC RBC AUTO-ENTMCNC: 33.6 G/DL (ref 31.5–36.5)
MCV RBC AUTO: 85 FL (ref 78–100)
NONHDLC SERPL-MCNC: 138 MG/DL
PLATELET # BLD AUTO: 225 10E9/L (ref 150–450)
POTASSIUM SERPL-SCNC: 3.8 MMOL/L (ref 3.4–5.3)
PROT SERPL-MCNC: 7.5 G/DL (ref 6.8–8.8)
RBC # BLD AUTO: 4.76 10E12/L (ref 4.4–5.9)
SODIUM SERPL-SCNC: 139 MMOL/L (ref 133–144)
TRIGL SERPL-MCNC: 132 MG/DL
WBC # BLD AUTO: 7.7 10E9/L (ref 4–11)

## 2021-04-06 PROCEDURE — 85027 COMPLETE CBC AUTOMATED: CPT | Performed by: PHYSICIAN ASSISTANT

## 2021-04-06 PROCEDURE — 36415 COLL VENOUS BLD VENIPUNCTURE: CPT | Performed by: PHYSICIAN ASSISTANT

## 2021-04-06 PROCEDURE — 99000 SPECIMEN HANDLING OFFICE-LAB: CPT | Performed by: PHYSICIAN ASSISTANT

## 2021-04-06 PROCEDURE — 84153 ASSAY OF PSA TOTAL: CPT | Mod: 90 | Performed by: PHYSICIAN ASSISTANT

## 2021-04-06 PROCEDURE — 80053 COMPREHEN METABOLIC PANEL: CPT | Performed by: PHYSICIAN ASSISTANT

## 2021-04-06 PROCEDURE — 82306 VITAMIN D 25 HYDROXY: CPT | Performed by: PHYSICIAN ASSISTANT

## 2021-04-06 PROCEDURE — 84154 ASSAY OF PSA FREE: CPT | Mod: 90 | Performed by: PHYSICIAN ASSISTANT

## 2021-04-06 PROCEDURE — 80061 LIPID PANEL: CPT | Performed by: PHYSICIAN ASSISTANT

## 2021-04-07 ENCOUNTER — VIRTUAL VISIT (OUTPATIENT)
Dept: PULMONOLOGY | Facility: CLINIC | Age: 66
End: 2021-04-07
Attending: PHYSICIAN ASSISTANT
Payer: COMMERCIAL

## 2021-04-07 DIAGNOSIS — R91.8 PULMONARY NODULES: Primary | ICD-10-CM

## 2021-04-07 LAB — DEPRECATED CALCIDIOL+CALCIFEROL SERPL-MC: 15 UG/L (ref 20–75)

## 2021-04-07 PROCEDURE — 999N001193 HC VIDEO/TELEPHONE VISIT; NO CHARGE

## 2021-04-07 PROCEDURE — 99204 OFFICE O/P NEW MOD 45 MIN: CPT | Mod: GT | Performed by: INTERNAL MEDICINE

## 2021-04-07 NOTE — PROGRESS NOTES
Kailash is a 66 year old who is being evaluated via a billable video visit.      How would you like to obtain your AVS? MyChart  If the video visit is dropped, the invitation should be resent by: Text to cell phone: [phone]  Will anyone else be joining your video visit? No       DANIELE Coley      Video-Visit Details    Type of service:  Video Visit    Video Time: 12 minutes  Total visit time: 25 minutes    Originating Location (pt. Location): Home    Distant Location (provider location):  [facility] CANCER [facility]     Platform used for Video Visit: [facility]  Interventional Pulmonary Clinic Virtual Visit Note    April 7, 2021    Chief complaint:  Kailash Murray is a 66 year old male seen for   Chief Complaint   Patient presents with     Video Visit     NEW PULMONARY NODULES        Reason for clinic visit / Chief complaint:   Pulmonary nodule    Assessment and Plan:  Indeterminate pulmonary nodule, measuring 8 x 5 mm, spiculated, noncalcified, located in the left upper lobe, new nodule when compared to previous CT scan from December 2019.  Both CT scans (current and /2019 CTs) for for lung cancer screening purposes.  We discussed possible etiologies including primary lung cancer and possible approaches at this point.  The nodule is somewhat in deep location therefore I will discuss with our multidisciplinary group to see if this can be biopsied or considered for resection that may require segmentectomy or lobectomy.  We will call patient back this Friday for further information and consensus from multidisciplinary meeting.  Patient is in agreement with the plan.  COPD, mild based on pulmonary function tests from 2018 revealing FVC of 96% of predicted, FEV1 of 78% of predicted and FEV1 to FVC ratio of 60%.  Patient has no symptoms therefore no inhaler required at this point.  Current smoker, cut down to 1 pack/day has been smoking for 49 years averaging 2 to 3 packs/day.  Former  drinker, quit drinking in November 2020.    History of Present Illness:  This is a 66 years old gentleman with no known pulmonary problems except for mild COPD on PFTs as indicated above but not on any treatment because he has no symptom of shortness of breath.  However he has morning phlegm production which is clear not on a daily basis.  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  He has smoked heavily in the past as indicated above.  He did have a lung cancer screening scan in December 2019 in March 2021 revealing new lesion in the left upper lobe which is concerning for primary lung cancer therefore he was referred to our clinic for further evaluation.       Allergies   Allergen Reactions     Coreg [Carvedilol]      With alcohol caused him to be unresponsive     Dye [Contrast Dye]      Rash and hypotension     Hctz      syncope and dehydration with alcohol use     Paxil [Paroxetine]      SLEEPY     Penicillins Rash     Triple Antibiotic Rash        Past Medical History:   Diagnosis Date     Adenomatous polyp 5-2013    needs colonsocpy every 5 years     Alcohol dependence (H)      Asthma      Cervical radiculopathy      CHF (congestive heart failure) (H) 1-2011     COPD (chronic obstructive pulmonary disease) (H)      Coronary artery calcification      DDD (degenerative disc disease), cervical      DDD (degenerative disc disease), lumbar      DDD (degenerative disc disease), lumbosacral      Dupuytren's contracture 3/2/2015     Dyslipidemia      Elevated fasting glucose      ETOHism (H)      HTN (hypertension)      Lumbar spinal stenosis      Moderate major depression (H) 11/9/2011     Renal insufficiency      Trigger finger, acquired 3/2/2015        Past Surgical History:   Procedure Laterality Date     CATARACT IOL, RT/LT  8/17/2010    left     CATARACT IOL, RT/LT  8/31/2010    right     COLONOSCOPY  2013     COLONOSCOPY WITH CO2 INSUFFLATION N/A 6/8/2018    Procedure:  COLONOSCOPY WITH CO2 INSUFFLATION;  V76.51 (ICD-9-CM) - Z12.11 (ICD-10-CM) - Screen for colon cancer  BMI 22.84  BCBS/, patient refused taxid# and Dr GILLESPIE#  Dr Vitale is referring doctor  [facility]- Fax# [phone];  Surgeon: Duane, William Charles, MD;  Location: [facility] OR     SURGICAL HISTORY OF -   1991    cyst removal from back         Social History     Socioeconomic History     Marital status:      Spouse name: Not on file     Number of children: 6     Years of education: Not on file     Highest education level: Not on file   Occupational History     Occupation: /jered     Employer: [employer]   Social Needs     Financial resource strain: Not on file     Food insecurity     Worry: Not on file     Inability: Not on file     Transportation needs     Medical: Not on file     Non-medical: Not on file   Tobacco Use     Smoking status: Current Every Day Smoker     Packs/day: 1.00     Years: 40.00     Pack years: 40.00     Types: Cigarettes     Start date: 3/20/1972     Smokeless tobacco: Never Used   Substance and Sexual Activity     Alcohol use: Not Currently     Alcohol/week: 14.0 standard drinks     Types: 14 Shots of liquor per week     Comment: history of abuse      Drug use: No     Sexual activity: Yes     Partners: Female     Birth control/protection: None   Lifestyle     Physical activity     Days per week: Not on file     Minutes per session: Not on file     Stress: Not on file   Relationships     Social connections     Talks on phone: Not on file     Gets together: Not on file     Attends [employer] service: Not on file     Active member of club or organization: Not on file     Attends meetings of clubs or organizations: Not on file     Relationship status: Not on file     Intimate partner violence     Fear of current or ex partner: Not on file     Emotionally abused: Not on file     Physically abused: Not on file     Forced sexual activity: Not on file   Other  Topics Concern     Parent/sibling w/ CABG, MI or angioplasty before 65F 55M? Yes     Comment: father, older sis; older brother  at 60 of heart attack   Social History Narrative     Not on file        Family History   Problem Relation Age of Onset     Cancer Mother      Cerebrovascular Disease Mother      Thyroid Disease Mother      Other Cancer Mother      Heart Disease Father 50        MI      Hypertension Father      Hyperlipidemia Father      Coronary Artery Disease Father      Diabetes Sister      Heart Disease Sister         Had a heart valv replaced      Coronary Artery Disease Sister      Hypertension Sister      Anxiety Disorder Sister      Obesity Sister      Chemical Addiction Brother      Diabetes Brother          age 63, MI     Substance Abuse Brother      Unknown/Adopted Maternal Grandmother      Unknown/Adopted Maternal Grandfather      Unknown/Adopted Paternal Grandmother      Unknown/Adopted Paternal Grandfather      Anxiety Disorder Brother      Anxiety Disorder Brother         Immunization History   Administered Date(s) Administered     COVID-19,PF,Pfizer 03/10/2021, 2021     HEPA 2015     HepA-Adult 2017     Influenza (IIV3) PF 10/08/2009     Influenza Vaccine IM > 6 months Valent IIV4 2015     Pneumococcal 23 valent 10/08/2009, 2011     TDAP Vaccine (Adacel) 10/08/2009, 2019, 2020     Zoster vaccine, live 2014       Current Outpatient Medications   Medication Sig     vitamin C (ASCORBIC ACID) 250 MG tablet Take 250 mg by mouth daily     aspirin 81 MG tablet Take 1 tablet (81 mg) by mouth daily     atorvastatin (LIPITOR) 40 MG tablet Take 1 tablet (40 mg) by mouth daily (Patient not taking: Reported on 3/24/2021)     cyanocobalamin (VITAMIN B-12) 1000 MCG tablet Take 1 tablet (1,000 mcg) by mouth daily     Multiple Vitamin (MULTI-DAY PO)      No current facility-administered medications for this visit.         Review of Systems:  I have done  10 points of review systems and all negative except for those mentioned in HPI    Physical examination  Constitutional: Oriented, not in distress  Respiratory: Normal tidal breathing, no shortness of breath, no audible wheezing or stridor over the phone or video visit  Neurological: Alert, orientedx3, no motor deficits  Psychiatric:  Mood and affect are appropriate with insight into his/her medical condition    Data:  Lab Results   Component Value Date    WBC 7.7 04/06/2021     Lab Results   Component Value Date    RBC 4.76 04/06/2021     Lab Results   Component Value Date    HGB 13.5 04/06/2021     Lab Results   Component Value Date    HCT 40.2 04/06/2021     Lab Results   Component Value Date    MCV 85 04/06/2021     Lab Results   Component Value Date    MCH 28.4 04/06/2021     Lab Results   Component Value Date    MCHC 33.6 04/06/2021     Lab Results   Component Value Date    RDW 13.3 04/06/2021     Lab Results   Component Value Date     04/06/2021       Lab Results   Component Value Date     04/06/2021      Lab Results   Component Value Date    POTASSIUM 3.8 04/06/2021     Lab Results   Component Value Date    CHLORIDE 107 04/06/2021     Lab Results   Component Value Date    OLGA 9.3 04/06/2021     Lab Results   Component Value Date    CO2 27 04/06/2021     Lab Results   Component Value Date    BUN 18 04/06/2021     Lab Results   Component Value Date    CR 0.88 04/06/2021     Lab Results   Component Value Date     04/06/2021         TUAN Esteves MD

## 2021-04-07 NOTE — LETTER
4/7/2021       RE: Kailash Murray  [address]  [place] [address]     Dear Colleague,    Thank you for referring your patient, Kailash Murray, to the [facility] CANC[facility] CLINIC at [facility]. Please see a copy of my visit note below.    Kailash is a 66 year old who is being evaluated via a billable video visit.      How would you like to obtain your AVS? MyChart  If the video visit is dropped, the invitation should be resent by: Text to cell phone: [phone]  Will anyone else be joining your video visit? No       DANIELE Coley      Video-Visit Details    Type of service:  Video Visit    Video Time: 12 minutes  Total visit time: 25 minutes    Originating Location (pt. Location): Home    Distant Location (provider location):  [facility] CANCER [facility]     Platform used for Video Visit: [employer]       [facility]  Interventional Pulmonary Clinic Virtual Visit Note    April 7, 2021    Chief complaint:  Kailash Murray is a 66 year old male seen for   Chief Complaint   Patient presents with     Video Visit     NEW PULMONARY NODULES        Reason for clinic visit / Chief complaint:   Pulmonary nodule    Assessment and Plan:  Indeterminate pulmonary nodule, measuring 8 x 5 mm, spiculated, noncalcified, located in the left upper lobe, new nodule when compared to previous CT scan from December 2019.  Both CT scans (current and /2019 CTs) for for lung cancer screening purposes.  We discussed possible etiologies including primary lung cancer and possible approaches at this point.  The nodule is somewhat in deep location therefore I will discuss with our multidisciplinary group to see if this can be biopsied or considered for resection that may require segmentectomy or lobectomy.  We will call patient back this Friday for further information and consensus from multidisciplinary meeting.  Patient is in agreement with the  plan.  COPD, mild based on pulmonary function tests from 2018 revealing FVC of 96% of predicted, FEV1 of 78% of predicted and FEV1 to FVC ratio of 60%.  Patient has no symptoms therefore no inhaler required at this point.  Current smoker, cut down to 1 pack/day has been smoking for 49 years averaging 2 to 3 packs/day.  Former drinker, quit drinking in November 2020.    History of Present Illness:  This is a 66 years old gentleman with no known pulmonary problems except for mild COPD on PFTs as indicated above but not on any treatment because he has no symptom of shortness of breath.  However he has morning phlegm production which is clear not on a daily basis.  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  He has smoked heavily in the past as indicated above.  He did have a lung cancer screening scan in December 2019 in March 2021 revealing new lesion in the left upper lobe which is concerning for primary lung cancer therefore he was referred to our clinic for further evaluation.       Allergies   Allergen Reactions     Coreg [Carvedilol]      With alcohol caused him to be unresponsive     Dye [Contrast Dye]      Rash and hypotension     Hctz      syncope and dehydration with alcohol use     Paxil [Paroxetine]      SLEEPY     Penicillins Rash     Triple Antibiotic Rash        Past Medical History:   Diagnosis Date     Adenomatous polyp 5-2013    needs colonsocpy every 5 years     Alcohol dependence (H)      Asthma      Cervical radiculopathy      CHF (congestive heart failure) (H) 1-2011     COPD (chronic obstructive pulmonary disease) (H)      Coronary artery calcification      DDD (degenerative disc disease), cervical      DDD (degenerative disc disease), lumbar      DDD (degenerative disc disease), lumbosacral      Dupuytren's contracture 3/2/2015     Dyslipidemia      Elevated fasting glucose      ETOHism (H)      HTN (hypertension)      Lumbar spinal stenosis      Moderate major  depression (H) 11/9/2011     Renal insufficiency      Trigger finger, acquired 3/2/2015        Past Surgical History:   Procedure Laterality Date     CATARACT IOL, RT/LT  8/17/2010    left     CATARACT IOL, RT/LT  8/31/2010    right     COLONOSCOPY  2013     COLONOSCOPY WITH CO2 INSUFFLATION N/A 6/8/2018    Procedure: COLONOSCOPY WITH CO2 INSUFFLATION;  V76.51 (ICD-9-CM) - Z12.11 (ICD-10-CM) - Screen for colon cancer  BMI 22.84  BCBS/, patient refused taxid# and Dr GILLESPIE#  Dr Vitale is referring doctor  [facility] on [address]- Fax# [phone];  Surgeon: Duane, William Charles, MD;  Location:  OR     SURGICAL HISTORY OF -   1991    cyst removal from back         Social History     Socioeconomic History     Marital status:      Spouse name: Not on file     Number of children: 6     Years of education: Not on file     Highest education level: Not on file   Occupational History     Occupation: /jered     Employer: [employer]   Social Needs     Financial resource strain: Not on file     Food insecurity     Worry: Not on file     Inability: Not on file     Transportation needs     Medical: Not on file     Non-medical: Not on file   Tobacco Use     Smoking status: Current Every Day Smoker     Packs/day: 1.00     Years: 40.00     Pack years: 40.00     Types: Cigarettes     Start date: 3/20/1972     Smokeless tobacco: Never Used   Substance and Sexual Activity     Alcohol use: Not Currently     Alcohol/week: 14.0 standard drinks     Types: 14 Shots of liquor per week     Comment: history of abuse      Drug use: No     Sexual activity: Yes     Partners: Female     Birth control/protection: None   Lifestyle     Physical activity     Days per week: Not on file     Minutes per session: Not on file     Stress: Not on file   Relationships     Social connections     Talks on phone: Not on file     Gets together: Not on file     Attends Religion service: Not on file     Active member of club or  organization: Not on file     Attends meetings of clubs or organizations: Not on file     Relationship status: Not on file     Intimate partner violence     Fear of current or ex partner: Not on file     Emotionally abused: Not on file     Physically abused: Not on file     Forced sexual activity: Not on file   Other Topics Concern     Parent/sibling w/ CABG, MI or angioplasty before 65F 55M? Yes     Comment: father, older sis; older brother  at 60 of heart attack   Social History Narrative     Not on file        Family History   Problem Relation Age of Onset     Cancer Mother      Cerebrovascular Disease Mother      Thyroid Disease Mother      Other Cancer Mother      Heart Disease Father 50        MI      Hypertension Father      Hyperlipidemia Father      Coronary Artery Disease Father      Diabetes Sister      Heart Disease Sister         Had a heart valv replaced      Coronary Artery Disease Sister      Hypertension Sister      Anxiety Disorder Sister      Obesity Sister      Chemical Addiction Brother      Diabetes Brother          age 63, MI     Substance Abuse Brother      Unknown/Adopted Maternal Grandmother      Unknown/Adopted Maternal Grandfather      Unknown/Adopted Paternal Grandmother      Unknown/Adopted Paternal Grandfather      Anxiety Disorder Brother      Anxiety Disorder Brother         Immunization History   Administered Date(s) Administered     COVID-19,PF,Pfizer 03/10/2021, 2021     HEPA 2015     HepA-Adult 2017     Influenza (IIV3) PF 10/08/2009     Influenza Vaccine IM > 6 months Valent IIV4 2015     Pneumococcal 23 valent 10/08/2009, 2011     TDAP Vaccine (Adacel) 10/08/2009, 2019, 2020     Zoster vaccine, live 2014       Current Outpatient Medications   Medication Sig     vitamin C (ASCORBIC ACID) 250 MG tablet Take 250 mg by mouth daily     aspirin 81 MG tablet Take 1 tablet (81 mg) by mouth daily     atorvastatin (LIPITOR) 40 MG  tablet Take 1 tablet (40 mg) by mouth daily (Patient not taking: Reported on 3/24/2021)     cyanocobalamin (VITAMIN B-12) 1000 MCG tablet Take 1 tablet (1,000 mcg) by mouth daily     Multiple Vitamin (MULTI-DAY PO)      No current facility-administered medications for this visit.         Review of Systems:  I have done 10 points of review systems and all negative except for those mentioned in HPI    Physical examination  Constitutional: Oriented, not in distress  Respiratory: Normal tidal breathing, no shortness of breath, no audible wheezing or stridor over the phone or video visit  Neurological: Alert, orientedx3, no motor deficits  Psychiatric:  Mood and affect are appropriate with insight into his/her medical condition    Data:  Lab Results   Component Value Date    WBC 7.7 04/06/2021     Lab Results   Component Value Date    RBC 4.76 04/06/2021     Lab Results   Component Value Date    HGB 13.5 04/06/2021     Lab Results   Component Value Date    HCT 40.2 04/06/2021     Lab Results   Component Value Date    MCV 85 04/06/2021     Lab Results   Component Value Date    MCH 28.4 04/06/2021     Lab Results   Component Value Date    MCHC 33.6 04/06/2021     Lab Results   Component Value Date    RDW 13.3 04/06/2021     Lab Results   Component Value Date     04/06/2021       Lab Results   Component Value Date     04/06/2021      Lab Results   Component Value Date    POTASSIUM 3.8 04/06/2021     Lab Results   Component Value Date    CHLORIDE 107 04/06/2021     Lab Results   Component Value Date    OLGA 9.3 04/06/2021     Lab Results   Component Value Date    CO2 27 04/06/2021     Lab Results   Component Value Date    BUN 18 04/06/2021     Lab Results   Component Value Date    CR 0.88 04/06/2021     Lab Results   Component Value Date     04/06/2021         TUAN Esteves MD

## 2021-04-07 NOTE — TELEPHONE ENCOUNTER
Lung Nodule Conference      Patient Name: Kailash Murray    Reason for conference discussion (brief overview): 65 yo Heavy smoker, new (from December 2019) 8x5 mm RADHA nodule, spiculated, suspicious for primary lung cancer.    Specific Question:  Biopsy options (CT guided if feasible vs resection)    Pertinent Histology:  None    Referring Physician: Dr KATJA Esteves    The patient's case was presented at the multidisciplinary conference for the above noted reason.  There was a consensus recommendation for the following actions:     This is not amenable to IR biopsy.   Thoracic surgery can see him with PFT's to discuss further, it would require a huge wedge resection or segment 3 resection for diagnosis.    Case Lead:  SAY Snow, CNS    Interventional Radiology Staff Present: Ray Colby PA-C and Dr. Alfredo Marti will call patient and place referral/ order PFT's

## 2021-04-08 LAB
PSA FREE MFR SERPL: 17 %
PSA FREE SERPL-MCNC: 0.1 NG/ML
PSA SERPL-MCNC: 0.6 NG/ML (ref 0–4)

## 2021-04-09 ENCOUNTER — TEAM CONFERENCE (OUTPATIENT)
Dept: SURGERY | Facility: CLINIC | Age: 66
End: 2021-04-09

## 2021-04-09 DIAGNOSIS — R91.1 LUNG NODULE: Primary | ICD-10-CM

## 2021-04-09 NOTE — TELEPHONE ENCOUNTER
"I called Mr. Murray to discuss recommendations from our Lung Nodule conference today.   I explained that Dr. Esteves had presented his case to ask about biopsy options.    Because interventional radiology felt an attempt by them would be high risk with low yield, the recommendations is for him to come to clinic in person to get PFT's and meet with a thoracic surgeon.  He is in favor of this plan.      He told me he is down to 1/2 ppd of cigarettes, having previously smoked close to 2ppd.   He also said he was a \"raging alcoholic\" drinking close to 2 liters of vodka/day but quit completely in Nov 2020 \"after my wife threatened to leave me\".    He is eager to get a diagnosis and will expect a call from scheduling soon.  "

## 2021-04-12 NOTE — PROGRESS NOTES
THORACIC SURGERY - NEW PATIENT OFFICE VISIT      Dear Dr. Cornejo,    I saw Kailash Murray at Dr. Esteves s request in consultation for the evaluation and treatment of a left lung nodule.     HPI  Kailash Murray is a 66 year old man with an left lung upper lobe nodule discovered on lung cancer screening. He has no pulmonary complaints. He is able to walk 2-3 blocks without issues and easily climb 2-3 flights of stairs. He stopped drinking in June 2020 and has gained 22 pounds since then.                         Previsit Tests   CT Chest (3/29/21):    0.8 x 0.5 cm left upper lobe lung nodule, new since 6/2019    PFT (4/13/2021):  FEV1- 2.19 (67%)  DLCO- 19.81 (76%)    PMH  Reviewed as below:    Past Medical History:   Diagnosis Date     Adenomatous polyp 5-2013    needs colonsocpy every 5 years     Alcohol dependence (H)      Asthma      Cervical radiculopathy      CHF (congestive heart failure) (H) 1-2011     COPD (chronic obstructive pulmonary disease) (H)      Coronary artery calcification      DDD (degenerative disc disease), cervical      DDD (degenerative disc disease), lumbar      DDD (degenerative disc disease), lumbosacral      Dupuytren's contracture 3/2/2015     Dyslipidemia      Elevated fasting glucose      ETOHism (H)      HTN (hypertension)      Lumbar spinal stenosis      Moderate major depression (H) 11/9/2011     Renal insufficiency      Trigger finger, acquired 3/2/2015        PSH  Reviewed as below:    Past Surgical History:   Procedure Laterality Date     CATARACT IOL, RT/LT  8/17/2010    left     CATARACT IOL, RT/LT  8/31/2010    right     COLONOSCOPY  2013     COLONOSCOPY WITH CO2 INSUFFLATION N/A 6/8/2018    Procedure: COLONOSCOPY WITH CO2 INSUFFLATION;  V76.51 (ICD-9-CM) - Z12.11 (ICD-10-CM) - Screen for colon cancer  BMI 22.84  BCBS/, patient refused taxid# and Dr GILLESPIE#  Dr Vitale is referring doctor  [facility]- Fax# [phone];  Surgeon: Duane, William Charles,  MD;  Location: [facility] OR     SURGICAL HISTORY OF -   1991    cyst removal from back         ETOH- quit drinking in Nov 2020, used to drink for 25 years, up to 2 liters of vodka daily  TOB- Current smoker; 49 years at 2 1/2 packs per day. Now at 0.5-1 packs per day    Physical examination  /74   Pulse 66   Temp 97.2  F (36.2  C) (Tympanic)   Resp 18   Wt 77.3 kg (170 lb 6.4 oz)   SpO2 99%   BMI 25.16 kg/m     Gen: AAO x 3  HEENT: supple neck, normocephalic  Chest: bilateral clear breath sounds, normal bilateral vesicular breathing  CVS: regular rate and rhythm, S1S2 normal, no murmur  Abd: soft, non tender    From a personal perspective, he lives with his wife. They have 6 children between the two of them. 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     IMPRESSION (R91.1) Lung nodule     This is a 66 year-old man with a left upper lobe lung nodule, suspicious for a primary lung cancer in this patient with a significant smoking history. He is a good surgical candidate though he is currently smoking. The differential diagnosis of the nodule includes inflammatory and infectious causes.    PLAN  30 minutes spent on the date of the encounter doing chart review, history and exam, documentation and further activities per the note. I reviewed the plan as follows:  Procedure planned: Left thoracoscopic, possible Robot-assisted, left lung upper lobe wedge resection, possible segmentectomy, possible lobectomy, mediastinal lymph node dissection.  I discussed the risks and benefits of the operation, including obtaining a diagnosis, resecting and staging the cancer. The risks include bleeding, infection, arrhythmia requiring medication or anticoagulation, prolonged air leak, UTI, chylothorax, DVT and PE, and death.  There is also a risk of prolonged pain, which could require further treatment.  Prolonged air leak could be treated with  bronchoscopy and endobronchial valve insertion.  Postoperative bleeding (rare) could require a return to the OR.   Consent: Pending  Necessary Preop Tests & Appointments: PET scan, PAC  Regional Anesthesia Plan: Intercostal nerve block  Anticoagulation Plan: Lovenox pre-op  Smoking Cessation: advised to quit smoking, will plan surgery in 6 weeks to get PET scan and give him time to quit smoking at least 3 weeks prior to surgery    Mr. Murray was counseled on the importance of smoking cessation and its impact on the jose-operative course. The patient is strongly encouraged to quit smoking for 3 weeks prior to their procedure. If they feel they're absolutely unable to quit, we will proceed as planned given the malignant nature of their disease.  This guideline is in accordance with the World Health Organization (WHO) and has shown to lower the risk of both surgical and anesthesia complications as well as improve post-operative outcomes.     All questions were answered and Kailash Murray is in agreement with the plan.  I appreciate the opportunity to participate in the care of your patient and will keep you updated.  Sincerely,    Omar Mcfarland MD

## 2021-04-13 ENCOUNTER — OFFICE VISIT (OUTPATIENT)
Dept: SURGERY | Facility: CLINIC | Age: 66
End: 2021-04-13
Attending: CLINICAL NURSE SPECIALIST
Payer: COMMERCIAL

## 2021-04-13 VITALS
BODY MASS INDEX: 25.16 KG/M2 | SYSTOLIC BLOOD PRESSURE: 139 MMHG | TEMPERATURE: 97.2 F | WEIGHT: 170.4 LBS | OXYGEN SATURATION: 99 % | RESPIRATION RATE: 18 BRPM | DIASTOLIC BLOOD PRESSURE: 74 MMHG | HEART RATE: 66 BPM

## 2021-04-13 DIAGNOSIS — R91.1 LUNG NODULE: ICD-10-CM

## 2021-04-13 PROCEDURE — 94729 DIFFUSING CAPACITY: CPT | Performed by: INTERNAL MEDICINE

## 2021-04-13 PROCEDURE — 94060 EVALUATION OF WHEEZING: CPT | Performed by: INTERNAL MEDICINE

## 2021-04-13 PROCEDURE — G0463 HOSPITAL OUTPT CLINIC VISIT: HCPCS

## 2021-04-13 PROCEDURE — 99203 OFFICE O/P NEW LOW 30 MIN: CPT | Performed by: THORACIC SURGERY (CARDIOTHORACIC VASCULAR SURGERY)

## 2021-04-13 PROCEDURE — 94726 PLETHYSMOGRAPHY LUNG VOLUMES: CPT | Performed by: INTERNAL MEDICINE

## 2021-04-13 ASSESSMENT — PAIN SCALES - GENERAL: PAINLEVEL: NO PAIN (0)

## 2021-04-13 NOTE — LETTER
4/13/2021         RE: Kailash Murray  [address]  [place] [address]        Dear Colleague,    Thank you for referring your patient, Kailash Murray, to the [facility]. Please see a copy of my visit note below.    THORACIC SURGERY - NEW PATIENT OFFICE VISIT      Dear Dr. Cornejo,    I saw Kailash Murray at Dr. Esteves s request in consultation for the evaluation and treatment of a left lung nodule.     HPI  Kailash Murray is a 66 year old man with an left lung upper lobe nodule discovered on lung cancer screening. He has no pulmonary complaints. He is able to walk 2-3 blocks without issues and easily climb 2-3 flights of stairs. He stopped drinking in June 2020 and has gained 22 pounds since then.                         Previsit Tests   CT Chest (3/29/21):    0.8 x 0.5 cm left upper lobe lung nodule, new since 6/2019    PFT (4/13/2021):  FEV1- 2.19 (67%)  DLCO- 19.81 (76%)    PMH  Reviewed as below:    Past Medical History:   Diagnosis Date     Adenomatous polyp 5-2013    needs colonsocpy every 5 years     Alcohol dependence (H)      Asthma      Cervical radiculopathy      CHF (congestive heart failure) (H) 1-2011     COPD (chronic obstructive pulmonary disease) (H)      Coronary artery calcification      DDD (degenerative disc disease), cervical      DDD (degenerative disc disease), lumbar      DDD (degenerative disc disease), lumbosacral      Dupuytren's contracture 3/2/2015     Dyslipidemia      Elevated fasting glucose      ETOHism (H)      HTN (hypertension)      Lumbar spinal stenosis      Moderate major depression (H) 11/9/2011     Renal insufficiency      Trigger finger, acquired 3/2/2015        PSH  Reviewed as below:    Past Surgical History:   Procedure Laterality Date     CATARACT IOL, RT/LT  8/17/2010    left     CATARACT IOL, RT/LT  8/31/2010    right     COLONOSCOPY  2013     COLONOSCOPY WITH CO2 INSUFFLATION N/A 6/8/2018    Procedure: COLONOSCOPY  WITH CO2 INSUFFLATION;  V76.51 (ICD-9-CM) - Z12.11 (ICD-10-CM) - Screen for colon cancer  BMI 22.84  BCBS/, patient refused taxid# and Dr GILLESPIE#  Dr Vitale is referring doctor  [facility] on [facility]- Fax# [phone];  Surgeon: Duane, William Charles, MD;  Location: [facility] OR     SURGICAL HISTORY OF -   1991    cyst removal from back         ETOH- quit drinking in Nov 2020, used to drink for 25 years, up to 2 liters of vodka daily  TOB- Current smoker; 49 years at 2 1/2 packs per day. Now at 0.5-1 packs per day    Physical examination  /74   Pulse 66   Temp 97.2  F (36.2  C) (Tympanic)   Resp 18   Wt 77.3 kg (170 lb 6.4 oz)   SpO2 99%   BMI 25.16 kg/m     Gen: AAO x 3  HEENT: supple neck, normocephalic  Chest: bilateral clear breath sounds, normal bilateral vesicular breathing  CVS: regular rate and rhythm, S1S2 normal, no murmur  Abd: soft, non tender    From a personal perspective, he lives with his wife. They have 6 children between the two of them. 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     IMPRESSION (R91.1) Lung nodule     This is a 66 year-old man with a left upper lobe lung nodule, suspicious for a primary lung cancer in this patient with a significant smoking history. He is a good surgical candidate though he is currently smoking. The differential diagnosis of the nodule includes inflammatory and infectious causes.    PLAN  30 minutes spent on the date of the encounter doing chart review, history and exam, documentation and further activities per the note. I reviewed the plan as follows:  Procedure planned: Left thoracoscopic, possible Robot-assisted, left lung upper lobe wedge resection, possible segmentectomy, possible lobectomy, mediastinal lymph node dissection.  I discussed the risks and benefits of the operation, including obtaining a diagnosis, resecting and staging the cancer.  The risks include bleeding, infection, arrhythmia requiring medication or anticoagulation, prolonged air leak, UTI, chylothorax, DVT and PE, and death.  There is also a risk of prolonged pain, which could require further treatment.  Prolonged air leak could be treated with bronchoscopy and endobronchial valve insertion.  Postoperative bleeding (rare) could require a return to the OR.   Consent: Pending  Necessary Preop Tests & Appointments: PET scan, PAC  Regional Anesthesia Plan: Intercostal nerve block  Anticoagulation Plan: Lovenox pre-op  Smoking Cessation: advised to quit smoking, will plan surgery in 6 weeks to get PET scan and give him time to quit smoking at least 3 weeks prior to surgery    Mr. Murray was counseled on the importance of smoking cessation and its impact on the jose-operative course. The patient is strongly encouraged to quit smoking for 3 weeks prior to their procedure. If they feel they're absolutely unable to quit, we will proceed as planned given the malignant nature of their disease.  This guideline is in accordance with the World Health Organization (WHO) and has shown to lower the risk of both surgical and anesthesia complications as well as improve post-operative outcomes.     All questions were answered and Kailash Murray is in agreement with the plan.  I appreciate the opportunity to participate in the care of your patient and will keep you updated.  Sincerely,    Omar Mcfarland MD           Again, thank you for allowing me to participate in the care of your patient.        Sincerely,        Omar Mcfarland MD

## 2021-04-13 NOTE — NURSING NOTE
"Oncology Rooming Note    April 13, 2021 4:28 PM   Kailash Murray is a 66 year old male who presents for:    Chief Complaint   Patient presents with     Consult     Lung Nodgule      Initial Vitals: /74   Pulse 66   Temp 97.2  F (36.2  C) (Tympanic)   Resp 18   Wt 77.3 kg (170 lb 6.4 oz)   SpO2 99%   BMI 25.16 kg/m   Estimated body mass index is 25.16 kg/m  as calculated from the following:    Height as of 3/24/21: 1.753 m (5' 9\").    Weight as of this encounter: 77.3 kg (170 lb 6.4 oz). Body surface area is 1.94 meters squared.  No Pain (0) Comment: Data Unavailable   No LMP for male patient.  Allergies reviewed: Yes  Medications reviewed: Yes    Medications: Medication refills not needed today.  Pharmacy name entered into EPIC:    [id number] - [place], [place] - [address]  [facility]  FOR DOD - [address]  [facility] - [address]  [id number] - [place], [place] - [address]    Clinical concerns: No new needs.       Sarah Brunson CMA                        "

## 2021-04-14 LAB
DLCOCOR-%PRED-PRE: 79 %
DLCOCOR-PRE: 20.47 ML/MIN/MMHG
DLCOUNC-%PRED-PRE: 76 %
DLCOUNC-PRE: 19.81 ML/MIN/MMHG
DLCOUNC-PRED: 25.78 ML/MIN/MMHG
ERV-%PRED-PRE: 25 %
ERV-PRE: 0.3 L
ERV-PRED: 1.2 L
EXPTIME-PRE: 7.09 SEC
FEF2575-%PRED-POST: 71 %
FEF2575-%PRED-PRE: 50 %
FEF2575-POST: 1.86 L/SEC
FEF2575-PRE: 1.3 L/SEC
FEF2575-PRED: 2.59 L/SEC
FEFMAX-%PRED-PRE: 59 %
FEFMAX-PRE: 5.1 L/SEC
FEFMAX-PRED: 8.51 L/SEC
FEV1-%PRED-PRE: 67 %
FEV1-PRE: 2.19 L
FEV1FEV6-PRE: 69 %
FEV1FEV6-PRED: 78 %
FEV1FVC-PRE: 68 %
FEV1FVC-PRED: 77 %
FEV1SVC-PRE: 62 %
FEV1SVC-PRED: 70 %
FIFMAX-PRE: 3.09 L/SEC
FRCPLETH-%PRED-PRE: 113 %
FRCPLETH-PRE: 4.08 L
FRCPLETH-PRED: 3.61 L
FVC-%PRED-PRE: 76 %
FVC-PRE: 3.25 L
FVC-PRED: 4.26 L
IC-%PRED-PRE: 93 %
IC-PRE: 3.26 L
IC-PRED: 3.48 L
RVPLETH-%PRED-PRE: 149 %
RVPLETH-PRE: 3.77 L
RVPLETH-PRED: 2.52 L
TLCPLETH-%PRED-PRE: 105 %
TLCPLETH-PRE: 7.33 L
TLCPLETH-PRED: 6.92 L
VA-%PRED-PRE: 83 %
VA-PRE: 5.19 L
VC-%PRED-PRE: 76 %
VC-PRE: 3.56 L
VC-PRED: 4.68 L

## 2021-05-12 NOTE — PROGRESS NOTES
Called patient about his contrast allergy as he is needing a PET scan. He stated he has an allergy to red contrast dye and he had gotten a bit red and itchy. We have ordered the premedication methylprednisone and benadryl for him to take prior to the PET scan. Patient was told he needs a  for the scan in case the benadryl makes him sleepy and he was given direcctions on how to take both medications. He had no further questions or concerns.    32 mg methylpredinsone 12 hours prior to CT   32 mg methylprednisone 2 hours prior to CT   25 mg Benadryl 2 hours prior to CT

## 2021-05-13 NOTE — TELEPHONE ENCOUNTER
I spoke to Kailash about needing pre-medication prior to a PET scan being arranged.   He said it has already been ordered and is waiting for him to pick it up.    Upon review, I saw that SAY Mcwilliams, CNS, had ordered methylprednisone 32 mg to be taken 12 hours and 2 hours prior to this imaging study.

## 2021-05-13 NOTE — TELEPHONE ENCOUNTER
"I spoke again to Kailash to get an update on his smoking cessation.   \"I haven't quit completely but am down to about 7 cigarettes a day, and have set June 1st as my quit day\".   He declined my offer for Rx aides, said he quit drinking the same way and has a \"great support system sitting right next to me\".   He said he feels confident and proud of himself and appreciated my call.  "

## 2021-05-17 NOTE — TELEPHONE ENCOUNTER
FUTURE VISIT INFORMATION      SURGERY INFORMATION:    Date: 21    Location: [facility] OR    Surgeon:  Omar Mcfarland MD    Anesthesia Type:  general    Procedure: Left thoracoscopic possible Robot-assisted, left lung upper lobe wedge resection, possible segmentectomy, possible lobectomy, mediastinal lymph node dissection.    Consult: ov     RECORDS REQUESTED FROM:       Primary Care Provider: Carolyne Cornejo MD- [facility]    Pertinent Medical History: COPD, lung nodule, Chronic diastolic heart failure, hypertension, coronary artery calcification     Most recent EKG+ Tracin20- [facility]    Most recent ECHO: 11    Most recent Coronary Angiogram: 11    Most recent PFT's: 21

## 2021-05-17 NOTE — TELEPHONE ENCOUNTER
Spoke with patient to schedule procedure with Dr. Omar Mcfarland    Procedure was scheduled on 06/23 at [facility] OR  Patient will have H&P with PAC 06/14    Patient is aware a COVID-19 test is needed before their procedure. The test should be with-in 4 days of their procedure.   Test Details: TBD    Patient is aware a / is needed day of surgery.   Surgery letter was sent via vozero, patient has my direct contact information for any further questions.

## 2021-06-14 NOTE — PATIENT INSTRUCTIONS
Preparing for Your Surgery      Name:  Kailash Murray   MRN:  [id number]   :  1955   Today's Date:  2021       Arriving for surgery:  Surgery date:  2021  Arrival time:  10:15 am    Restrictions due to COVID 19:       One visitor is allowed in the Pre Op area. When you go into surgery, one visitor is allowed to wait in the Surgery Waiting Room       (provided there is enough space to social distance).   After surgery- Two visitors are allowed at a time if you have a private room and one visitor is allowed for those in a semi-private room.   Every 4 days the visitor(s) can rotate. During the 4 day period, the visitor(s) must be consistent. No visitors under the age of 18 years old.   Visiting Hours: 8 am - 8:30 pm   No ill visitors.   All visitors must wear face mask.    [employer] parking is available for anyone with mobility limitations or disabilities.  ([place]  24 hours/ 7 days a week; [facility]  7 am- 3:30 pm, Mon- Fri)    Please come to:     [facility] [address]  [address] [place] [address]       -    Please proceed to [address] on the 3rd floor. [phone]?     - ?If you are in need of directions, wheelchair or escort please stop at the Information Desk in the lobby.       What can I eat or drink?  -  You may eat and drink normally for up to 8 hours before your surgery. (Until 4 am)  -  You may have clear liquids until 2 hours before surgery. (Until 10:15 am arrival time)    Examples of clear liquids:  Water  Clear broth  Juices (apple, white grape, white cranberry  and cider) without pulp  Noncarbonated, powder based beverages  (lemonade and Cory-Aid)  Sodas (Sprite, 7-Up, ginger ale and seltzer)  Coffee or tea (without milk or cream)  Gatorade    -  No Alcohol for at least 24 hours before surgery     Which medicines can I take?    Hold Aspirin for 7 days before surgery.     Hold Multivitamins for 7 days before  surgery.    Hold Supplements (glucosamine) for 7 days before surgery.  Hold Ibuprofen (Advil, Motrin) for 1 day before surgery--unless otherwise directed by surgeon.  Hold Naproxen (Aleve) for 4 days before surgery.    -  DO NOT take these medications the day of surgery:  Vitamin C  Magnesium        -  PLEASE TAKE these medications the day of surgery:  Atorvastatin (if usually taken in the am)      How do I prepare myself?  - Please take 2 showers before surgery using Scrubcare or Hibiclens soap.    Use this soap only from the neck to your toes.     Leave the soap on your skin for one minute--then rinse thoroughly.      You may use your own shampoo and conditioner; no other hair products.   - Please remove all jewelry and body piercings.  - No lotions, deodorants or fragrance.  - No makeup or fingernail polish.   - Bring your ID and insurance card.    - All patients are required to have a Covid-19 test within 4 days of surgery/procedure.      -Patients will be contacted by the [facility] scheduling team within 1 week of surgery to make an appointment.      - Patients may call the Scheduling team at [phone] if they have not been scheduled within 4 days of  surgery.        Questions or Concerns:    - For any questions regarding the day of surgery or your hospital stay, please contact the Pre Admission Nursing Office at [phone].       - If you have health changes between today and your surgery please call your surgeon.       For questions after surgery please call your surgeons office.           Enhanced Recovery After Surgery     This is a team effort, including you, to get you back on your feet, eating and drinking normally and out of the hospital as quickly as possible.  The goals are: 1) NO INFECTIONS and   2) RETURN TO NORMAL DIET    How can we achieve these goals?  1) STAY ACTIVE: Walk every day before your surgery; try to increase the amount every day.  Walk after surgery as much as you can-the  nurses will help you.  Walking speeds healing and gets you home quicker, you heal better at home and have less risk of infection.     2) INCENTIVE SPIROMETER: Practice your incentive spirometer 4 times per day with 5 repetitions each time.  Using the incentive spirometer can strengthen your muscles between your ribs and help you have a strong cough after surgery.  A more effective cough can help prevent problems with your lungs.    3) STAY HYDRATED: Drink clear liquids up until 2 hours before your surgery.     We would like you to purchase a drink such as Gatorade or Ensure Clear (not the milkshake type).  Drink this before bedtime and on the way into the hospital, drink between 8-10 ounces or until you feel hydrated.      Keeping well hydrated leads to your veins being plump, you wake up faster, and you are less likely to be nauseated. Start drinking water as soon as you can after surgery and advance to clear liquids and food as tolerated.  IV fluids contain salt, drinking fluids will minimize the amount of IV fluids you need and decrease the amount of salt you get.    The most common reason for the patient to be readmitted is dehydration. Staying hydrated after you go home from the hospital is very important.  Ensure or Ensure Clear are good options to keep you hydrated.     4) PAIN MANAGEMENT: If we minimize the amount of opioids and narcotics, and use regional blocks (which numb the area where your surgery is) along with oral pain medications; you will have less side effects of nausea and constipation. Narcotics can slow down your bowels and cause you to stay in the hospital longer.     Our goal is to keep you comfortable; eating and drinking normally and back home safely.     Using an Incentive Spirometer    An incentive spirometer is a device that helps you do deep breathing exercises. These exercises expand your lungs, aid in circulation, and help prevent pneumonia. Deep breathing exercises also help you  breathe better and improve the function of your lungs by:    Keeping your lungs clear    Strengthening your breathing muscles    Helping prevent respiratory complications or problems  The incentive spirometer gives you a way to take an active part in your care. A nurse or therapist will teach you breathing exercises. To do these exercises, you will breathe in through your mouth and not your nose. The incentive spirometer only works correctly if you breathe in through your mouth.    Steps to clear lungs  Step 1. Exhale normally. Then, inhale normally.    Relax and breathe out.  Step 2. Place your lips tightly around the mouthpiece.    Make sure the device is upright and not tilted.  Step 3. Inhale as much air as you can through the mouthpiece (don't breath through your nose).    Inhale slowly and deeply.    Hold your breath long enough to keep the balls or disk raised for at least 3 to 5 seconds, or as instructed by your healthcare provider.  Step 4. Repeat the exercise regularly.  Begin using the Incentive Spirometer one week prior to your surgery, 4 times per day-5 breaths each.

## 2021-06-14 NOTE — H&P
Pre-Operative H & P     CC:  Preoperative exam to assess for increased cardiopulmonary risk while undergoing surgery and anesthesia.    Date of Encounter: 6/14/2021  Primary Care Physician:  Carolyne Cornejo  Reason for Visit: Lung nodule       HPI     Kailash Murray is a 67 y/o male who presents for pre-operative H&P in preparation for Left thoracoscopic possible Robot-assisted, left lung upper lobe wedge resection, possible segmentectomy, possible lobectomy, mediastinal lymph node dissection. with Omar Mcfarland MD on 6/16/21 at [facility] for treatment of a Lung nodule. Patient is being evaluated for comorbid conditions of HTN, HLD, COPD, depression, hx ETOH abuse (in remission), CKD, cervical radiculopathy.    Mr. Murray has a left lung upper lobe nodule discovered on lung cancer screening. He has no pulmonary complaints. He is able to walk 2-3 blocks without issues and easily climb 2-3 flights of stairs. He stopped drinking in June 2020 and has gained 22 pounds since then. He now presents for the above procedure.    History was obtained from patient & chart review.      Past Medical History  Past Medical History:   Diagnosis Date     Adenomatous polyp 5-2013    needs colonsocpy every 5 years     Alcohol dependence (H)      Asthma      Cervical radiculopathy      CHF (congestive heart failure) (H) 1-2011     COPD (chronic obstructive pulmonary disease) (H)      Coronary artery calcification      DDD (degenerative disc disease), cervical      DDD (degenerative disc disease), lumbar      DDD (degenerative disc disease), lumbosacral      Dupuytren's contracture 3/2/2015     Dyslipidemia      Elevated fasting glucose      ETOHism (H)      HTN (hypertension)      Lumbar spinal stenosis      Moderate major depression (H) 11/9/2011     Renal insufficiency      Trigger finger, acquired 3/2/2015       Past Surgical History  Past Surgical History:   Procedure Laterality  Date     CATARACT IOL, RT/LT  8/17/2010    left     CATARACT IOL, RT/LT  8/31/2010    right     COLONOSCOPY  2013     COLONOSCOPY WITH CO2 INSUFFLATION N/A 6/8/2018    Procedure: COLONOSCOPY WITH CO2 INSUFFLATION;  V76.51 (ICD-9-CM) - Z12.11 (ICD-10-CM) - Screen for colon cancer  BMI 22.84  BCBS/, patient refused taxid# and Dr GILLESPIE#  Dr Vitale is referring doctor  [facility] on [address] [facility]- Fax# [phone];  Surgeon: Duane, William Charles, MD;  Location: [facility] OR     SURGICAL HISTORY OF -   1991    cyst removal from back        Hx of Blood transfusions/reactions: denies     Hx of abnormal bleeding or anti-platelet use: on ASA 81 mg    Menstrual history: No LMP for male patient.:      Steroid use in the last year: denies    Personal or FH with difficulty with Anesthesia:  denies    Prior to Admission Medications  Current Outpatient Medications   Medication Sig Dispense Refill     aspirin 81 MG tablet Take 1 tablet (81 mg) by mouth daily (Patient taking differently: Take 81 mg by mouth every morning ) 30 tablet 4     cyanocobalamin (VITAMIN B-12) 1000 MCG tablet Take 1,000 mcg by mouth every morning        Glucosamine-Chondroitin (OSTEO BI-FLEX REGULAR STRENGTH PO) Take by mouth every morning       magnesium 250 MG tablet Take 1 tablet by mouth every morning       vitamin C (ASCORBIC ACID) 250 MG tablet Take 250 mg by mouth every morning        atorvastatin (LIPITOR) 40 MG tablet Take 1 tablet (40 mg) by mouth daily (Patient taking differently: Take 40 mg by mouth daily ) 90 tablet 3     atorvastatin (LIPITOR) 40 MG tablet Take 1 tablet (40 mg) by mouth daily (Patient not taking: Reported on 3/24/2021) 90 tablet 3     methylPREDNISolone (MEDROL) 32 MG tablet Take 1 tablet (32 mg) by mouth daily Take one tablet by mouth 12 hours prior to your CT. Take the second tablet by mouth 2 hours prior to your CT. (Patient taking differently: Take 32 mg by mouth daily Take one tablet by mouth 12 hours prior to  your CT. Take the second tablet by mouth 2 hours prior to your CT.) 2 tablet 0     Multiple Vitamin (MULTI-DAY PO)          Allergies  Allergies   Allergen Reactions     Coreg [Carvedilol]      With alcohol caused him to be unresponsive     Dye [Contrast Dye]      Rash and hypotension     Hctz      syncope and dehydration with alcohol use     Paxil [Paroxetine]      SLEEPY     Pcn [Penicillins] Rash     Triple Antibiotic Rash       Social History  Social History     Socioeconomic History     Marital status:      Spouse name: Not on file     Number of children: 6     Years of education: Not on file     Highest education level: Not on file   Occupational History     Occupation: /jered     Employer: [employer]   Social Needs     Financial resource strain: Not on file     Food insecurity     Worry: Not on file     Inability: Not on file     Transportation needs     Medical: Not on file     Non-medical: Not on file   Tobacco Use     Smoking status: Current Every Day Smoker     Packs/day: 1.00     Years: 40.00     Pack years: 40.00     Types: Cigarettes     Start date: 3/20/1972     Smokeless tobacco: Never Used   Substance and Sexual Activity     Alcohol use: Not Currently     Alcohol/week: 14.0 standard drinks     Types: 14 Shots of liquor per week     Comment: history of abuse      Drug use: No     Sexual activity: Yes     Partners: Female     Birth control/protection: None   Lifestyle     Physical activity     Days per week: Not on file     Minutes per session: Not on file     Stress: Not on file   Relationships     Social connections     Talks on phone: Not on file     Gets together: Not on file     Attends Yazidism service: Not on file     Active member of club or organization: Not on file     Attends meetings of clubs or organizations: Not on file     Relationship status: Not on file     Intimate partner violence     Fear of current or ex partner: Not on file     Emotionally abused: Not on file      Physically abused: Not on file     Forced sexual activity: Not on file   Other Topics Concern     Parent/sibling w/ CABG, MI or angioplasty before 65F 55M? Yes     Comment: father, older sis; older brother  at 60 of heart attack   Social History Narrative     Not on file       Family History  Family History   Problem Relation Age of Onset     Cancer Mother      Cerebrovascular Disease Mother      Thyroid Disease Mother      Other Cancer Mother      Heart Disease Father 50        MI      Hypertension Father      Hyperlipidemia Father      Coronary Artery Disease Father      Deep Vein Thrombosis (DVT) Father      Diabetes Sister      Heart Disease Sister         Had a heart valv replaced      Coronary Artery Disease Sister      Hypertension Sister      Anxiety Disorder Sister      Obesity Sister      Chemical Addiction Brother      Diabetes Brother          age 63, MI     Substance Abuse Brother      Unknown/Adopted Maternal Grandmother      Unknown/Adopted Maternal Grandfather      Unknown/Adopted Paternal Grandmother      Unknown/Adopted Paternal Grandfather      Anxiety Disorder Brother      Anxiety Disorder Brother      Anesthesia Reaction No family hx of             ROS/MED HX  The complete review of systems is negative other than noted in the HPI or here.  Patient denies recent illness, fever and respiratory infection during past month.  Pt denies steroid use during past year.    ENT/Pulmonary: Comment: PFT 21:  Moderate Airflow Obstruction without a significant bronchodilator response.   There is air trapping without hyperinflation.   Normal diffusing capacity.     49 pk yr hx, smokes <1/2 ppd      (+) tobacco use, Current use, COPD,  (-) sleep apnea   Neurologic:  - neg neurologic ROS   (+) no peripheral neuropathy  (-) no seizures and no CVA   Cardiovascular: Comment: CT Angio :  1.  Calcium score of 600 places the patient between the 90th and 100th   percentile for age-matched, asymptomatic,  apparently healthy population.    2.  Diffuse mild to moderate coronary artery disease involving the   proximal left anterior descending, proximal circumflex coronary artery,   and diffuse mixed plaque noted in the entire right coronary artery.  As   mentioned above, all the plaques are mild to moderate in nature, estimated   to be anywhere between 30 and 50% or less.    3.  No occlusive coronary artery disease noted.  4.  Mild to moderate concentric left ventricular hypertrophy with a normal   ejection fraction.  5.  Recommend very aggressive risk factor modification, given the   patient's high risk for having coronary artery disease event in the next   10 years as predicted by the high calcium score and the [place] risk   criteria.      (+) Dyslipidemia --CAD ---Taking blood thinners Instructions Given to patient: ASA 81 mg. Previous cardiac testing   Echo: Date: 2011 Results:   Upper normal LV size with overall LV systolic function at the lower   limits of normal (estimated ejection fraction of 50%).   Mild concentric LVH.   Mild septal asynchrony.   Moderate left atrial dilatation.   Mild aortic valve sclerosis without stenosis.   Mild mitral regurgitation.   Right ventricular systolic pressure estimated to be mildly elevated at 36   mmHg + RAP.    Stress Test: Date: Results:    ECG Reviewed: Date: 9/1/20 Results:  Normal sinus rhythm  Right bundle branch block  Abnormal ECG  When compared with ECG of 29-JUL-2020 21:24,  FL interval has decreased  Ventricular rate 76 bpm    Cath: Date: Results:   (-) hypertension   METS/Exercise Tolerance: 4 - Raking leaves, gardening Comment: Activity somewhat limited by hip pain   Hematologic:  - neg hematologic  ROS  (-) history of blood clots and history of blood transfusion   Musculoskeletal: Comment: B/L shoulder pain    Right hip pain      GI/Hepatic: Comment: Hx ETOH abuse, sober since 11/2019     (-) GERD and liver disease   Renal/Genitourinary: Comment: Urinary  "incontinence        Endo:  - neg endo ROS  (-) Type II DM   Psychiatric/Substance Use:       Infectious Disease:  - neg infectious disease ROS     Malignancy:   (+) Malignancy, History of Lung.  Lung CA Active status post.        Other:                  Temp: 97.9  F (36.6  C) Temp src: Oral BP: 119/76 Pulse: 73   Resp: 16 SpO2: 94 %         178 lbs 11.2 oz  5' 9\"[pt reported[   Body mass index is 26.39 kg/m .       Physical Exam  Constitutional: Awake, alert, cooperative, no apparent distress, and appears stated age.  Eyes: Pupils equal, round and reactive to light, extra ocular muscles intact, sclera clear, conjunctiva normal.  HENT: Normocephalic, oral pharynx with moist mucus membranes, good dentition. No goiter appreciated. No removable dental hardware.  Respiratory: Clear to auscultation bilaterally, no crackles or wheezing. No SOB when supine.  Cardiovascular: Regular rate and rhythm, normal S1 and S2, and no murmur noted.  Carotids +2, no bruits. No edema. Palpable pulses to radial, DP and PT arteries.   GI: Normal bowel sounds, soft, non-distended, non-tender, no masses palpated.    Lymph/Hematologic: No cervical lymphadenopathy and no supraclavicular lymphadenopathy.  Genitourinary:  deferred  Skin: Warm and dry.  No rashes.   Musculoskeletal: Mildly limited extension ROM of neck. There is no redness, warmth, or swelling of the joints. Gross motor strength is normal.    Neurologic: Awake, alert, oriented to name, place and time. Cranial nerves II-XII are grossly intact. Gait is normal. Ambulates from chair to exam table, seats self, lies supine and sits back up w/o assistance.  Neuropsychiatric: Calm, cooperative. Normal affect. Pleasant. Answers questions appropriately, follows commands w/o difficulty.        PRIOR LABS/DIAGNOSTIC STUDIES:    All labs and imaging personally reviewed      PFT 4/13/21  FVC-Pred 4.26    L  04/13/2021  2:19    FVC-Pre 3.25    L  04/13/2021  2:19    FVC-%Pred-Pre " 76    %  04/13/2021  2:19    FEV1-Pre 2.19    L  04/13/2021  2:19    FEV1-%Pred-Pre 67    %  04/13/2021  2:19    FEV1FVC-Pred 77    %  04/13/2021  2:19    FEV1FVC-Pre 68    %  04/13/2021  2:19    FEFMax-Pred 8.51    L/sec  04/13/2021  2:19    FEFMax-Pre 5.10    L/sec  04/13/2021  2:19    FEFMax-%Pred-Pre 59    %  04/13/2021  2:19    FEF2575-Pred 2.59    L/sec  04/13/2021  2:19    FEF2575-Pre 1.30    L/sec  04/13/2021  2:19    ZHA5699-%Pred-Pre 50    %  04/13/2021  2:19    FEF2575-Post 1.86    L/sec  04/13/2021  2:19    [id number]-%Pred-Post 71    %  04/13/2021  2:19    ExpTime-Pre 7.09    sec  04/13/2021  2:19    FIFMax-Pre 3.09    L/sec  04/13/2021  2:19    VC-Pred 4.68    L  04/13/2021  2:19    VC-Pre 3.56    L  04/13/2021  2:19    VC-%Pred-Pre 76    %  04/13/2021  2:19    IC-Pred 3.48    L  04/13/2021  2:19    IC-Pre 3.26    L  04/13/2021  2:19    IC-%Pred-Pre 93    %  04/13/2021  2:19    ERV-Pred 1.20    L  04/13/2021  2:19    ERV-Pre 0.30    L  04/13/2021  2:19    ERV-%Pred-Pre 25    %  04/13/2021  2:19    FEV1FEV6-Pred 78    %  04/13/2021  2:19    FEV1FEV6-Pre 69    %  04/13/2021  2:19    FRCPleth-Pred 3.61    L  04/13/2021  2:19    FRCPleth-Pre 4.08    L  04/13/2021  2:19    FRCPleth-%Pred-Pre 113    %  04/13/2021  2:19    RVPleth-Pred 2.52    L  04/13/2021  2:19    RVPleth-Pre 3.77    L  04/13/2021  2:19    RVPleth-%Pred-Pre 149    %  04/13/2021  2:19    TLCPleth-Pred 6.92    L  04/13/2021  2:19    TLCPleth-Pre 7.33    L  04/13/2021  2:19    TLCPleth-%Pred-Pre 105    %  04/13/2021  2:19    DLCOunc-Pred 25.78    ml/min/mmHg  04/13/2021  2:19    DLCOunc-Pre 19.81    ml/min/mmHg  04/13/2021  2:19    DLCOunc-%Pred-Pre 76    %  04/13/2021  2:19    DLCOcor-Pre 20.47    ml/min/mmHg  04/13/2021   2:19    DLCOcor-%Pred-Pre 79    %  04/13/2021  2:19    VA-Pre 5.19    L  04/13/2021  2:19    VA-%Pred-Pre 83    %  04/13/2021  2:19    FEV1SVC-Pred 70    %  04/13/2021  2:19    FEV1SVC-Pre 62    %  04/13/2021  2:19      The FVC is normal.  FEV1 and FEV1/FEV6 ratio are reduced.  The inspiratory flow rates are reduced.  While the TLC is within normal limits, the RV is increased.  Following administration of bronchodilators, there is no significant response.  The diffusing    capacity is normal.   IMPRESSION:   Moderate Airflow Obstruction without a significant bronchodilator response.   There is air trapping without hyperinflation.   Normal diffusing capacity.          CT CHEST LUNG CANCER SCREEN LOW DOSE WITHOUT CONTRAST March 29, 2021  IMPRESSION:   1. ACR Assessment Category:  Lung-RADS Category 4B. Suspicious.  Recommendation:  Chest CT with contrast  (please use exam code IMG  202) or without contrast (please use exam code ), PET/CT and/or  tissue sampling depending on the probability of malignancy and  comorbidities, PET/CT may be used when there is a >/= 8 mm solid  component. This category is given as there is a new irregular nodular  opacity at the left upper lobe with a maximal size of 0.8. Further  oncologic workup is recommended .   2. Significant Incidental Finding(s):  Category S: Yes. Moderate  coronary artery calcifications.  3. Stable mild peribronchial wall thickening bilaterally.          EKG 9/1/20  Normal sinus rhythm  Right bundle branch block  Abnormal ECG  When compared with ECG of 29-JUL-2020 21:24,  NC interval has decreased  Ventricular rate 76 bpm      ECHOCARDIOGRAM 2011  Final Conclusion   Upper normal LV size with overall LV systolic function at the lower   limits of normal (estimated ejection fraction of 50%).   Mild concentric LVH.   Mild septal asynchrony.   Moderate left atrial dilatation.   Mild aortic valve sclerosis without stenosis.    Mild mitral regurgitation.   Right ventricular systolic pressure estimated to be mildly elevated at 36   mmHg + RAP.      CT ANGIO 2011  CT CORONARY ARTERIAL ANGIOGRAM 2/9/2011  CONCLUSION:  1.  Calcium score of 600 places the patient between the 90th and 100th   percentile for age-matched, asymptomatic, apparently healthy population.    2.  Diffuse mild to moderate coronary artery disease involving the   proximal left anterior descending, proximal circumflex coronary artery,   and diffuse mixed plaque noted in the entire right coronary artery.  As   mentioned above, all the plaques are mild to moderate in nature, estimated   to be anywhere between 30 and 50% or less.    3.  No occlusive coronary artery disease noted.  4.  Mild to moderate concentric left ventricular hypertrophy with a normal   ejection fraction.  5.  Recommend very aggressive risk factor modification, given the   patient's high risk for having coronary artery disease event in the next   10 years as predicted by the high calcium score and the [place] risk   criteria.      CBC:   Lab Results   Component Value Date    WBC 7.7 04/06/2021    WBC 7.0 08/27/2019    HGB 13.5 04/06/2021    HGB 13.7 08/27/2019    HCT 40.2 04/06/2021    HCT 41.0 08/27/2019     04/06/2021     08/27/2019     BMP:   Lab Results   Component Value Date     04/06/2021     05/10/2019    POTASSIUM 3.8 04/06/2021    POTASSIUM 4.0 05/10/2019    CHLORIDE 107 04/06/2021    CHLORIDE 107 05/10/2019    CO2 27 04/06/2021    CO2 25 05/10/2019    BUN 18 04/06/2021    BUN 21 05/10/2019    CR 0.88 04/06/2021    CR 0.73 05/10/2019     (H) 04/06/2021    GLC 77 05/10/2019     COAGS: No results found for: PTT, INR, FIBR  POC: No results found for: BGM, HCG, HCGS  HEPATIC:   Lab Results   Component Value Date    ALBUMIN 4.0 04/06/2021    PROTTOTAL 7.5 04/06/2021    ALT 18 04/06/2021    AST 11 04/06/2021    ALKPHOS 99 04/06/2021    BILITOTAL 0.2 04/06/2021      OTHER:   Lab Results   Component Value Date    OLGA 9.3 04/06/2021    TSH 2.90 07/18/2009    CRP <2.9 07/10/2018     Labs/Diagnostic Studies today: (personally reviewed)  BMP, CBC, BNP, T&S, Stress test    Sodium 133 - 144 mmol/L 137      Potassium 3.4 - 5.3 mmol/L 4.1     Chloride 94 - 109 mmol/L 110High      Carbon Dioxide 20 - 32 mmol/L 25     Anion Gap 3 - 14 mmol/L <1Low      Glucose 70 - 99 mg/dL 139High      Urea Nitrogen 7 - 30 mg/dL 20     Creatinine 0.66 - 1.25 mg/dL 0.75     GFR Estimate >60 mL/min/ >90    Comment: Non  GFR Calc   Starting 12/18/2018, serum creatinine based estimated GFR (eGFR) will be   calculated using the Chronic Kidney Disease Epidemiology Collaboration   (CKD-EPI) equation.     GFR Estimate If Black >60 mL/min/ >90    Comment:  GFR Calc   Starting 12/18/2018, serum creatinine based estimated GFR (eGFR) will be   calculated using the Chronic Kidney Disease Epidemiology Collaboration   (CKD-EPI) equation.     Calcium 8.5 - 10.1 mg/dL 9.3        WBC 4.0 - 11.0 10e9/L 15.1High       RBC Count 4.4 - 5.9 10e12/L 4.83     Hemoglobin 13.3 - 17.7 g/dL 13.8     Hematocrit 40.0 - 53.0 % 41.6     MCV 78 - 100 fl 86     MCH 26.5 - 33.0 pg 28.6     MCHC 31.5 - 36.5 g/dL 33.2     RDW 10.0 - 15.0 % 14.3     Platelet Count 150 - 450 10e9/L 225        N-Terminal Pro Bnp 0 - 125 pg/mL 188High           Outside records reviewed from: Care Everywhere (EKG, echocardiogram, CT angio @ [facility])    ASSESSMENT and PLAN  Kailash Murray is a 66 year old male scheduled to undergo Left thoracoscopic possible Robot-assisted, left lung upper lobe wedge resection, possible segmentectomy, possible lobectomy, mediastinal lymph node dissection. with Omar Mcfarland MD on 6/16/21 at [facility] for treatment of a Lung nodule.      Pt has had prior anesthetic. Type: MAC.    No history of anesthetic complications     He has the following specific  operative considerations:   # AARON 3/8 = intermediate risk  # VTE risk: 3%  # Risk of PONV score = 1.  If > 2, anti-emetic intervention recommended.  # Anesthesia considerations:  Refer to PAC assessment in anesthesia records      CARDIAC: METS 4,  Activity somewhat limited by hip pain     # RCRI : High risk surgery, Coronary Artery Disease.  6.6% risk of major adverse cardiac event.     #  Echo 2011:  EF 50%, Moderate left atrial dilatation, Mild aortic valve sclerosis without stenosis, Right ventricular systolic pressure estimated to be mildly elevated at 36 mmHg + RAP.     #  CT Angio 2011: Diffuse mild to moderate CAD involving the proximal LAD and PCx, and diffuse mixed plaque noted in the entire RCA.  Plaques are mild to moderate in nature, estimated to be anywhere between 30 and 50% or less.  High risk for having coronary artery disease event in the next 10 years as predicted by the high calcium score and the [place] risk criteria.    # BNP today:  188    # Dobutamine stress test today --->> Normal dobutamine stress echocardiogram without evidence of inducible ischemia.    PULMONARY:     # Current smoker, 49 pk yr hx, smokes < 1/2 ppd    # PFT 4/13/21:  Moderate Airflow Obstruction without a significant bronchodilator response.   There is air trapping without hyperinflation.   Normal diffusing capacity.     # Denies inhaler use    GI:     # Denies GERD    /RENAL:     # CKD per chart review. Creatinine 0.75, GFR >90 today    ENDO: BMI 26    # No DM    ORTHO:     # Mildly limited extension ROM of neck    NEURO/PSYCH:     # Hx ETOH abuse, sober since 11/2019    Patient was discussed with Dr Castellanos.    Patient is optimized and is acceptable candidate for the proposed procedure. No further diagnostic evaluation is needed.    Final plan per anesthesiologist on day of surgery.     Arrival time, NPO, shower and medication instructions provided by nursing staff today.  Preparing For Your Surgery handout given.    58  minutes spent on the date of the encounter doing chart review, history and exam, documentation and further activities as noted below:    Prep time: 14 minutes  Visit time: 29 minutes  Documentation time: 15 minutes      Ghada Duran PA-C  Preoperative Assessment Center  [facility]  Phone: [phone]  Fax: [phone]

## 2021-06-14 NOTE — ANESTHESIA PREPROCEDURE EVALUATION
Anesthesia Pre-Procedure Evaluation    Patient: Kailash Murray   MRN: [id number] : 1955        Preoperative Diagnosis: Lung nodule [R91.1]   Procedure : Procedure(s):  Left thoracoscopic possible Robot-assisted, left lung upper lobe wedge resection, possible segmentectomy, possible lobectomy, mediastinal lymph node dissection.     Past Medical History:   Diagnosis Date     Adenomatous polyp     needs colonsocpy every 5 years     Alcohol dependence (H)      Asthma      Cervical radiculopathy      CHF (congestive heart failure) (H) -     COPD (chronic obstructive pulmonary disease) (H)      Coronary artery calcification      DDD (degenerative disc disease), cervical      DDD (degenerative disc disease), lumbar      DDD (degenerative disc disease), lumbosacral      Dupuytren's contracture 3/2/2015     Dyslipidemia      Elevated fasting glucose      ETOHism (H)      HTN (hypertension)      Lumbar spinal stenosis      Moderate major depression (H) 2011     Renal insufficiency      Trigger finger, acquired 3/2/2015      Past Surgical History:   Procedure Laterality Date     CATARACT IOL, RT/LT  2010    left     CATARACT IOL, RT/LT  2010    right     COLONOSCOPY       COLONOSCOPY WITH CO2 INSUFFLATION N/A 2018    Procedure: COLONOSCOPY WITH CO2 INSUFFLATION;  V76.51 (ICD-9-CM) - Z12.11 (ICD-10-CM) - Screen for colon cancer  BMI 22.84  BCBS/, patient refused taxid# and Dr GILLESPIE#  Dr Vitale is referring doctor  [facility]- Fax# [phone];  Surgeon: Duane, William Charles, MD;  Location:  OR     SURGICAL HISTORY OF -       cyst removal from back       Allergies   Allergen Reactions     Coreg [Carvedilol]      With alcohol caused him to be unresponsive     Dye [Contrast Dye]      Rash and hypotension     Hctz      syncope and dehydration with alcohol use     Paxil [Paroxetine]      SLEEPY     Pcn [Penicillins] Rash     Triple Antibiotic Rash       Social History     Tobacco Use     Smoking status: Current Every Day Smoker     Packs/day: 1.00     Years: 40.00     Pack years: 40.00     Types: Cigarettes     Start date: 3/20/1972     Smokeless tobacco: Never Used   Substance Use Topics     Alcohol use: Not Currently     Alcohol/week: 14.0 standard drinks     Types: 14 Shots of liquor per week     Comment: history of abuse       Wt Readings from Last 1 Encounters:   06/14/21 81.1 kg (178 lb 11.2 oz)        Anesthesia Evaluation   Pt has had prior anesthetic. Type: MAC.    No history of anesthetic complications       ROS/MED HX  ENT/Pulmonary: Comment: PFT 4/13/21:  Moderate Airflow Obstruction without a significant bronchodilator response.   There is air trapping without hyperinflation.   Normal diffusing capacity.     49 pk yr hx, smokes <1/2 ppd      (+) tobacco use, Current use, COPD,  (-) sleep apnea   Neurologic:  - neg neurologic ROS   (+) no peripheral neuropathy  (-) no seizures and no CVA   Cardiovascular: Comment: CT Angio 2011:  1.  Calcium score of 600 places the patient between the 90th and 100th   percentile for age-matched, asymptomatic, apparently healthy population.    2.  Diffuse mild to moderate coronary artery disease involving the   proximal left anterior descending, proximal circumflex coronary artery,   and diffuse mixed plaque noted in the entire right coronary artery.  As   mentioned above, all the plaques are mild to moderate in nature, estimated   to be anywhere between 30 and 50% or less.    3.  No occlusive coronary artery disease noted.  4.  Mild to moderate concentric left ventricular hypertrophy with a normal   ejection fraction.  5.  Recommend very aggressive risk factor modification, given the   patient's high risk for having coronary artery disease event in the next   10 years as predicted by the high calcium score and the [place] risk   criteria.      (+) Dyslipidemia --CAD ---Taking blood thinners Instructions Given to  patient: ASA 81 mg. Previous cardiac testing   Echo: Date: 2011 Results:   Upper normal LV size with overall LV systolic function at the lower   limits of normal (estimated ejection fraction of 50%).   Mild concentric LVH.   Mild septal asynchrony.   Moderate left atrial dilatation.   Mild aortic valve sclerosis without stenosis.   Mild mitral regurgitation.   Right ventricular systolic pressure estimated to be mildly elevated at 36   mmHg + RAP.    Stress Test: Date: Results:    ECG Reviewed: Date: 9/1/20 Results:  Normal sinus rhythm  Right bundle branch block  Abnormal ECG  When compared with ECG of 29-JUL-2020 21:24,  MT interval has decreased  Ventricular rate 76 bpm    Cath: Date: Results:   (-) hypertension   METS/Exercise Tolerance: 4 - Raking leaves, gardening Comment: Activity somewhat limited by hip pain   Hematologic:  - neg hematologic  ROS  (-) history of blood clots and history of blood transfusion   Musculoskeletal: Comment: B/L shoulder pain    Right hip pain      GI/Hepatic: Comment: Hx ETOH abuse, sober since 11/2019     (-) GERD and liver disease   Renal/Genitourinary: Comment: Urinary incontinence        Endo:  - neg endo ROS  (-) Type II DM   Psychiatric/Substance Use:       Infectious Disease:  - neg infectious disease ROS     Malignancy:   (+) Malignancy, History of Lung.  Lung CA Active status post.        Other:            Physical Exam    Airway        Mallampati: II   TM distance: > 3 FB   Neck ROM: limited   Mouth opening: > 3 cm    Respiratory Devices and Support         Dental       (+) upper dentures      Cardiovascular          Rhythm and rate: regular and normal   (+) murmur       Pulmonary       Comment: Mild expiratory wheeze at base B/L            OUTSIDE LABS:  CBC:   Lab Results   Component Value Date    WBC 7.7 04/06/2021    WBC 7.0 08/27/2019    HGB 13.5 04/06/2021    HGB 13.7 08/27/2019    HCT 40.2 04/06/2021    HCT 41.0 08/27/2019     04/06/2021     08/27/2019      BMP:   Lab Results   Component Value Date     04/06/2021     05/10/2019    POTASSIUM 3.8 04/06/2021    POTASSIUM 4.0 05/10/2019    CHLORIDE 107 04/06/2021    CHLORIDE 107 05/10/2019    CO2 27 04/06/2021    CO2 25 05/10/2019    BUN 18 04/06/2021    BUN 21 05/10/2019    CR 0.88 04/06/2021    CR 0.73 05/10/2019     (H) 04/06/2021    GLC 77 05/10/2019     COAGS: No results found for: PTT, INR, FIBR  POC: No results found for: BGM, HCG, HCGS  HEPATIC:   Lab Results   Component Value Date    ALBUMIN 4.0 04/06/2021    PROTTOTAL 7.5 04/06/2021    ALT 18 04/06/2021    AST 11 04/06/2021    ALKPHOS 99 04/06/2021    BILITOTAL 0.2 04/06/2021     OTHER:   Lab Results   Component Value Date    OLGA 9.3 04/06/2021    TSH 2.90 07/18/2009    CRP <2.9 07/10/2018       Anesthesia Plan    ASA Status:  3      Anesthesia Type: General.     - Airway: ETT   Induction: Intravenous.   Maintenance: Balanced.   Techniques and Equipment:     - Airway: Video-Laryngoscope, Double lumen ETT     - Lines/Monitors: 2nd IV, Arterial Line, BIS     Consents            Postoperative Care    Pain management: IV analgesics, Multi-modal analgesia.   PONV prophylaxis: Ondansetron (or other 5HT-3), Dexamethasone or Solumedrol     Comments:              PAC Discussion and Assessment    ASA Classification: 3  Case is suitable for: [place]  Anesthetic techniques and relevant risks discussed: GA  Invasive monitoring and risk discussed: No    Possibility and Risk of blood transfusion discussed: No            PAC Resident/NP Anesthesia Assessment: Kailash Murray is a 66 year old male scheduled to undergo Left thoracoscopic possible Robot-assisted, left lung upper lobe wedge resection, possible segmentectomy, possible lobectomy, mediastinal lymph node dissection. with Omar Mcfarland MD on 6/16/21 at [facility] for treatment of a Lung nodule.      Pt has had prior anesthetic. Type: MAC.    No history  of anesthetic complications     He has the following specific operative considerations:   # AARON 3/8 = intermediate risk  # VTE risk: 3%  # Risk of PONV score = 1.  If > 2, anti-emetic intervention recommended.  # Anesthesia considerations:  Refer to PAC assessment in anesthesia records      CARDIAC: METS 4,  Activity somewhat limited by hip pain     # RCRI : High risk surgery, Coronary Artery Disease.  6.6% risk of major adverse cardiac event.     #  Echo 2011:  EF 50%, Moderate left atrial dilatation, Mild aortic valve sclerosis without stenosis, Right ventricular systolic pressure estimated to be mildly elevated at 36 mmHg + RAP.     #  CT Angio 2011: Diffuse mild to moderate CAD involving the proximal LAD and PCx, and diffuse mixed plaque noted in the entire RCA.  Plaques are mild to moderate in nature, estimated to be anywhere between 30 and 50% or less.  High risk for having coronary artery disease event in the next 10 years as predicted by the high calcium score and the [place] risk criteria.    # BNP today:  188    # Dobutamine stress test today --->> Normal dobutamine stress echocardiogram without evidence of inducible ischemia.    PULMONARY:     # Current smoker, 49 pk yr hx, smokes < 1/2 ppd    # PFT 4/13/21:  Moderate Airflow Obstruction without a significant bronchodilator response.   There is air trapping without hyperinflation.   Normal diffusing capacity.     # Denies inhaler use    GI:     # Denies GERD    /RENAL:     # CKD per chart review. Creatinine 0.75, GFR >90 today    ENDO: BMI 26    # No DM    ORTHO:     # Mildly limited extension ROM of neck    NEURO/PSYCH:     # Hx ETOH abuse, sober since 11/2019    Patient was discussed with Dr Castellanos.    Patient is optimized and is acceptable candidate for the proposed procedure. No further diagnostic evaluation is needed.    Final plan per anesthesiologist on day of surgery.     Reviewed and Signed by PAC Mid-Level Provider/Resident  Mid-Level  Provider/Resident: Ghada Duran PA-C  Date: 6/14/21  Time: 1543                               Ghada Duran PA-C

## 2021-06-14 NOTE — H&P (VIEW-ONLY)
Pre-Operative H & P     CC:  Preoperative exam to assess for increased cardiopulmonary risk while undergoing surgery and anesthesia.    Date of Encounter: 6/14/2021  Primary Care Physician:  Carolyne Cornejo  Reason for Visit: Lung nodule       HPI     Kailash Murray is a 67 y/o male who presents for pre-operative H&P in preparation for Left thoracoscopic possible Robot-assisted, left lung upper lobe wedge resection, possible segmentectomy, possible lobectomy, mediastinal lymph node dissection. with Omar Mcfarland MD on 6/16/21 at [facility] for treatment of a Lung nodule. Patient is being evaluated for comorbid conditions of HTN, HLD, COPD, depression, hx ETOH abuse (in remission), CKD, cervical radiculopathy.    Mr. Murray has a left lung upper lobe nodule discovered on lung cancer screening. He has no pulmonary complaints. He is able to walk 2-3 blocks without issues and easily climb 2-3 flights of stairs. He stopped drinking in June 2020 and has gained 22 pounds since then. He now presents for the above procedure.    History was obtained from patient & chart review.      Past Medical History  Past Medical History:   Diagnosis Date     Adenomatous polyp 5-2013    needs colonsocpy every 5 years     Alcohol dependence (H)      Asthma      Cervical radiculopathy      CHF (congestive heart failure) (H) 1-2011     COPD (chronic obstructive pulmonary disease) (H)      Coronary artery calcification      DDD (degenerative disc disease), cervical      DDD (degenerative disc disease), lumbar      DDD (degenerative disc disease), lumbosacral      Dupuytren's contracture 3/2/2015     Dyslipidemia      Elevated fasting glucose      ETOHism (H)      HTN (hypertension)      Lumbar spinal stenosis      Moderate major depression (H) 11/9/2011     Renal insufficiency      Trigger finger, acquired 3/2/2015       Past Surgical History  Past Surgical History:   Procedure Laterality  Date     CATARACT IOL, RT/LT  8/17/2010    left     CATARACT IOL, RT/LT  8/31/2010    right     COLONOSCOPY  2013     COLONOSCOPY WITH CO2 INSUFFLATION N/A 6/8/2018    Procedure: COLONOSCOPY WITH CO2 INSUFFLATION;  V76.51 (ICD-9-CM) - Z12.11 (ICD-10-CM) - Screen for colon cancer  BMI 22.84  BCBS/, patient refused taxid# and Dr GILLESPIE#  Dr Vitale is referring doctor  [facility] on [facility]- Fax# [phone];  Surgeon: Duane, William Charles, MD;  Location: [facility] OR     SURGICAL HISTORY OF -   1991    cyst removal from back        Hx of Blood transfusions/reactions: denies     Hx of abnormal bleeding or anti-platelet use: on ASA 81 mg    Menstrual history: No LMP for male patient.:      Steroid use in the last year: denies    Personal or FH with difficulty with Anesthesia:  denies    Prior to Admission Medications  Current Outpatient Medications   Medication Sig Dispense Refill     aspirin 81 MG tablet Take 1 tablet (81 mg) by mouth daily (Patient taking differently: Take 81 mg by mouth every morning ) 30 tablet 4     cyanocobalamin (VITAMIN B-12) 1000 MCG tablet Take 1,000 mcg by mouth every morning        Glucosamine-Chondroitin (OSTEO BI-FLEX REGULAR STRENGTH PO) Take by mouth every morning       magnesium 250 MG tablet Take 1 tablet by mouth every morning       vitamin C (ASCORBIC ACID) 250 MG tablet Take 250 mg by mouth every morning        atorvastatin (LIPITOR) 40 MG tablet Take 1 tablet (40 mg) by mouth daily (Patient taking differently: Take 40 mg by mouth daily ) 90 tablet 3     atorvastatin (LIPITOR) 40 MG tablet Take 1 tablet (40 mg) by mouth daily (Patient not taking: Reported on 3/24/2021) 90 tablet 3     methylPREDNISolone (MEDROL) 32 MG tablet Take 1 tablet (32 mg) by mouth daily Take one tablet by mouth 12 hours prior to your CT. Take the second tablet by mouth 2 hours prior to your CT. (Patient taking differently: Take 32 mg by mouth daily Take one tablet by mouth 12 hours prior to  your CT. Take the second tablet by mouth 2 hours prior to your CT.) 2 tablet 0     Multiple Vitamin (MULTI-DAY PO)          Allergies  Allergies   Allergen Reactions     Coreg [Carvedilol]      With alcohol caused him to be unresponsive     Dye [Contrast Dye]      Rash and hypotension     Hctz      syncope and dehydration with alcohol use     Paxil [Paroxetine]      SLEEPY     Pcn [Penicillins] Rash     Triple Antibiotic Rash       Social History  Social History     Socioeconomic History     Marital status:      Spouse name: Not on file     Number of children: 6     Years of education: Not on file     Highest education level: Not on file   Occupational History     Occupation: /jered     Employer: [employer]   Social Needs     Financial resource strain: Not on file     Food insecurity     Worry: Not on file     Inability: Not on file     Transportation needs     Medical: Not on file     Non-medical: Not on file   Tobacco Use     Smoking status: Current Every Day Smoker     Packs/day: 1.00     Years: 40.00     Pack years: 40.00     Types: Cigarettes     Start date: 3/20/1972     Smokeless tobacco: Never Used   Substance and Sexual Activity     Alcohol use: Not Currently     Alcohol/week: 14.0 standard drinks     Types: 14 Shots of liquor per week     Comment: history of abuse      Drug use: No     Sexual activity: Yes     Partners: Female     Birth control/protection: None   Lifestyle     Physical activity     Days per week: Not on file     Minutes per session: Not on file     Stress: Not on file   Relationships     Social connections     Talks on phone: Not on file     Gets together: Not on file     Attends Judaism service: Not on file     Active member of club or organization: Not on file     Attends meetings of clubs or organizations: Not on file     Relationship status: Not on file     Intimate partner violence     Fear of current or ex partner: Not on file     Emotionally abused: Not on file      Physically abused: Not on file     Forced sexual activity: Not on file   Other Topics Concern     Parent/sibling w/ CABG, MI or angioplasty before 65F 55M? Yes     Comment: father, older sis; older brother  at 60 of heart attack   Social History Narrative     Not on file       Family History  Family History   Problem Relation Age of Onset     Cancer Mother      Cerebrovascular Disease Mother      Thyroid Disease Mother      Other Cancer Mother      Heart Disease Father 50        MI      Hypertension Father      Hyperlipidemia Father      Coronary Artery Disease Father      Deep Vein Thrombosis (DVT) Father      Diabetes Sister      Heart Disease Sister         Had a heart valv replaced      Coronary Artery Disease Sister      Hypertension Sister      Anxiety Disorder Sister      Obesity Sister      Chemical Addiction Brother      Diabetes Brother          age 63, MI     Substance Abuse Brother      Unknown/Adopted Maternal Grandmother      Unknown/Adopted Maternal Grandfather      Unknown/Adopted Paternal Grandmother      Unknown/Adopted Paternal Grandfather      Anxiety Disorder Brother      Anxiety Disorder Brother      Anesthesia Reaction No family hx of             ROS/MED HX  The complete review of systems is negative other than noted in the HPI or here.  Patient denies recent illness, fever and respiratory infection during past month.  Pt denies steroid use during past year.    ENT/Pulmonary: Comment: PFT 21:  Moderate Airflow Obstruction without a significant bronchodilator response.   There is air trapping without hyperinflation.   Normal diffusing capacity.     49 pk yr hx, smokes <1/2 ppd      (+) tobacco use, Current use, COPD,  (-) sleep apnea   Neurologic:  - neg neurologic ROS   (+) no peripheral neuropathy  (-) no seizures and no CVA   Cardiovascular: Comment: CT Angio :  1.  Calcium score of 600 places the patient between the 90th and 100th   percentile for age-matched, asymptomatic,  apparently healthy population.    2.  Diffuse mild to moderate coronary artery disease involving the   proximal left anterior descending, proximal circumflex coronary artery,   and diffuse mixed plaque noted in the entire right coronary artery.  As   mentioned above, all the plaques are mild to moderate in nature, estimated   to be anywhere between 30 and 50% or less.    3.  No occlusive coronary artery disease noted.  4.  Mild to moderate concentric left ventricular hypertrophy with a normal   ejection fraction.  5.  Recommend very aggressive risk factor modification, given the   patient's high risk for having coronary artery disease event in the next   10 years as predicted by the high calcium score and the [place] risk   criteria.      (+) Dyslipidemia --CAD ---Taking blood thinners Instructions Given to patient: ASA 81 mg. Previous cardiac testing   Echo: Date: 2011 Results:   Upper normal LV size with overall LV systolic function at the lower   limits of normal (estimated ejection fraction of 50%).   Mild concentric LVH.   Mild septal asynchrony.   Moderate left atrial dilatation.   Mild aortic valve sclerosis without stenosis.   Mild mitral regurgitation.   Right ventricular systolic pressure estimated to be mildly elevated at 36   mmHg + RAP.    Stress Test: Date: Results:    ECG Reviewed: Date: 9/1/20 Results:  Normal sinus rhythm  Right bundle branch block  Abnormal ECG  When compared with ECG of 29-JUL-2020 21:24,  [place] interval has decreased  Ventricular rate 76 bpm    Cath: Date: Results:   (-) hypertension   METS/Exercise Tolerance: 4 - Raking leaves, gardening Comment: Activity somewhat limited by hip pain   Hematologic:  - neg hematologic  ROS  (-) history of blood clots and history of blood transfusion   Musculoskeletal: Comment: B/L shoulder pain    Right hip pain      GI/Hepatic: Comment: Hx ETOH abuse, sober since 11/2019     (-) GERD and liver disease   Renal/Genitourinary: Comment: Urinary  "incontinence        Endo:  - neg endo ROS  (-) Type II DM   Psychiatric/Substance Use:       Infectious Disease:  - neg infectious disease ROS     Malignancy:   (+) Malignancy, History of Lung.  Lung CA Active status post.        Other:                  Temp: 97.9  F (36.6  C) Temp src: Oral BP: 119/76 Pulse: 73   Resp: 16 SpO2: 94 %         178 lbs 11.2 oz  5' 9\"[pt reported[   Body mass index is 26.39 kg/m .       Physical Exam  Constitutional: Awake, alert, cooperative, no apparent distress, and appears stated age.  Eyes: Pupils equal, round and reactive to light, extra ocular muscles intact, sclera clear, conjunctiva normal.  HENT: Normocephalic, oral pharynx with moist mucus membranes, good dentition. No goiter appreciated. No removable dental hardware.  Respiratory: Clear to auscultation bilaterally, no crackles or wheezing. No SOB when supine.  Cardiovascular: Regular rate and rhythm, normal S1 and S2, and no murmur noted.  Carotids +2, no bruits. No edema. Palpable pulses to radial, DP and PT arteries.   GI: Normal bowel sounds, soft, non-distended, non-tender, no masses palpated.    Lymph/Hematologic: No cervical lymphadenopathy and no supraclavicular lymphadenopathy.  Genitourinary:  deferred  Skin: Warm and dry.  No rashes.   Musculoskeletal: Mildly limited extension ROM of neck. There is no redness, warmth, or swelling of the joints. Gross motor strength is normal.    Neurologic: Awake, alert, oriented to name, place and time. Cranial nerves II-XII are grossly intact. Gait is normal. Ambulates from chair to exam table, seats self, lies supine and sits back up w/o assistance.  Neuropsychiatric: Calm, cooperative. Normal affect. Pleasant. Answers questions appropriately, follows commands w/o difficulty.        PRIOR LABS/DIAGNOSTIC STUDIES:    All labs and imaging personally reviewed      PFT 4/13/21  FVC-Pred 4.26    L  04/13/2021  2:19    FVC-Pre 3.25    L  04/13/2021  2:19    FVC-%Pred-Pre " 76    %  04/13/2021  2:19    FEV1-Pre 2.19    L  04/13/2021  2:19    FEV1-%Pred-Pre 67    %  04/13/2021  2:19    FEV1FVC-Pred 77    %  04/13/2021  2:19    FEV1FVC-Pre 68    %  04/13/2021  2:19    FEFMax-Pred 8.51    L/sec  04/13/2021  2:19    FEFMax-Pre 5.10    L/sec  04/13/2021  2:19    FEFMax-%Pred-Pre 59    %  04/13/2021  2:19    FEF2575-Pred 2.59    L/sec  04/13/2021  2:19    FEF2575-Pre 1.30    L/sec  04/13/2021  2:19    RYL5114-%Pred-Pre 50    %  04/13/2021  2:19    FEF2575-Post 1.86    L/sec  04/13/2021  2:19    MFM0600-%Pred-Post 71    %  04/13/2021  2:19    ExpTime-Pre 7.09    sec  04/13/2021  2:19    FIFMax-Pre 3.09    L/sec  04/13/2021  2:19    VC-Pred 4.68    L  04/13/2021  2:19    VC-Pre 3.56    L  04/13/2021  2:19    VC-%Pred-Pre 76    %  04/13/2021  2:19    IC-Pred 3.48    L  04/13/2021  2:19    IC-Pre 3.26    L  04/13/2021  2:19    IC-%Pred-Pre 93    %  04/13/2021  2:19    ERV-Pred 1.20    L  04/13/2021  2:19    ERV-Pre 0.30    L  04/13/2021  2:19    ERV-%Pred-Pre 25    %  04/13/2021  2:19    FEV1FEV6-Pred 78    %  04/13/2021  2:19    FEV1FEV6-Pre 69    %  04/13/2021  2:19    FRCPleth-Pred 3.61    L  04/13/2021  2:19    FRCPleth-Pre 4.08    L  04/13/2021  2:19    FRCPleth-%Pred-Pre 113    %  04/13/2021  2:19    RVPleth-Pred 2.52    L  04/13/2021  2:19    RVPleth-Pre 3.77    L  04/13/2021  2:19    RVPleth-%Pred-Pre 149    %  04/13/2021  2:19    TLCPleth-Pred 6.92    L  04/13/2021  2:19    TLCPleth-Pre 7.33    L  04/13/2021  2:19    TLCPleth-%Pred-Pre 105    %  04/13/2021  2:19    DLCOunc-Pred 25.78    ml/min/mmHg  04/13/2021  2:19    DLCOunc-Pre 19.81    ml/min/mmHg  04/13/2021  2:19    DLCOunc-%Pred-Pre 76    %  04/13/2021  2:19    DLCOcor-Pre 20.47    ml/min/mmHg  04/13/2021   2:19    DLCOcor-%Pred-Pre 79    %  04/13/2021  2:19    VA-Pre 5.19    L  04/13/2021  2:19    VA-%Pred-Pre 83    %  04/13/2021  2:19    FEV1SVC-Pred 70    %  04/13/2021  2:19    FEV1SVC-Pre 62    %  04/13/2021  2:19      The FVC is normal.  FEV1 and FEV1/FEV6 ratio are reduced.  The inspiratory flow rates are reduced.  While the TLC is within normal limits, the RV is increased.  Following administration of bronchodilators, there is no significant response.  The diffusing    capacity is normal.   IMPRESSION:   Moderate Airflow Obstruction without a significant bronchodilator response.   There is air trapping without hyperinflation.   Normal diffusing capacity.          CT CHEST LUNG CANCER SCREEN LOW DOSE WITHOUT CONTRAST March 29, 2021  IMPRESSION:   1. ACR Assessment Category:  Lung-RADS Category 4B. Suspicious.  Recommendation:  Chest CT with contrast  (please use exam code IMG  202) or without contrast (please use exam code ), PET/CT and/or  tissue sampling depending on the probability of malignancy and  comorbidities, PET/CT may be used when there is a >/= 8 mm solid  component. This category is given as there is a new irregular nodular  opacity at the left upper lobe with a maximal size of 0.8. Further  oncologic workup is recommended .   2. Significant Incidental Finding(s):  Category S: Yes. Moderate  coronary artery calcifications.  3. Stable mild peribronchial wall thickening bilaterally.          EKG 9/1/20  Normal sinus rhythm  Right bundle branch block  Abnormal ECG  When compared with ECG of 29-JUL-2020 21:24,  [place] interval has decreased  Ventricular rate 76 bpm      ECHOCARDIOGRAM 2011  Final Conclusion   Upper normal LV size with overall LV systolic function at the lower   limits of normal (estimated ejection fraction of 50%).   Mild concentric LVH.   Mild septal asynchrony.   Moderate left atrial dilatation.   Mild aortic valve sclerosis without stenosis.    Mild mitral regurgitation.   Right ventricular systolic pressure estimated to be mildly elevated at 36   mmHg + RAP.      CT ANGIO 2011  CT CORONARY ARTERIAL ANGIOGRAM 2/9/2011  CONCLUSION:  1.  Calcium score of 600 places the patient between the 90th and 100th   percentile for age-matched, asymptomatic, apparently healthy population.    2.  Diffuse mild to moderate coronary artery disease involving the   proximal left anterior descending, proximal circumflex coronary artery,   and diffuse mixed plaque noted in the entire right coronary artery.  As   mentioned above, all the plaques are mild to moderate in nature, estimated   to be anywhere between 30 and 50% or less.    3.  No occlusive coronary artery disease noted.  4.  Mild to moderate concentric left ventricular hypertrophy with a normal   ejection fraction.  5.  Recommend very aggressive risk factor modification, given the   patient's high risk for having coronary artery disease event in the next   10 years as predicted by the high calcium score and the [place] risk   criteria.      CBC:   Lab Results   Component Value Date    WBC 7.7 04/06/2021    WBC 7.0 08/27/2019    HGB 13.5 04/06/2021    HGB 13.7 08/27/2019    HCT 40.2 04/06/2021    HCT 41.0 08/27/2019     04/06/2021     08/27/2019     BMP:   Lab Results   Component Value Date     04/06/2021     05/10/2019    POTASSIUM 3.8 04/06/2021    POTASSIUM 4.0 05/10/2019    CHLORIDE 107 04/06/2021    CHLORIDE 107 05/10/2019    CO2 27 04/06/2021    CO2 25 05/10/2019    BUN 18 04/06/2021    BUN 21 05/10/2019    CR 0.88 04/06/2021    CR 0.73 05/10/2019     (H) 04/06/2021    GLC 77 05/10/2019     COAGS: No results found for: PTT, INR, FIBR  POC: No results found for: BGM, HCG, HCGS  HEPATIC:   Lab Results   Component Value Date    ALBUMIN 4.0 04/06/2021    PROTTOTAL 7.5 04/06/2021    ALT 18 04/06/2021    AST 11 04/06/2021    ALKPHOS 99 04/06/2021    BILITOTAL 0.2 04/06/2021      OTHER:   Lab Results   Component Value Date    OLGA 9.3 04/06/2021    TSH 2.90 07/18/2009    CRP <2.9 07/10/2018     Labs/Diagnostic Studies today: (personally reviewed)  BMP, CBC, BNP, T&S, Stress test    Sodium 133 - 144 mmol/L 137      Potassium 3.4 - 5.3 mmol/L 4.1     Chloride 94 - 109 mmol/L 110High      Carbon Dioxide 20 - 32 mmol/L 25     Anion Gap 3 - 14 mmol/L <1Low      Glucose 70 - 99 mg/dL 139High      Urea Nitrogen 7 - 30 mg/dL 20     Creatinine 0.66 - 1.25 mg/dL 0.75     GFR Estimate >60 mL/min/ >90    Comment: Non  GFR Calc   Starting 12/18/2018, serum creatinine based estimated GFR (eGFR) will be   calculated using the Chronic Kidney Disease Epidemiology Collaboration   (CKD-EPI) equation.     GFR Estimate If Black >60 mL/min/ >90    Comment:  GFR Calc   Starting 12/18/2018, serum creatinine based estimated GFR (eGFR) will be   calculated using the Chronic Kidney Disease Epidemiology Collaboration   (CKD-EPI) equation.     Calcium 8.5 - 10.1 mg/dL 9.3        WBC 4.0 - 11.0 10e9/L 15.1High       RBC Count 4.4 - 5.9 10e12/L 4.83     Hemoglobin 13.3 - 17.7 g/dL 13.8     Hematocrit 40.0 - 53.0 % 41.6     MCV 78 - 100 fl 86     MCH 26.5 - 33.0 pg 28.6     MCHC 31.5 - 36.5 g/dL 33.2     RDW 10.0 - 15.0 % 14.3     Platelet Count 150 - 450 10e9/L 225        N-Terminal Pro Bnp 0 - 125 pg/mL 188High           Outside records reviewed from: Care Everywhere (EKG, echocardiogram, CT angio @ [facility])    ASSESSMENT and PLAN  Kailash Murray is a 66 year old male scheduled to undergo Left thoracoscopic possible Robot-assisted, left lung upper lobe wedge resection, possible segmentectomy, possible lobectomy, mediastinal lymph node dissection. with Omar Mcfarland MD on 6/16/21 at [facility] for treatment of a Lung nodule.      Pt has had prior anesthetic. Type: MAC.    No history of anesthetic complications     He has the following specific  operative considerations:   # AARON 3/8 = intermediate risk  # VTE risk: 3%  # Risk of PONV score = 1.  If > 2, anti-emetic intervention recommended.  # Anesthesia considerations:  Refer to PAC assessment in anesthesia records      CARDIAC: METS 4,  Activity somewhat limited by hip pain     # RCRI : High risk surgery, Coronary Artery Disease.  6.6% risk of major adverse cardiac event.     #  Echo 2011:  EF 50%, Moderate left atrial dilatation, Mild aortic valve sclerosis without stenosis, Right ventricular systolic pressure estimated to be mildly elevated at 36 mmHg + RAP.     #  CT Angio 2011: Diffuse mild to moderate CAD involving the proximal LAD and PCx, and diffuse mixed plaque noted in the entire RCA.  Plaques are mild to moderate in nature, estimated to be anywhere between 30 and 50% or less.  High risk for having coronary artery disease event in the next 10 years as predicted by the high calcium score and the [place] risk criteria.    # BNP today:  188    # Dobutamine stress test today --->> Normal dobutamine stress echocardiogram without evidence of inducible ischemia.    PULMONARY:     # Current smoker, 49 pk yr hx, smokes < 1/2 ppd    # PFT 4/13/21:  Moderate Airflow Obstruction without a significant bronchodilator response.   There is air trapping without hyperinflation.   Normal diffusing capacity.     # Denies inhaler use    GI:     # Denies GERD    /RENAL:     # CKD per chart review. Creatinine 0.75, GFR >90 today    ENDO: BMI 26    # No DM    ORTHO:     # Mildly limited extension ROM of neck    NEURO/PSYCH:     # Hx ETOH abuse, sober since 11/2019    Patient was discussed with Dr Castellanos.    Patient is optimized and is acceptable candidate for the proposed procedure. No further diagnostic evaluation is needed.    Final plan per anesthesiologist on day of surgery.     Arrival time, NPO, shower and medication instructions provided by nursing staff today.  Preparing For Your Surgery handout given.    58  minutes spent on the date of the encounter doing chart review, history and exam, documentation and further activities as noted below:    Prep time: 14 minutes  Visit time: 29 minutes  Documentation time: 15 minutes      Ghada Duran PA-C  Preoperative Assessment Center  [facility]  Phone: [phone]  Fax: [phone]

## 2021-06-16 NOTE — ANESTHESIA PROCEDURE NOTES
Airway       Patient location during procedure: OR  Staff -        Anesthesiologist:  Malik Hilario MD       Resident/Fellow: Deysi Zamora MD       Performed By: resident  Consent for Airway        Urgency: elective  Indications and Patient Condition       Indications for airway management: jose-procedural         Mask difficulty assessment: 2 - vent by mask + OA or adjuvant +/- NMBA    Final Airway Details       Final airway type: endotracheal airway       Successful airway: ETT - double lumen left  Endotracheal Airway Details        Cuffed: yes       Successful intubation technique: video laryngoscopy       VL Blade Size: MAC 4       Grade View of Cords: 1       Adjucts: stylet       Bite block used: None       ETT Double lumen (fr): 41    Post intubation assessment        Placement verified by: capnometry, equal breath sounds and chest rise        Number of attempts at approach: 1       Secured with: silk tape       Ease of procedure: easy       Dentition: Intact    Medication(s) Administered   Medication Administration Time: 6/16/2021 11:56 AM

## 2021-06-16 NOTE — PROGRESS NOTES
SPIRITUAL HEALTH SERVICES  [facility] ([place] 3C   PRE-SURGERY VISIT    Had pre-surgery visit with pt and spouse Lola.  Provided spiritual support and reflective conversation. Kailash described feeling at peace with whatever happens next. Lola reported feeling positive about upcoming procedure. I affirmed these feelings of being at peace, and oriented both of them to [facility] and  availability.     Kelsey Strong  Chaplain Resident  Pager: [phone]

## 2021-06-16 NOTE — ANESTHESIA CARE TRANSFER NOTE
Patient: Kailash Murray    Procedure(s):  Robot-Assisted Left Upper Lobe Wedge Resection, Completion Left Upper Lobectomy, Mediastinal Lymph Node Dissection    Diagnosis: Lung nodule [R91.1]  Diagnosis Additional Information: No value filed.    Anesthesia Type:   General     Note:    Oropharynx: oropharynx clear of all foreign objects and spontaneously breathing  Level of Consciousness: awake  Oxygen Supplementation: face mask  Level of Supplemental Oxygen (L/min / FiO2): 6  Independent Airway: airway patency satisfactory and stable  Dentition: dentition unchanged  Vital Signs Stable: post-procedure vital signs reviewed and stable  Report to RN Given: handoff report given  Patient transferred to: PACU    Handoff Report: Identifed the Patient, Identified the Reponsible Provider, Reviewed the pertinent medical history, Discussed the surgical course, Reviewed Intra-OP anesthesia mangement and issues during anesthesia, Set expectations for post-procedure period and Allowed opportunity for questions and acknowledgement of understanding      Vitals: (Last set prior to Anesthesia Care Transfer)  CRNA VITALS  6/16/2021 1717 - 6/16/2021 1752      6/16/2021             Resp Rate (observed):  (!) 3        Electronically Signed By: SAY Lynch CRNA  June 16, 2021  5:52 PM

## 2021-06-16 NOTE — PROGRESS NOTES
"Post Op Check    06/16/2021    Kailash Murray is a 66 year old male with h/o lung cancer now POD#0 s/p robotic RADHA wedge resection with completion lobectomy and mediastinal LN dissection.    Pt reports he is doing well. Mild chest tightness, otherwise pain well controlled. Denies SOB or dizziness. Tolerating clears. No BM. Voiding independently.    /82   Pulse 72   Temp 98  F (36.7  C) (Axillary)   Resp 11   Ht 1.753 m (5' 9\")   Wt 80.2 kg (176 lb 12.9 oz)   SpO2 97%   BMI 26.11 kg/m      Gen: A&O x3, NAD, resting comfortably in bed  Chest: breathing non-labored on room air  Abdomen: soft, non-tender, non-distended  Incision: clean, dry, intact, chest tube with small amount of serosanguinous output  Extremities: warm and well perfused    Post op cxr with small L pneumothorax    A/P: No acute post-op issues. Continue plan of care per primary team.  CT to water seal  Please call with any questions.    Kaushik Mckay MD  Surgery resident  [phone]  "

## 2021-06-16 NOTE — ANESTHESIA PROCEDURE NOTES
Arterial Line Procedure Note  Pre-Procedure   Staff -        Anesthesiologist:  Malik Hilario MD       Performed By: anesthesiologist       Procedure Start/Stop Times: 6/16/2021 12:00 PM and 6/16/2021 12:05 PM       Pre-Anesthestic Checklist: patient identified, IV checked, risks and benefits discussed, informed consent, monitors and equipment checked, pre-op evaluation and at physician/surgeon's request  Timeout:       Correct Patient: Yes        Correct Procedure: Yes        Correct Site: Yes        Correct Position: Yes   Procedure   Procedure: arterial line       Laterality: left       Insertion Site: radial.  Sterile Prep        Standard elements of sterile barrier followed       Skin prep: Chloraprep  Insertion/Injection        Technique: ultrasound guided        1. Ultrasound was used to evaluate the access site.       2. Artery evaluated via ultrasound for patency/adequacy.       3. Using real-time ultrasound the needle/catheter was observed entering the artery/vein.       Catheter Type/Size: 20 G, 1.75 in/4.5 cm quick cath (integral wire)  Narrative         Secured by: other       Tegaderm dressing used.       Complications: Hematoma,        Arterial waveform: Yes        IBP within 10% of NIBP: Yes

## 2021-06-16 NOTE — BRIEF OP NOTE
[facility]    Brief Operative Note    Pre-operative diagnosis: Lung nodule [R91.1]  Post-operative diagnosis Lung cancer    Procedure: Procedure(s):  Robot-Assisted Left Upper Lobe Wedge Resection, Completion Left Upper Lobectomy, Mediastinal Lymph Node Dissection  Surgeon: Surgeon(s) and Role:     * Omar Mcfarland MD - Primary     * Odilon Rouse PA-C - Assisting  Anesthesia: General   Estimated blood loss: 400 ml  Drains: 28 Fr chest tube  Specimens:   ID Type Source Tests Collected by Time Destination   A : 9L Tissue Other SURGICAL PATHOLOGY EXAM Omar Mcfraland MD 6/16/2021  1:24 PM    B : 9L Tissue Other SURGICAL PATHOLOGY EXAM Omar Mcfarland MD 6/16/2021  1:33 PM    C : Level 7 lymph node Tissue Lymph Node SURGICAL PATHOLOGY EXAM Omar Mcfarland MD 6/16/2021  1:57 PM    D : Level 5 lymph node Tissue Lymph Node SURGICAL PATHOLOGY EXAM Omar Mcfarland MD 6/16/2021  2:07 PM    E : 11L lymph node Tissue Lymph Node SURGICAL PATHOLOGY EXAM Odilon Rouse PA-C 6/16/2021  2:37 PM    F : Left Upper Lobe Wedge Tissue Lung, Left Upper Lobe SURGICAL PATHOLOGY EXAM Omar Mcfarland MD 6/16/2021  3:06 PM    G : Completion Left Upper Lobectomy Tissue Lung, Left Upper Lobe SURGICAL PATHOLOGY EXAM Omar Mcfarland MD 6/16/2021  4:26 PM      Findings:   None.  Complications: None.  Implants: * No implants in log *

## 2021-06-17 NOTE — OP NOTE
Preoperative diagnosis:                        Lung nodule  Postoperative diagnosis:                      Lung cancer     Procedure:   1. Left robot-assisted left upper lobe wedge resection, completion left upper lobectomy, mediastinal lymph node dissection     Anesthesia: General   Surgeon: Omar Mcfarland   Assistant:  Odilon Rouse PA-C  EBL: 400 mL     Complications: none immediate     Description of procedure  The patient was brought to the room and placed supine upon the table.  After confirming the patient's identity and verifying the consent, appropriate monitoring devices were placed, as well as SCD boots. General anesthesia was administered.  The patient was intubated with a double-lumen endotracheal tube.  Proper position was confirmed by fiberoptic bronchoscopy.  Intravenous antibiotic was administered within 1 hour prior to the incision. The patient was turned to the right lateral decubitus position and all pressure points were padded. The bed was flexed, and the patient was secured to the table and the left arm was placed on an airplane board.  The left chest was prepped with chlorhexidine and  draped in the standard surgical fashion.       An 8 mm incision was made in the eighth intercostal space in line with the anterior superior iliac spine.  This was carried down to the pleural cavity.  An 8 mm port was placed and an 8 mm, 30-degree thoracoscope was inserted into the pleural cavity.  There were no adhesions.  A 12 mm port was placed anteriorly and another 12 mm port placed posteriorly, approximately 6 cm apart.  A final 8 mm port was placed 6 cm more posterior from the posterior 12 mm port, approximately 8 cm anterior to the spinous processes.  Finally, a 15 mm assistant port was made in the low anterior chest.  The robot was docked without difficulty. Lymph nodes were harvested from levels 5, 7 and 9. The frozen sections were benign. The anterior mediastinal pleura was divided and dissection  continued at the hilum.  The first branch of the pulmonary artery and pulmonary vein were divided and a large wedge was performed using serial fires of the stapler with blue and green loads. The nodule was not located in the specimen, so I made a decision to complete the lobectomy.The superior pulmonary vein was dissected free and divided. The ongoing pulmonary artery was found in the nearly complete fissure and the branches of the pulmonary artery were dissected and serially divided with the stapler with vascular loads.  During the course of dissection, left level 11 nodes were harvested. The upper lobe bronchus was dissected free and divided with a green load after confirming appropriate placement with the inflation.  The completion left upper lobe was brought out through the enlarged assistant port. A non-small cell lung cancer was located in the specimen.  Hemostasis was achieved with pressure and SNOW.  A 28 Telugu chest tube was then placed going posteriorly to the apex  through the initial camera port site.  This was secured to the skin using 0 silk suture.  The lung was observed to inflate appropriately.  The utility incision was closed in layers, irrigating each layer prior to closure.  The smaller incisions were closed in layers. The areas were cleaned and dried and Exofin was applied.      At  the end of the case, sponge, needle, and instrument counts were correct. There were no qualified residents available, so GITA Rouse acted as my assistant.

## 2021-06-17 NOTE — PLAN OF CARE
Admitted/transferred from: PACU  2 RN full   skin assessment completed by Kalina Espinal, RN and Niall TAYLOR RN.  Skin assessment finding: issues found 4 lap sites on L side/back - dermabonded JALEEL, L CT site covered with primapore, small skin tear on L jaw/cheel area JALEEL   Interventions/actions: skin interventions prevenative mepilex in place, monitor bandages, patient educatoin on pressure injury prevention     Will continue to monitor.

## 2021-06-17 NOTE — PROGRESS NOTES
"THORACIC & FOREGUT SURGERY    S:  No overnight events.  Pt seen at bedside resting comfortably.       O:  /62 (BP Location: Left arm)   Pulse 65   Temp 97.8  F (36.6  C) (Oral)   Resp 17   Ht 1.753 m (5' 9\")   Wt 83.3 kg (183 lb 9.6 oz)   SpO2 92%   BMI 27.11 kg/m      A&Ox3, NAD  Breathing non-labored on 2L nc  RRR  Soft, NDNT  Distal extremities appear well perfused    Recent Labs   Lab 06/17/21  0620 06/16/21  2133 06/16/21  1622 06/16/21  1424 06/14/21  0904 06/14/21  0904   WBC 13.9*  --   --   --   --  15.1*   HGB 10.8*  --  11.9* 12.1*   < > 13.8   MCV 87  --   --   --   --  86     --   --   --   --  225     --  140 140   < > 137   POTASSIUM 4.1  --  4.5 4.1   < > 4.1   CHLORIDE 106  --   --   --   --  110*   CO2 27  --   --   --   --  25   BUN 23  --   --   --   --  20   CR 0.80 0.88  --   --   --  0.75   ANIONGAP 5  --   --   --   --  <1*   OLGA 8.3*  --   --   --   --  9.3   *  --  134* 111*   < > 139*    < > = values in this interval not displayed.       A/P: Kailash Murray is a 66 year old male s/p robot-assisted RADHA wedge resection, completion FERMÍN lobectomy and mediastinal lymphadenectomy.  -Pain sched APAP, PRN oxy  -Wean O2 as tolerated  -Remove chest tube  -Encourage ambulation, IS, OOB  -Remove sweet  -Bowel regimen, ADAT  -Lovenox ppx  -Dispo: likely discharge tomorrow pending progress    Odilon Rouse PA-C  Thoracic and Foregut Surgery    "

## 2021-06-17 NOTE — PLAN OF CARE
VSS on 2 LPM oxymask (mouth breather) NC when eating  Neuro: WDL  Pain: mild discomfort at CT site, declines pain meds  CMS: intact  Cardiac: WDL  Resp: wheezing on inspiration, dyspnea on exertion, O2>90 on 2 LPM NC when eating , on cont pulse ox  GI/: voiding adequately, +BM 6/17/2021  Wound: 4 lap sites with dermabond, CT removed today, pressure dressing intact  Access: PIV SL  Activity: Up SBA  Nutrition: Reg diet  Plan: Continue to monitor, plan to discharge tomorrow

## 2021-06-17 NOTE — ANESTHESIA POSTPROCEDURE EVALUATION
Patient: Kailash Murray    Procedure(s):  Robot-Assisted Left Upper Lobe Wedge Resection, Completion Left Upper Lobectomy, Mediastinal Lymph Node Dissection    Diagnosis:Lung nodule [R91.1]  Diagnosis Additional Information: No value filed.    Anesthesia Type:  General    Note:  Disposition: Admission   Postop Pain Control: Uneventful            Sign Out: Well controlled pain   PONV: No   Neuro/Psych: Uneventful            Sign Out: Acceptable/Baseline neuro status   Airway/Respiratory: Uneventful            Sign Out: Acceptable/Baseline resp. status   CV/Hemodynamics: Uneventful            Sign Out: Acceptable CV status; No obvious hypovolemia; No obvious fluid overload   Other NRE: NONE   DID A NON-ROUTINE EVENT OCCUR? No           Last vitals:  Vitals:    06/16/21 1830 06/16/21 1845 06/16/21 1900   BP: 116/67 122/70 113/65   Pulse: 72 76 63   Resp: 8 14 10   Temp:      SpO2: 95% 98% 95%       Last vitals prior to Anesthesia Care Transfer:  CRNA VITALS  6/16/2021 1717 - 6/16/2021 1817      6/16/2021             NIBP:  120/83    ART BP:  108/50    Ht Rate:  82    SpO2:  97 %    Resp Rate (observed):  16    EKG:  Sinus rhythm          Electronically Signed By: Rosalva Palafox MD  June 16, 2021  7:12 PM

## 2021-06-17 NOTE — PLAN OF CARE
"/55   Pulse 61   Temp 96.6  F (35.9  C) (Oral)   Resp 19   Ht 1.753 m (5' 9\")   Wt 80.2 kg (176 lb 12.9 oz)   SpO2 95%   BMI 26.11 kg/m      Reason for admission: POD#1 Left robot-assisted left upper lobe wedge resection, completion left upper lobectomy, mediastinal lymph node dissection  Neuro: A&Ox4, cooperative  Cardiac: WDL, denies chest pain  Respiratory: on RA  GI/: pt voiding adequately via urinal, LBM 6/16  Skin: please see previous note for skin assessment  Pain: pt reports discomfrt at CT site, took scheduled tylenol but no additional pain meds given   LDA: PIV x2 SL, L CT to waterseal   Activity: pt sat at edge of bed several times for using urinal  Diet/Appetite: reg diet, tolerating  Plan: continue POC    "

## 2021-06-17 NOTE — CONSULTS
Smoking Cessation Consult   2021    Patient: Kailash Murray      :  1955                    MRN:[id number]      Lung nodule [R91.1]; Lung cancer (H) [C34.90] @HX    History of Present Illness: Kailash Murray is a 66-year-old male with h/o lung cancer now POD#0 s/p robotic RADHA wedge resection with completion lobectomy and mediastinal LN dissection.    Reason for Consult: Patient identified as current every day smoker per patient chart.     Patient open to sharing about his tobacco use and in learning about more options and resources for quitting, staying quit, and in developing a relapse prevention plan.     Symptoms of craving or withdrawal: Not currently experiencing symptoms of withdrawal such as sleeplessness, headache, anxiety, irritability, and intense cravings to smoke. Open to tobacco cessation counseling but not Nicotine Replacement Therapy while inpatient to help manage symptoms of withdrawal and cravings.     Motivational Interviewing:     MI Intervention: Expressed Empathy/Understanding, Supported Autonomy, Collaboration, Evocation, Permission to raise concern or advise, Open-ended questions, Reflections: simple and complex, Change talk (evoked) and Reframe    Patient shared he has been smoking since the age of 17. Smoked 2 to 2.5PPD for 49 years. Only recently cut down to about 7-10 cigs per day. Never fully quit before. Tried nicotine patches and gums in the past to no avail. Patient also had been a drinker, which went together with smoking. Quit drinking 2020.     Patient here with lung nodule, s/p RADHA wedge resection with completion lobectomy and mediastinal LN dissection 2021.  Per PFTs from 2021, has COPD, moderate airflow obstruction with air trapping--FEV1 67%, Ratio 68%. Not on any inhalers as he hadn t had symptoms prior to surgery. Patient understands the possible connection betweem smoking and his lung condition.     Asked his thoughts and feelings about making a  quit attempt. Shared he wants to quit for good; just not quite sure if he s  mentally prepared yet.  Shared that he s  stubborn ailyn once I make my mind up on something--I get it done. I did this with drinking; I think I can do it again with smoking.  Shared with patient that smoking is mostly behavior--highly ritualized habit. Patient stated that most of the routine habits with smoking  are gone after I quit drinking; don t think this will be a problem.      Offered treatment and support. Open to exploring NRT--combination of patches and gums post-discharge. Open to follow-up calls for support. See below educ and recs.     Patients main reason for smoking include: Strong habit     Top Reasons to quit smoking:   His health     Quit Attempts: none    Types of Tobacco and Amount   Cigarettes X   E-Cigs    Smokeless Tobacco    Cigars    Pipes    Waterpipes      Fagerstrom Test for Nicotine Dependence   How soon after waking do you smoke your first cigarette Within 5 minutes=3  5-30 minutes=2  31-60 minute=1  >61 minutes=0      3        Do you find it difficult to refrain from smoking in places where it is forbidden? e.g. [employer], restaurants, etc? Yes=1  No=0        0   Which cigarette would you hate to give up? The first in the morning=1  Any other=0 1        How many cigarettes a day do you smoke? 10 or less=0  11-20=1  21-30=2  31 or more=3 0   Do you smoke more frequently in the morning?   Yes=1  No=0 1   Do you smoke even if you are sick in bed most of the day? Yes=1  No=0 0                                                                                                                                         Total Score 5   SCORE 1-2 = Low Dependence          3-4 = Low to Mod Dependence     5-7 = Moderate Dependence       8+ = High Dependence     Stage of Behavior Change:   Pre-contemplation - No intention    Contemplation - Change on the horizon X   Preparation - Getting Ready    Action - Consistently changed  "(within 6 months)    Maintenance - Staying quit (more than 6 months)    Relapse - Recycling      Patients Motivation to Quit Scale    Importance (1-10): 10   Confidence  (1-10): 10   Can Patient Imagine a Future without Smoking: YES   Quit Attempt Date:  TBD   Final Quit Date:         Education/Recommendations    Education:    -Provided educational workbook, \"Quitting for Good with Treatment and Support.\" Discussed health risks of continued smoking. Provided motivation and encouragement.  Shared that it's never too late to quit and that the benefits are immediate and profoundly impactful. Shared that slipping up here and there doesn't necessarily mean he failed; rather, it's an opportunity to reflect and modify his behaviors and habits and to employ alternate strategies to deal with strong triggers.    Recommendations:   -Encouraged patient to use workbook to help him understand why he smokes, come up with 5 reasons to quit, and to imagine what his future looks like without smoking. Encouraged him to develop strategies to distract himself to get past cravings.     Developed Smoking Cessation Relapse Prevention Plan that includes recommendations of:     **Discussion with his PCP about the possibility of Wellbutrin (bupropion) or Chantix (varenicline); PCP to provide prescription and monitoring after weighing indications and contraindications. To be used as prescribed after establishing Target Quit Date.    **Use of 7/14/21 mg patch daily, continue the initial patch for 4-6 weeks of smoking abstinence based on withdrawal symptoms. Taper every 4-6 weeks in 7 mg steps as tolerated.     **Use of 2 mg gum, take first thing in the morning and as needed for cravings and urges to smoke. May be used every 1-2 hours.     **Participation in our post-hosp smoking cessation follow-up calls for treatment and support, starting at 48 hrs post discharge, then at 14 days, 1 month, and at 6 months.     Time Spent: I spent 45 minutes " with patient. Will notify provider of recommendations.    Gave contact information and instructions on how he could get hold of us with questions or concerns.     Burton Dick, RRT, CTTS  Chronic Pulmonary Disease Specialist  Cardiopulmonary Services   Office: [phone]  Pager: [phone]

## 2021-06-18 NOTE — PLAN OF CARE
"[id number]    /70 (BP Location: Right arm)   Pulse 75   Temp 97  F (36.1  C) (Oral)   Resp 17   Ht 1.753 m (5' 9\")   Wt 84.1 kg (185 lb 6.4 oz)   SpO2 94%   BMI 27.38 kg/m      Activity: up a francisco   Neuros: alert and orientated x 4, calm and cooperative   Cardiac: WNL  Respiratory: O2 sats 94%on RA, unlabored    GI/: abdomen soft/non-tender, voiding spont (not saving)   Diet: regular   Lines: PIV   Incisions/Drains: left chest inc's CDI, old chest tube site dressing CDI   Labs: reviewed  Pain/nausea: denied need for pain meds/no nausea    New changes this shift: none    Plan: likely discharge to home today     "

## 2021-06-18 NOTE — PLAN OF CARE
Occupational Therapy Discharge Summary    Reason for therapy discharge:    Discharged to home.    Progress towards therapy goal(s). See goals on Care Plan in [facility] electronic health record for goal details.  Goals partially met.  Barriers to achieving goals:   discharge from facility.    Therapy recommendation(s):    No further therapy is recommended.

## 2021-06-18 NOTE — PLAN OF CARE
End of Shift Summary. See flowsheets for vital signs and detailed assessment.    Changes this shift: Chest tube pulled dayshift. Pt denies pain. Weaned off oxygen. Ambulated several times in room. Ambulated once down grajeda, 02 sats after ambulating down hallway off oxygen was 93%. Denies SOB. Pain controlled with scheduled ibuprofen and tyelenol    Plan:  Possible discharge home in the am.

## 2021-06-18 NOTE — PLAN OF CARE
Vitals:    06/17/21 1700 06/17/21 2027 06/17/21 2246 06/18/21 0912   BP:  124/55 117/70 126/60   BP Location:  Right arm Right arm Right arm   Pulse:  68 75 61   Resp:  17 17 16   Temp:  97.6  F (36.4  C) 97  F (36.1  C) 96.1  F (35.6  C)   TempSrc:  Oral Oral Oral   SpO2: 94% 94% 94% 95%   Weight:   84.1 kg (185 lb 6.4 oz)    Height:        Afebrile vital stable, sating 95% on room air, lap site intact,   Chest tube is out, discharge script written, teaching has been done,   Follow up appointment is set up. Medication script  sent to discharge pharmacy,  Pt is discharge home.

## 2021-06-18 NOTE — DISCHARGE SUMMARY
ADDENDUM: Final pathology showed poorly differentiated adeno, pT1bN0 stage IA2, R0 resection. No need for adjuvant therapy.     Naren Goel MD        NAME: Kailash Murray   MRN: [id number]   : 1955     DATE OF ADMISSION: 2021     Preoperative diagnosis:                        Lung nodule  Postoperative diagnosis:                      Lung cancer     Procedure:   1. Left robot-assisted left upper lobe wedge resection, completion left upper lobectomy, mediastinal lymph node dissection    PATHOLOGY RESULTS: Final pathology pending at time of discharge.     CULTURE RESULTS: None     INTRAOPERATIVE COMPLICATIONS: None     POSTOPERATIVE COMPLICATIONS: None     DRAINS/TUBES PRESENT AT DISCHARGE: None    DATE OF DISCHARGE:  2021     HOSPITAL COURSE: Kailash Murray is a 66 year old male who on 2021 underwent the above-named procedures.  He tolerated the operation well and postoperatively was transferred to the general post-surgical unit.  The remainder of his course was essentially uncomplicated.  Prior to discharge, his pain was controlled well, he was able to perform ADLs and ambulate independently without difficulty, and had full return of bowel and bladder function.  On 2021, he was discharged to home in stable condition.    DISCHARGE EXAM:   A&O, NAD  Resp non-labored  Distal extremities warm    Incisions healing well     DISCHARGE INSTRUCTIONS:  Discharge Procedure Orders   X-ray Chest 2 vws*   Standing Status: Future Standing Exp. Date: 21     Order Specific Question Answer Comments   Priority Routine      Reason for your hospital stay   Order Comments: Lung surgery     Adult [facility] Follow-up and recommended labs and tests   Order Comments: 1.) Follow up with primary care physician, Carolyne Cornejo, in 1-2 weeks.  2.) Follow up with a thoracic surgery Clinical Nurse Specialist in Thoracic Surgery clinic in 1 month, prior to which a CXR should be performed.      Appointments on [place] and/or [facility] (with [facility] or [facility] provider or service). Call [phone] if you haven't heard regarding these appointments within 7 days of discharge.     Discharge Instructions   Order Comments: THORACIC SURGERY DISCHARGE INSTRUCTIONS    DIET: Regular diet - as prior to admission     If your plans upon discharge include prolonged periods of sitting (i.e a lengthy car or plane ride), it is highly beneficial to get up and walk at least once per hour to help prevent swelling and blood clots.     You may remove chest tube dressing 48 hours after tube removal and bandage the site at your own discretion thereafter.  Small amounts of leakage are normal for 2-3 days after removal.  Feel free to call with questions.    You may get incision wet 2 days after operation. Do not submerge, soak, or scrub incision or swim until seen in follow-up.    Take incentive spirometer home for continued frequent use    Activity as tolerated, no strenous activity until seen in follow-up, no lifting greater than 20 pounds for the next 1-2 weeks.    Stay hydrated. Take over the counter fiber (metamucil or benefiber) and stool softeners (Miralax, docusate or senna) if becoming constipated.     Call for fever greater than 101.5, chills, increased size of incision, red skin around incision, vision changes, muscle strength changes, sensation changes, shortness of breath, or other concerns.    No driving while taking narcotic pain medication.    Transition to ibuprofen or tylenol/acetaminophen for pain control. Do not take tylenol/acetaminophen and acetaminophen containing narcotic (e.g., percocet or vicodin) at the same time. If you have known ulcer problems, or kidney trouble (elevated creatinine) do not take the ibuprofen.    In emergencies, call 911    For other Questions or Concerns;   A.) During weekday working hours (Monday through Friday 8am to 4:30pm)   call [phone] and ask to speak to a  "clinical nurse specialist.     ILIANA) At nights (after 4:30pm), on weekends, or if urgent call [phone] and   tell the  \"I would like to page job code [phone], the thoracic surgery   fellow on call, please.\"       DISCHARGE MEDICATIONS:   Current Discharge Medication List      START taking these medications    Details   acetaminophen (TYLENOL) 500 MG tablet Take 2 tablets (1,000 mg) by mouth every 6 hours  Qty:      Associated Diagnoses: Lung nodule      enoxaparin ANTICOAGULANT (LOVENOX) 40 MG/0.4ML syringe Inject 0.4 mLs (40 mg) Subcutaneous every 24 hours for 5 days  Qty: 2 mL, Refills: 0    Associated Diagnoses: Lung nodule      oxyCODONE (ROXICODONE) 5 MG tablet Take 1-2 tablets (5-10 mg) by mouth every 4 hours as needed for moderate to severe pain Wean narcotic use as tolerated starting at time of discharge  Qty: 40 tablet, Refills: 0    Associated Diagnoses: Lung nodule      senna-docusate (SENOKOT-S/PERICOLACE) 8.6-50 MG tablet Take 2 tablets by mouth 2 times daily Reduce dose or temporarily discontinue if having loose stools.  Qty: 50 tablet, Refills: 0    Associated Diagnoses: Lung nodule      ibuprofen (ADVIL/MOTRIN) 400 MG tablet Take 1 tablet (400 mg) by mouth every 6 hours  Qty:      Associated Diagnoses: Lung nodule         CONTINUE these medications which have NOT CHANGED    Details   !! atorvastatin (LIPITOR) 40 MG tablet Take 1 tablet (40 mg) by mouth daily  Qty: 90 tablet, Refills: 3    Associated Diagnoses: Hyperlipidemia LDL goal <130; Coronary artery calcification      aspirin 81 MG tablet Take 1 tablet (81 mg) by mouth daily  Qty: 30 tablet, Refills: 4    Associated Diagnoses: Hypertension goal BP (blood pressure) < 140/90      !! atorvastatin (LIPITOR) 40 MG tablet Take 1 tablet (40 mg) by mouth daily  Qty: 90 tablet, Refills: 3    Associated Diagnoses: Hyperlipidemia LDL goal <70      cyanocobalamin (VITAMIN B-12) 1000 MCG tablet Take 1,000 mcg by mouth every morning     "   Glucosamine-Chondroitin (OSTEO BI-FLEX REGULAR STRENGTH PO) Take by mouth every morning      magnesium 250 MG tablet Take 1 tablet by mouth every morning      methylPREDNISolone (MEDROL) 32 MG tablet Take 1 tablet (32 mg) by mouth daily Take one tablet by mouth 12 hours prior to your CT. Take the second tablet by mouth 2 hours prior to your CT.  Qty: 2 tablet, Refills: 0    Associated Diagnoses: Contrast media allergy      vitamin C (ASCORBIC ACID) 250 MG tablet Take 250 mg by mouth every morning        !! - Potential duplicate medications found. Please discuss with provider.

## 2021-06-21 NOTE — PROGRESS NOTES
Clinic Care Coordination Contact    Patient has already been in contact with care team following hospital discharge. Will cancel/close post-hospital call from [facility] at this time.        Lise Duran MA  [facility]

## 2021-06-21 NOTE — TELEPHONE ENCOUNTER
Hospital f/u: RN to call  Pt was hospitalized at [facility] 06/16-06/18/2021  Chief Complaint: Lung Cancer (H), Lung Cancer (H), Lung Nodule

## 2021-06-21 NOTE — TELEPHONE ENCOUNTER
"1st attempt.     ED for acute condition Discharge Protocol    \"Hi, I am calling from [facility].  I am calling to follow up and see how things are going for you after your recent emergency visit.\"    Tell me how you are doing now that you are home?\" Pt states that he is doing okay. He is feeling well. He did get a call this morning from the nurses hotline. He spoke with them a little bit. The only real concern that he has is he is not in pain, but it feels uncomfortable. He has not taken any of the oxy that he has been given. He has taken ibuprofen and tylenol. He has had some trouble sleeping at night due to discomfort. Advised that he could try the oxy to see if it will help him to sleep more comfortable, start with the lowest dose.      Discharge Instructions    \"Let's review your discharge instructions.  What is/are the follow-up recommendations?    1.) Follow up with primary care physician, Carolyne Cornejo, in 1-2 weeks.  2.) Follow up with a thoracic surgery Clinical Nurse Specialist in Thoracic Surgery clinic in 1 month, prior to which a CXR should be performed.     Pt. Response: Pt was agreeable to scheduling with primary care clinic for f/u. He is already scheduled for f/u with CNS on 07/28    \"Has an appointment with your primary care provider been scheduled?\"No (needed - schedule appointment and remind to bring meds)   Scheduled appt for 06/23 with Sammy.    Medications    \"Tell me what changed about your medicines when you discharged?\"    Pt was discharge on the following new medications: acetaminophen, ibuprofen, oxycodone, senna-docusate, and Lovenox injection.    \"What questions do you have about your medications?\"   None      Call Summary    \"What questions or concerns do you have about your recent visit and your follow-up care?\"  none     He is always a pretty positive person with positive attitude. He was disappointed that nothing was placed instructions about when to " "remove his dressing. There are instructions stating, \"You may remove chest tube dressing 48 hours after tube removal and bandage the site at your own discretion thereafter.  Small amounts of leakage are normal for 2-3 days after removal.  Feel free to call with questions.\"  Not sure if this applies to both incisions as pt states that he has two. He will f/u with surgery clinic to clarify. Apologized to patient.      \"If you have questions or things don't continue to improve, we encourage you contact us through the main clinic number (give number).  Even if the clinic is not open, triage nurses are available 24/7 to help you.     We would like you to know that our clinic has extended hours (provide information).  We also have urgent care (provide details on closest location and hours/contact info)\"    \"Thank you for your time and take care!\"    Lucia Jeffery RN  "

## 2021-06-23 NOTE — PROGRESS NOTES
Assessment & Plan   Problem List Items Addressed This Visit     None      Visit Diagnoses     Benign prostatic hyperplasia with urinary frequency    -  Primary    Relevant Medications    tamsulosin (FLOMAX) 0.4 MG capsule    Adenocarcinoma, lung, left (H)        Relevant Orders    XR Chest 2 Views (Completed)         Patient awaiting the final lymph node biopsy reports  Does not need chemo at this time unless the lymph nodes are positive  Wound examined and clean dry intact  There was one of the chest wounds with durabond with some blood accumulation underneath but was not oozing or infected    Spent time reviewing the unique second chance if the lymph nodes are negative and how stopping smoking would be the primary way to reduce future risk  Discussed several strategies for smoking cessation    Patient would like treatement for prostate hypertrophy and urinary frequncy at night           Work on weight loss  Regular exercise    No follow-ups on file.    Kt Christianson MD  [facility] TERA Linder is a 66 year old who presents for the following health issues  accompanied by his spouse:    HPI     Post op; lobectomy of left lung 6/18/2021  Feeling shortness of breath and ot able to sleep   3 5 hour sleeps at night   Wakes up to use the restroom  Falling alseep and staying .  No breathing issues  Smoking 1/2 PPD  No nicotine replacement   Benadryl in the past and sleep   End of July   lovenox ok to stop              Review of Systems   Constitutional, HEENT, cardiovascular, pulmonary, gi and gu systems are negative, except as otherwise noted.      Objective    /64 (BP Location: Right arm, Patient Position: Chair, Cuff Size: Adult Regular)   Pulse 95   Wt 79.4 kg (175 lb)   SpO2 95%   BMI 25.84 kg/m    Body mass index is 25.84 kg/m .  Physical Exam   GENERAL: healthy, alert and no distress  EYES: Eyes grossly normal to inspection, PERRL and conjunctivae and sclerae  normal  HENT: ear canals and TM's normal, nose and mouth without ulcers or lesions  NECK: no adenopathy, no asymmetry, masses, or scars and thyroid normal to palpation  RESP: lungs clear to auscultation - no rales, rhonchi or wheezes  CV: regular rate and rhythm, normal S1 S2, no S3 or S4, no murmur, click or rub, no peripheral edema and peripheral pulses strong  ABDOMEN: soft, nontender, no hepatosplenomegaly, no masses and bowel sounds normal  MS: right chest wall 6 wounds clean dry intact re-dressed   Most anterior had dermabond over and some blood collected underneath   No oozing or infection noted    SKIN: no suspicious lesions or rashes  NEURO: Normal strength and tone, mentation intact and speech normal  BACK: no CVA tenderness, no paralumbar tenderness  PSYCH: mentation appears normal, affect normal/bright    Results for orders placed or performed in visit on 06/23/21   XR Chest 2 Views     Status: None    Narrative    CHEST TWO VIEWS 6/23/2021 4:00 PM     HISTORY: Adenocarcinoma, lung, left (H).    COMPARISON: 6/17/2021       Impression    IMPRESSION: Stable mildly elevated left hemidiaphragm. There are no  acute infiltrates. The cardiac silhouette is not enlarged. Pulmonary  vasculature is unremarkable.    HARI GARCÍA MD

## 2021-07-01 NOTE — TELEPHONE ENCOUNTER
REANNA from hospitals is calling in to give an update on a patient. Reanna is a NP from [facility], and he visited patient at home for wellness exam. Reanna is calling to give update on patient to Dr. Christianson. Pt has a reddened blistery rash located on his torso and the left side of his back that are suspect for Shingles, this has been for the past 5 days. Pt has only 2 specific spots, but also has aching, tenderness and itching.   Pt also has signs and symptoms of PAD, which supports quantiflow results. Intermittent claudication, hair loss on his legs, diminished pulses.     Please call Reanna Galvan at [phone], if you have any further questions.     Mateusz Raines RN on 7/1/2021 at 5:50 PM

## 2021-07-02 NOTE — TELEPHONE ENCOUNTER
Patient can set up virtual visit to assess - although the treatment options for shingles diminish after 48 hours of symptoms   If painful, could consider gabapetin

## 2021-07-07 NOTE — TELEPHONE ENCOUNTER
Post-hosp Smoking Cessation Follow-up   2021    Patient: Kailash Murray      :  1955                    MRN:[id number]        Attempted to reach patient regarding post-hospital smoking cessation counseling follow-up. No answer, voicemail message left requesting a return call.    Burton Dick, RRT, CTTS  Chronic Pulmonary Disease Specialist  Cardiopulmonary Services   Office: [phone]  Pager: [phone]

## 2021-07-22 NOTE — TELEPHONE ENCOUNTER
"Post-hosp Smoking Cessation Follow-up   2021    Patient: Kailash Murray      :  1955                    MRN:[id number]        Called and spoke with Kaialsh for post-hosp smoking cessation counseling follow-up. Kailash stated he is still smoking, although not as much as before the hospital--currently 5-7 cigs per day. Stated he's still contemplating on quitting, just not there yet. Has nicotine gums at home but hasn't decided to use them. Congratulated patient for cutting down. Encouraged patient to revisit his relapse prevention plan. Encouraged patient to refer to the workbook provided, \"Quitting for Good with Treatment and Support,\" for more information. Stated he'll try using the nicotine gums in the next couple of days. Patient asked writer to call him back in a week or so; writer will do just that.     Burton Dick, RRT, CTTS  Chronic Pulmonary Disease Specialist  Cardiopulmonary Services   Office: [phone]  Pager: [phone]   "

## 2021-07-28 NOTE — LETTER
"    7/28/2021         RE: Kailash Murray  [address]  [place] [address]        Dear Colleague,    Thank you for referring your patient, Kailash Murray, to the [facility]. Please see a copy of my visit note below.    Kailash is a 66 year old who is being evaluated via a billable telephone visit.      What phone number would you like to be contacted at? [phone]  How would you like to obtain your AVS? DANIELE Sainz    Phone call duration: 20 minutes    THORACIC SURGERY FOLLOW UP VISIT    Dear Dr. Carolyne Cornejo,  I spoke by telephone to Mr. Murray in follow-up today. The clinical summary follows:     DATE OF ADMISSION: 6/16/2021      Preoperative diagnosis:                        Lung nodule  Postoperative diagnosis:                      Lung cancer     Procedure:   1. Left robot-assisted left upper lobe wedge resection, completion left upper lobectomy, mediastinal lymph node dissection     PATHOLOGY RESULTS: Final pathology showed poorly differentiated adeno, pT1bN0 stage IA2, R0 resection. No need for adjuvant therapy.      CULTURE RESULTS: None      INTRAOPERATIVE COMPLICATIONS: None      POSTOPERATIVE COMPLICATIONS: None      DRAINS/TUBES PRESENT AT DISCHARGE: None     DATE OF DISCHARGE:  June 18, 2021    INTERVAL STUDIES  CXR yesterday:   Small pleural effusion in base of left lung, expected post-surgery changes    SUBJECTIVE   Kailash states he is doing very well, feels his recovery went well and he is not noticing significant shortness of breath.   He had what sound like neuropathic pain/burning on anterior chest and back shortly after surgery but this has lessened.   He sleeps well, has a good appetite and normal bowel function.   He states his incisions are well healed with no erythema or drainage.   He is not taking any analgesics.    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  We reviewed his final pathology and he was told that no chemotherapy or radiation is indicated.   I discussed surveillance CT scans and will help arrange that.   [place] so [place]\"      PLAN  I spent 20 min on the date of the encounter in chart review, patient visit, review of tests, documentation and/or discussion with other providers about the issues documented above. I reviewed the plan as follows:  Call with any new concerns or questions  1. Necessary Tests & Appointments: Chest CT and follow-up in Sept, then every 6 months x 2 years  2. Pain Control Plan: N/A  3. Anticoagulation Plan: N/A (completed enoxaparin 3 weeks ago)  4. Smoking Cessation: N/A    All questions were answered and the patient and present family were in agreement with the plan.  I appreciate the opportunity to participate in the care of your patient and will keep you updated.  Sincerely,  SAY Snow, CNS      Again, thank you for allowing me to participate in the care of your patient.        Sincerely,        SAY Patel CNS    "

## 2021-07-28 NOTE — PROGRESS NOTES
"Kailash is a 66 year old who is being evaluated via a billable telephone visit.      What phone number would you like to be contacted at? [phone]  How would you like to obtain your AVS? DANIELE Sainz    Phone call duration: 20 minutes    THORACIC SURGERY FOLLOW UP VISIT    Dear Dr. Carolyne Cornejo,  I spoke by telephone to Mr. Murray in follow-up today. The clinical summary follows:     DATE OF ADMISSION: 6/16/2021      Preoperative diagnosis:                        Lung nodule  Postoperative diagnosis:                      Lung cancer     Procedure:   1. Left robot-assisted left upper lobe wedge resection, completion left upper lobectomy, mediastinal lymph node dissection     PATHOLOGY RESULTS: Final pathology showed poorly differentiated adeno, pT1bN0 stage IA2, R0 resection. No need for adjuvant therapy.      CULTURE RESULTS: None      INTRAOPERATIVE COMPLICATIONS: None      POSTOPERATIVE COMPLICATIONS: None      DRAINS/TUBES PRESENT AT DISCHARGE: None     DATE OF DISCHARGE:  June 18, 2021    INTERVAL STUDIES  CXR yesterday:   Small pleural effusion in base of left lung, expected post-surgery changes    SUBJECTIVE   Kailash states he is doing very well, feels his recovery went well and he is not noticing significant shortness of breath.   He had what sound like neuropathic pain/burning on anterior chest and back shortly after surgery but this has lessened.   He sleeps well, has a good appetite and normal bowel function.   He states his incisions are well healed with no erythema or drainage.   He is not taking any analgesics.    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  We reviewed his final pathology and he was told that no chemotherapy or radiation is indicated.   I " "discussed surveillance CT scans and will help arrange that.   [place] \"[place] are so [place]\"      PLAN  I spent 20 min on the date of the encounter in chart review, patient visit, review of tests, documentation and/or discussion with other providers about the issues documented above. I reviewed the plan as follows:  Call with any new concerns or questions  1. Necessary Tests & Appointments: Chest CT and follow-up in Sept, then every 6 months x 2 years  2. Pain Control Plan: N/A  3. Anticoagulation Plan: N/A (completed enoxaparin 3 weeks ago)  4. Smoking Cessation: N/A    All questions were answered and the patient and present family were in agreement with the plan.  I appreciate the opportunity to participate in the care of your patient and will keep you updated.  Sincerely,  SAY Snow, CNS  "

## 2022-01-01 ENCOUNTER — TELEPHONE (OUTPATIENT)
Dept: FAMILY MEDICINE | Facility: CLINIC | Age: 67
End: 2022-01-01
Payer: OTHER GOVERNMENT

## 2022-01-01 ENCOUNTER — MYC MEDICAL ADVICE (OUTPATIENT)
Dept: ONCOLOGY | Facility: CLINIC | Age: 67
End: 2022-01-01
Payer: OTHER GOVERNMENT

## 2022-01-01 ENCOUNTER — PATIENT OUTREACH (OUTPATIENT)
Dept: ONCOLOGY | Facility: CLINIC | Age: 67
End: 2022-01-01
Payer: OTHER GOVERNMENT

## 2022-01-01 ENCOUNTER — VIRTUAL VISIT (OUTPATIENT)
Dept: FAMILY MEDICINE | Facility: CLINIC | Age: 67
End: 2022-01-01
Payer: COMMERCIAL

## 2022-01-01 ENCOUNTER — OFFICE VISIT (OUTPATIENT)
Dept: FAMILY MEDICINE | Facility: CLINIC | Age: 67
End: 2022-01-01
Payer: COMMERCIAL

## 2022-01-01 ENCOUNTER — VIRTUAL VISIT (OUTPATIENT)
Dept: NEUROLOGY | Facility: CLINIC | Age: 67
End: 2022-01-01
Payer: COMMERCIAL

## 2022-01-01 ENCOUNTER — ANCILLARY PROCEDURE (OUTPATIENT)
Dept: GENERAL RADIOLOGY | Facility: CLINIC | Age: 67
End: 2022-01-01
Attending: FAMILY MEDICINE
Payer: COMMERCIAL

## 2022-01-01 ENCOUNTER — LAB (OUTPATIENT)
Dept: LAB | Facility: CLINIC | Age: 67
End: 2022-01-01
Payer: COMMERCIAL

## 2022-01-01 ENCOUNTER — TELEPHONE (OUTPATIENT)
Dept: FAMILY MEDICINE | Facility: CLINIC | Age: 67
End: 2022-01-01

## 2022-01-01 ENCOUNTER — ANCILLARY PROCEDURE (OUTPATIENT)
Dept: CT IMAGING | Facility: CLINIC | Age: 67
End: 2022-01-01
Attending: CLINICAL NURSE SPECIALIST
Payer: COMMERCIAL

## 2022-01-01 ENCOUNTER — MYC MEDICAL ADVICE (OUTPATIENT)
Dept: FAMILY MEDICINE | Facility: CLINIC | Age: 67
End: 2022-01-01
Payer: OTHER GOVERNMENT

## 2022-01-01 ENCOUNTER — ANCILLARY PROCEDURE (OUTPATIENT)
Dept: ULTRASOUND IMAGING | Facility: CLINIC | Age: 67
End: 2022-01-01
Attending: PSYCHIATRY & NEUROLOGY
Payer: COMMERCIAL

## 2022-01-01 ENCOUNTER — TELEPHONE (OUTPATIENT)
Dept: SURGERY | Facility: CLINIC | Age: 67
End: 2022-01-01
Payer: OTHER GOVERNMENT

## 2022-01-01 ENCOUNTER — ANCILLARY PROCEDURE (OUTPATIENT)
Dept: MRI IMAGING | Facility: CLINIC | Age: 67
End: 2022-01-01
Attending: FAMILY MEDICINE
Payer: COMMERCIAL

## 2022-01-01 ENCOUNTER — APPOINTMENT (OUTPATIENT)
Dept: LAB | Facility: CLINIC | Age: 67
End: 2022-01-01
Payer: OTHER GOVERNMENT

## 2022-01-01 ENCOUNTER — VIRTUAL VISIT (OUTPATIENT)
Dept: SURGERY | Facility: CLINIC | Age: 67
End: 2022-01-01
Attending: FAMILY MEDICINE
Payer: COMMERCIAL

## 2022-01-01 ENCOUNTER — TELEPHONE (OUTPATIENT)
Dept: PULMONOLOGY | Facility: CLINIC | Age: 67
End: 2022-01-01
Payer: OTHER GOVERNMENT

## 2022-01-01 ENCOUNTER — VIRTUAL VISIT (OUTPATIENT)
Dept: ONCOLOGY | Facility: CLINIC | Age: 67
End: 2022-01-01
Attending: FAMILY MEDICINE
Payer: COMMERCIAL

## 2022-01-01 ENCOUNTER — PRE VISIT (OUTPATIENT)
Dept: ONCOLOGY | Facility: CLINIC | Age: 67
End: 2022-01-01

## 2022-01-01 ENCOUNTER — OFFICE VISIT (OUTPATIENT)
Dept: OPTOMETRY | Facility: CLINIC | Age: 67
End: 2022-01-01
Payer: COMMERCIAL

## 2022-01-01 ENCOUNTER — ALLIED HEALTH/NURSE VISIT (OUTPATIENT)
Dept: CARDIOLOGY | Facility: CLINIC | Age: 67
End: 2022-01-01
Attending: PSYCHIATRY & NEUROLOGY
Payer: COMMERCIAL

## 2022-01-01 VITALS
BODY MASS INDEX: 24.53 KG/M2 | OXYGEN SATURATION: 97 % | WEIGHT: 165.6 LBS | HEART RATE: 66 BPM | TEMPERATURE: 97.6 F | HEIGHT: 69 IN | RESPIRATION RATE: 16 BRPM | DIASTOLIC BLOOD PRESSURE: 58 MMHG | SYSTOLIC BLOOD PRESSURE: 132 MMHG

## 2022-01-01 VITALS
TEMPERATURE: 97.3 F | HEART RATE: 89 BPM | DIASTOLIC BLOOD PRESSURE: 62 MMHG | WEIGHT: 153 LBS | BODY MASS INDEX: 22.59 KG/M2 | OXYGEN SATURATION: 94 % | SYSTOLIC BLOOD PRESSURE: 134 MMHG

## 2022-01-01 VITALS
TEMPERATURE: 98.6 F | HEART RATE: 59 BPM | DIASTOLIC BLOOD PRESSURE: 60 MMHG | BODY MASS INDEX: 24.22 KG/M2 | OXYGEN SATURATION: 96 % | SYSTOLIC BLOOD PRESSURE: 154 MMHG | WEIGHT: 164 LBS

## 2022-01-01 DIAGNOSIS — R41.0 CONFUSION: ICD-10-CM

## 2022-01-01 DIAGNOSIS — I82.403 DEEP VEIN THROMBOSIS (DVT) OF BOTH LOWER EXTREMITIES, UNSPECIFIED CHRONICITY, UNSPECIFIED VEIN (H): ICD-10-CM

## 2022-01-01 DIAGNOSIS — H43.812 PVD (POSTERIOR VITREOUS DETACHMENT), LEFT: ICD-10-CM

## 2022-01-01 DIAGNOSIS — D69.6 THROMBOCYTOPENIA (H): ICD-10-CM

## 2022-01-01 DIAGNOSIS — M79.662 BILATERAL CALF PAIN: ICD-10-CM

## 2022-01-01 DIAGNOSIS — D64.9 ANEMIA, UNSPECIFIED TYPE: ICD-10-CM

## 2022-01-01 DIAGNOSIS — F33.1 MAJOR DEPRESSIVE DISORDER, RECURRENT EPISODE, MODERATE (H): ICD-10-CM

## 2022-01-01 DIAGNOSIS — C34.92 NON-SMALL CELL LUNG CANCER, LEFT (H): Primary | ICD-10-CM

## 2022-01-01 DIAGNOSIS — E78.5 HYPERLIPIDEMIA LDL GOAL <70: Primary | ICD-10-CM

## 2022-01-01 DIAGNOSIS — I10 BENIGN ESSENTIAL HYPERTENSION: ICD-10-CM

## 2022-01-01 DIAGNOSIS — Z11.59 ENCOUNTER FOR SCREENING FOR VIRAL DISEASE: ICD-10-CM

## 2022-01-01 DIAGNOSIS — E78.5 HYPERLIPIDEMIA LDL GOAL <70: ICD-10-CM

## 2022-01-01 DIAGNOSIS — R93.89 ABNORMAL ULTRASOUND: Primary | ICD-10-CM

## 2022-01-01 DIAGNOSIS — I63.9 CEREBROVASCULAR ACCIDENT (CVA), UNSPECIFIED MECHANISM (H): ICD-10-CM

## 2022-01-01 DIAGNOSIS — R63.4 WEIGHT LOSS: ICD-10-CM

## 2022-01-01 DIAGNOSIS — I63.449 CEREBROVASCULAR ACCIDENT (CVA) DUE TO EMBOLISM OF CEREBELLAR ARTERY, UNSPECIFIED BLOOD VESSEL LATERALITY (H): ICD-10-CM

## 2022-01-01 DIAGNOSIS — N40.1 BENIGN PROSTATIC HYPERPLASIA WITH LOWER URINARY TRACT SYMPTOMS, SYMPTOM DETAILS UNSPECIFIED: ICD-10-CM

## 2022-01-01 DIAGNOSIS — Z86.73 HISTORY OF CVA (CEREBROVASCULAR ACCIDENT): Primary | ICD-10-CM

## 2022-01-01 DIAGNOSIS — F10.20 ETOHISM (H): ICD-10-CM

## 2022-01-01 DIAGNOSIS — I63.9 ACUTE CVA (CEREBROVASCULAR ACCIDENT) (H): Primary | ICD-10-CM

## 2022-01-01 DIAGNOSIS — J44.9 CHRONIC OBSTRUCTIVE PULMONARY DISEASE, UNSPECIFIED COPD TYPE (H): ICD-10-CM

## 2022-01-01 DIAGNOSIS — C34.92 NON-SMALL CELL LUNG CANCER, LEFT (H): ICD-10-CM

## 2022-01-01 DIAGNOSIS — R41.0 CONFUSION: Primary | ICD-10-CM

## 2022-01-01 DIAGNOSIS — I50.32 CHRONIC DIASTOLIC HEART FAILURE (H): ICD-10-CM

## 2022-01-01 DIAGNOSIS — F10.21 ALCOHOL DEPENDENCE IN REMISSION (H): ICD-10-CM

## 2022-01-01 DIAGNOSIS — R19.7 DIARRHEA OF PRESUMED INFECTIOUS ORIGIN: ICD-10-CM

## 2022-01-01 DIAGNOSIS — F17.200 SMOKER: ICD-10-CM

## 2022-01-01 DIAGNOSIS — Z96.1 PSEUDOPHAKIA: Primary | ICD-10-CM

## 2022-01-01 DIAGNOSIS — C34.90 MALIGNANT NEOPLASM OF LUNG, UNSPECIFIED LATERALITY, UNSPECIFIED PART OF LUNG (H): ICD-10-CM

## 2022-01-01 DIAGNOSIS — Z86.73 HISTORY OF CVA (CEREBROVASCULAR ACCIDENT): ICD-10-CM

## 2022-01-01 DIAGNOSIS — M79.661 BILATERAL CALF PAIN: ICD-10-CM

## 2022-01-01 DIAGNOSIS — T78.40XD ALLERGY, SUBSEQUENT ENCOUNTER: ICD-10-CM

## 2022-01-01 DIAGNOSIS — I73.9 CLAUDICATION (H): ICD-10-CM

## 2022-01-01 DIAGNOSIS — J20.9 ACUTE BRONCHITIS WITH SYMPTOMS > 10 DAYS: ICD-10-CM

## 2022-01-01 DIAGNOSIS — J43.9 PULMONARY EMPHYSEMA, UNSPECIFIED EMPHYSEMA TYPE (H): Primary | ICD-10-CM

## 2022-01-01 DIAGNOSIS — I25.10 CORONARY ARTERY CALCIFICATION: ICD-10-CM

## 2022-01-01 DIAGNOSIS — R06.2 WHEEZING: ICD-10-CM

## 2022-01-01 DIAGNOSIS — R05.9 COUGH: ICD-10-CM

## 2022-01-01 DIAGNOSIS — I63.9 CEREBROVASCULAR ACCIDENT (CVA), UNSPECIFIED MECHANISM (H): Primary | ICD-10-CM

## 2022-01-01 DIAGNOSIS — Z11.59 ENCOUNTER FOR SCREENING FOR VIRAL DISEASE: Primary | ICD-10-CM

## 2022-01-01 DIAGNOSIS — Z91.041 ALLERGIC TO IV CONTRAST: ICD-10-CM

## 2022-01-01 DIAGNOSIS — H52.223 REGULAR ASTIGMATISM OF BOTH EYES: ICD-10-CM

## 2022-01-01 DIAGNOSIS — R05.9 COUGH: Primary | ICD-10-CM

## 2022-01-01 DIAGNOSIS — J43.9 PULMONARY EMPHYSEMA, UNSPECIFIED EMPHYSEMA TYPE (H): ICD-10-CM

## 2022-01-01 DIAGNOSIS — F33.1 MAJOR DEPRESSIVE DISORDER, RECURRENT EPISODE, MODERATE (H): Primary | ICD-10-CM

## 2022-01-01 LAB
ALBUMIN SERPL-MCNC: 4.1 G/DL (ref 3.4–5)
ALBUMIN UR-MCNC: 30 MG/DL
ALP SERPL-CCNC: 114 U/L (ref 40–150)
ALT SERPL W P-5'-P-CCNC: 17 U/L (ref 0–70)
ANION GAP SERPL CALCULATED.3IONS-SCNC: 8 MMOL/L (ref 3–14)
APPEARANCE UR: CLEAR
APTT PPP: 36 SECONDS (ref 22–38)
AST SERPL W P-5'-P-CCNC: 22 U/L (ref 0–45)
BACTERIA #/AREA URNS HPF: ABNORMAL /HPF
BACTERIA SPT CULT: NORMAL
BASOPHILS # BLD AUTO: 0.1 10E3/UL (ref 0–0.2)
BASOPHILS # BLD AUTO: 0.1 10E3/UL (ref 0–0.2)
BASOPHILS # BLD AUTO: 0.2 10E3/UL (ref 0–0.2)
BASOPHILS NFR BLD AUTO: 1 %
BILIRUB DIRECT SERPL-MCNC: 0.1 MG/DL (ref 0–0.2)
BILIRUB SERPL-MCNC: 0.7 MG/DL (ref 0.2–1.3)
BILIRUB UR QL STRIP: NEGATIVE
BUN SERPL-MCNC: 20 MG/DL (ref 7–30)
C COLI+JEJUNI+LARI FUSA STL QL NAA+PROBE: NOT DETECTED
CALCIUM SERPL-MCNC: 9.4 MG/DL (ref 8.5–10.1)
CHLORIDE BLD-SCNC: 106 MMOL/L (ref 94–109)
CHOLEST SERPL-MCNC: 199 MG/DL
CK SERPL-CCNC: 496 U/L (ref 30–300)
CO2 SERPL-SCNC: 23 MMOL/L (ref 20–32)
COLOR UR AUTO: YELLOW
CREAT SERPL-MCNC: 1.03 MG/DL (ref 0.66–1.25)
EC STX1 GENE STL QL NAA+PROBE: NOT DETECTED
EC STX2 GENE STL QL NAA+PROBE: NOT DETECTED
EOSINOPHIL # BLD AUTO: 0.1 10E3/UL (ref 0–0.7)
EOSINOPHIL # BLD AUTO: 0.7 10E3/UL (ref 0–0.7)
EOSINOPHIL # BLD AUTO: 1 10E3/UL (ref 0–0.7)
EOSINOPHIL NFR BLD AUTO: 1 %
EOSINOPHIL NFR BLD AUTO: 6 %
EOSINOPHIL NFR BLD AUTO: 8 %
EPO SERPL-ACNC: 24 MU/ML
ERYTHROCYTE [DISTWIDTH] IN BLOOD BY AUTOMATED COUNT: 14.3 % (ref 10–15)
ERYTHROCYTE [DISTWIDTH] IN BLOOD BY AUTOMATED COUNT: 14.6 % (ref 10–15)
ERYTHROCYTE [DISTWIDTH] IN BLOOD BY AUTOMATED COUNT: 14.8 % (ref 10–15)
FASTING STATUS PATIENT QL REPORTED: ABNORMAL
FERRITIN SERPL-MCNC: 364 NG/ML (ref 26–388)
FERRITIN SERPL-MCNC: 441 NG/ML (ref 26–388)
FOLATE SERPL-MCNC: 13.3 NG/ML
GFR SERPL CREATININE-BSD FRML MDRD: 80 ML/MIN/1.73M2
GLUCOSE BLD-MCNC: 105 MG/DL (ref 70–99)
GLUCOSE UR STRIP-MCNC: NEGATIVE MG/DL
GRAM STAIN RESULT: NORMAL
GRAN CASTS #/AREA URNS LPF: ABNORMAL /LPF
HBV CORE AB SERPL QL IA: NONREACTIVE
HBV SURFACE AB SERPL IA-ACNC: 0.07 M[IU]/ML
HBV SURFACE AG SERPL QL IA: NONREACTIVE
HCT VFR BLD AUTO: 30 % (ref 40–53)
HCT VFR BLD AUTO: 33.4 % (ref 40–53)
HCT VFR BLD AUTO: 35.8 % (ref 40–53)
HCV AB SERPL QL IA: NONREACTIVE
HDLC SERPL-MCNC: 38 MG/DL
HGB BLD-MCNC: 10.7 G/DL (ref 13.3–17.7)
HGB BLD-MCNC: 11.8 G/DL (ref 13.3–17.7)
HGB BLD-MCNC: 9.8 G/DL (ref 13.3–17.7)
HGB UR QL STRIP: NEGATIVE
HIV 1+2 AB+HIV1 P24 AG SERPL QL IA: NONREACTIVE
HYALINE CASTS #/AREA URNS LPF: ABNORMAL /LPF
IMM GRANULOCYTES # BLD: 0.1 10E3/UL
IMM GRANULOCYTES NFR BLD: 1 %
INR PPP: 1.34 (ref 0.85–1.15)
IRON SATN MFR SERPL: 12 % (ref 15–46)
IRON SATN MFR SERPL: 13 % (ref 15–46)
IRON SERPL-MCNC: 28 UG/DL (ref 35–180)
IRON SERPL-MCNC: 37 UG/DL (ref 35–180)
KETONES UR STRIP-MCNC: NEGATIVE MG/DL
LDLC SERPL CALC-MCNC: 135 MG/DL
LEUKOCYTE ESTERASE UR QL STRIP: NEGATIVE
LYMPHOCYTES # BLD AUTO: 1.3 10E3/UL (ref 0.8–5.3)
LYMPHOCYTES # BLD AUTO: 1.6 10E3/UL (ref 0.8–5.3)
LYMPHOCYTES # BLD AUTO: 1.7 10E3/UL (ref 0.8–5.3)
LYMPHOCYTES NFR BLD AUTO: 11 %
LYMPHOCYTES NFR BLD AUTO: 13 %
LYMPHOCYTES NFR BLD AUTO: 13 %
MCH RBC QN AUTO: 27.3 PG (ref 26.5–33)
MCH RBC QN AUTO: 27.5 PG (ref 26.5–33)
MCH RBC QN AUTO: 28.4 PG (ref 26.5–33)
MCHC RBC AUTO-ENTMCNC: 32 G/DL (ref 31.5–36.5)
MCHC RBC AUTO-ENTMCNC: 32.7 G/DL (ref 31.5–36.5)
MCHC RBC AUTO-ENTMCNC: 33 G/DL (ref 31.5–36.5)
MCV RBC AUTO: 84 FL (ref 78–100)
MCV RBC AUTO: 86 FL (ref 78–100)
MCV RBC AUTO: 86 FL (ref 78–100)
MONOCYTES # BLD AUTO: 0.7 10E3/UL (ref 0–1.3)
MONOCYTES # BLD AUTO: 0.9 10E3/UL (ref 0–1.3)
MONOCYTES # BLD AUTO: 1.1 10E3/UL (ref 0–1.3)
MONOCYTES NFR BLD AUTO: 6 %
MONOCYTES NFR BLD AUTO: 7 %
MONOCYTES NFR BLD AUTO: 8 %
MUCOUS THREADS #/AREA URNS LPF: PRESENT /LPF
NEUTROPHILS # BLD AUTO: 8.5 10E3/UL (ref 1.6–8.3)
NEUTROPHILS # BLD AUTO: 9.5 10E3/UL (ref 1.6–8.3)
NEUTROPHILS # BLD AUTO: 9.9 10E3/UL (ref 1.6–8.3)
NEUTROPHILS NFR BLD AUTO: 70 %
NEUTROPHILS NFR BLD AUTO: 71 %
NEUTROPHILS NFR BLD AUTO: 80 %
NITRATE UR QL: NEGATIVE
NONHDLC SERPL-MCNC: 161 MG/DL
NOROV GI+II ORF1-ORF2 JNC STL QL NAA+PR: NOT DETECTED
NRBC # BLD AUTO: 0 10E3/UL
NRBC BLD AUTO-RTO: 0 /100
PATH REPORT.COMMENTS IMP SPEC: NORMAL
PATH REPORT.COMMENTS IMP SPEC: NORMAL
PATH REPORT.FINAL DX SPEC: NORMAL
PATH REPORT.MICROSCOPIC SPEC OTHER STN: NORMAL
PATH REPORT.MICROSCOPIC SPEC OTHER STN: NORMAL
PATH REPORT.RELEVANT HX SPEC: NORMAL
PH UR STRIP: 5.5 [PH] (ref 5–7)
PLATELET # BLD AUTO: 121 10E3/UL (ref 150–450)
PLATELET # BLD AUTO: 131 10E3/UL (ref 150–450)
PLATELET # BLD AUTO: 91 10E3/UL (ref 150–450)
POTASSIUM BLD-SCNC: 3.9 MMOL/L (ref 3.4–5.3)
PROT SERPL-MCNC: 7.9 G/DL (ref 6.8–8.8)
RADIOLOGIST FLAGS: NORMAL
RADIOLOGIST FLAGS: NORMAL
RBC # BLD AUTO: 3.59 10E6/UL (ref 4.4–5.9)
RBC # BLD AUTO: 3.89 10E6/UL (ref 4.4–5.9)
RBC # BLD AUTO: 4.16 10E6/UL (ref 4.4–5.9)
RBC #/AREA URNS AUTO: ABNORMAL /HPF
RETICS # AUTO: 0.05 10E6/UL (ref 0.03–0.1)
RETICS # AUTO: 0.07 10E6/UL (ref 0.03–0.1)
RETICS/RBC NFR AUTO: 1.4 % (ref 0.5–2)
RETICS/RBC NFR AUTO: 1.9 % (ref 0.5–2)
RVA NSP5 STL QL NAA+PROBE: NOT DETECTED
SALMONELLA SP RPOD STL QL NAA+PROBE: NOT DETECTED
SHIGELLA SP+EIEC IPAH STL QL NAA+PROBE: NOT DETECTED
SODIUM SERPL-SCNC: 137 MMOL/L (ref 133–144)
SP GR UR STRIP: >=1.03 (ref 1–1.03)
SQUAMOUS #/AREA URNS AUTO: ABNORMAL /LPF
TIBC SERPL-MCNC: 240 UG/DL (ref 240–430)
TIBC SERPL-MCNC: 285 UG/DL (ref 240–430)
TRIGL SERPL-MCNC: 130 MG/DL
TSH SERPL DL<=0.005 MIU/L-ACNC: 1.76 MU/L (ref 0.4–4)
UROBILINOGEN UR STRIP-ACNC: 0.2 E.U./DL
V CHOL+PARA RFBL+TRKH+TNAA STL QL NAA+PR: NOT DETECTED
VIT B12 SERPL-MCNC: 736 PG/ML (ref 193–986)
WBC # BLD AUTO: 12.2 10E3/UL (ref 4–11)
WBC # BLD AUTO: 12.3 10E3/UL (ref 4–11)
WBC # BLD AUTO: 13.3 10E3/UL (ref 4–11)
WBC #/AREA URNS AUTO: ABNORMAL /HPF
Y ENTERO RECN STL QL NAA+PROBE: NOT DETECTED

## 2022-01-01 PROCEDURE — 99214 OFFICE O/P EST MOD 30 MIN: CPT | Mod: 95 | Performed by: PHYSICIAN ASSISTANT

## 2022-01-01 PROCEDURE — 82746 ASSAY OF FOLIC ACID SERUM: CPT | Performed by: FAMILY MEDICINE

## 2022-01-01 PROCEDURE — 81001 URINALYSIS AUTO W/SCOPE: CPT

## 2022-01-01 PROCEDURE — 85025 COMPLETE CBC W/AUTO DIFF WBC: CPT

## 2022-01-01 PROCEDURE — G0008 ADMIN INFLUENZA VIRUS VAC: HCPCS | Performed by: FAMILY MEDICINE

## 2022-01-01 PROCEDURE — 71250 CT THORAX DX C-: CPT | Mod: GC | Performed by: RADIOLOGY

## 2022-01-01 PROCEDURE — 83550 IRON BINDING TEST: CPT

## 2022-01-01 PROCEDURE — 93970 EXTREMITY STUDY: CPT | Mod: GC | Performed by: RADIOLOGY

## 2022-01-01 PROCEDURE — 90662 IIV NO PRSV INCREASED AG IM: CPT | Performed by: FAMILY MEDICINE

## 2022-01-01 PROCEDURE — 36415 COLL VENOUS BLD VENIPUNCTURE: CPT

## 2022-01-01 PROCEDURE — 82248 BILIRUBIN DIRECT: CPT

## 2022-01-01 PROCEDURE — 85730 THROMBOPLASTIN TIME PARTIAL: CPT | Performed by: FAMILY MEDICINE

## 2022-01-01 PROCEDURE — 99214 OFFICE O/P EST MOD 30 MIN: CPT | Mod: 25 | Performed by: FAMILY MEDICINE

## 2022-01-01 PROCEDURE — 84443 ASSAY THYROID STIM HORMONE: CPT

## 2022-01-01 PROCEDURE — 99207 PR NO CHARGE LOS: CPT | Performed by: PATHOLOGY

## 2022-01-01 PROCEDURE — 87070 CULTURE OTHR SPECIMN AEROBIC: CPT

## 2022-01-01 PROCEDURE — 99204 OFFICE O/P NEW MOD 45 MIN: CPT | Mod: 95 | Performed by: INTERNAL MEDICINE

## 2022-01-01 PROCEDURE — 82668 ASSAY OF ERYTHROPOIETIN: CPT | Mod: 90

## 2022-01-01 PROCEDURE — 86706 HEP B SURFACE ANTIBODY: CPT

## 2022-01-01 PROCEDURE — 85025 COMPLETE CBC W/AUTO DIFF WBC: CPT | Performed by: FAMILY MEDICINE

## 2022-01-01 PROCEDURE — 99214 OFFICE O/P EST MOD 30 MIN: CPT | Performed by: FAMILY MEDICINE

## 2022-01-01 PROCEDURE — 85045 AUTOMATED RETICULOCYTE COUNT: CPT

## 2022-01-01 PROCEDURE — 99213 OFFICE O/P EST LOW 20 MIN: CPT | Mod: 95 | Performed by: CLINICAL NURSE SPECIALIST

## 2022-01-01 PROCEDURE — 71046 X-RAY EXAM CHEST 2 VIEWS: CPT | Performed by: RADIOLOGY

## 2022-01-01 PROCEDURE — 36415 COLL VENOUS BLD VENIPUNCTURE: CPT | Performed by: FAMILY MEDICINE

## 2022-01-01 PROCEDURE — 80053 COMPREHEN METABOLIC PANEL: CPT

## 2022-01-01 PROCEDURE — 70551 MRI BRAIN STEM W/O DYE: CPT | Mod: TC | Performed by: RADIOLOGY

## 2022-01-01 PROCEDURE — 82728 ASSAY OF FERRITIN: CPT

## 2022-01-01 PROCEDURE — 86704 HEP B CORE ANTIBODY TOTAL: CPT

## 2022-01-01 PROCEDURE — 87205 SMEAR GRAM STAIN: CPT

## 2022-01-01 PROCEDURE — 82550 ASSAY OF CK (CPK): CPT

## 2022-01-01 PROCEDURE — 85610 PROTHROMBIN TIME: CPT | Performed by: FAMILY MEDICINE

## 2022-01-01 PROCEDURE — 87506 IADNA-DNA/RNA PROBE TQ 6-11: CPT

## 2022-01-01 PROCEDURE — 80061 LIPID PANEL: CPT | Performed by: FAMILY MEDICINE

## 2022-01-01 PROCEDURE — 96127 BRIEF EMOTIONAL/BEHAV ASSMT: CPT | Performed by: FAMILY MEDICINE

## 2022-01-01 PROCEDURE — 90732 PPSV23 VACC 2 YRS+ SUBQ/IM: CPT | Performed by: FAMILY MEDICINE

## 2022-01-01 PROCEDURE — 87389 HIV-1 AG W/HIV-1&-2 AB AG IA: CPT

## 2022-01-01 PROCEDURE — 92004 COMPRE OPH EXAM NEW PT 1/>: CPT | Performed by: OPTOMETRIST

## 2022-01-01 PROCEDURE — 87340 HEPATITIS B SURFACE AG IA: CPT

## 2022-01-01 PROCEDURE — 92015 DETERMINE REFRACTIVE STATE: CPT | Performed by: OPTOMETRIST

## 2022-01-01 PROCEDURE — 82607 VITAMIN B-12: CPT | Performed by: FAMILY MEDICINE

## 2022-01-01 PROCEDURE — 86803 HEPATITIS C AB TEST: CPT

## 2022-01-01 PROCEDURE — 85045 AUTOMATED RETICULOCYTE COUNT: CPT | Performed by: FAMILY MEDICINE

## 2022-01-01 PROCEDURE — G0009 ADMIN PNEUMOCOCCAL VACCINE: HCPCS | Performed by: FAMILY MEDICINE

## 2022-01-01 PROCEDURE — 99205 OFFICE O/P NEW HI 60 MIN: CPT | Mod: 95 | Performed by: PSYCHIATRY & NEUROLOGY

## 2022-01-01 RX ORDER — ALBUTEROL SULFATE 90 UG/1
2 AEROSOL, METERED RESPIRATORY (INHALATION) EVERY 6 HOURS
Qty: 18 G | Refills: 1 | Status: SHIPPED | OUTPATIENT
Start: 2022-01-01

## 2022-01-01 RX ORDER — TAMSULOSIN HYDROCHLORIDE 0.4 MG/1
0.4 CAPSULE ORAL DAILY
Qty: 90 CAPSULE | Refills: 3 | Status: SHIPPED | OUTPATIENT
Start: 2022-01-01

## 2022-01-01 RX ORDER — ATORVASTATIN CALCIUM 40 MG/1
40 TABLET, FILM COATED ORAL DAILY
Qty: 90 TABLET | Refills: 3 | Status: SHIPPED | OUTPATIENT
Start: 2022-01-01 | End: 2022-01-01 | Stop reason: SINTOL

## 2022-01-01 RX ORDER — AZITHROMYCIN 250 MG/1
TABLET, FILM COATED ORAL
Qty: 6 TABLET | Refills: 0 | Status: SHIPPED | OUTPATIENT
Start: 2022-01-01 | End: 2022-01-01

## 2022-01-01 RX ORDER — ASPIRIN 325 MG
325 TABLET, DELAYED RELEASE (ENTERIC COATED) ORAL DAILY
COMMUNITY
Start: 2022-01-01 | End: 2022-01-01

## 2022-01-01 RX ORDER — METHYLPREDNISOLONE 32 MG/1
32 TABLET ORAL PRN
Qty: 2 TABLET | Refills: 1 | Status: SHIPPED | OUTPATIENT
Start: 2022-01-01

## 2022-01-01 RX ORDER — METHYLPREDNISOLONE 32 MG/1
32 TABLET ORAL DAILY
Qty: 2 TABLET | Refills: 1 | Status: SHIPPED | OUTPATIENT
Start: 2022-01-01

## 2022-01-01 RX ORDER — DIPHENHYDRAMINE HCL 50 MG
50 CAPSULE ORAL PRN
Qty: 1 CAPSULE | Refills: 0 | Status: SHIPPED | OUTPATIENT
Start: 2022-01-01

## 2022-01-01 RX ORDER — METHYLPREDNISOLONE 32 MG/1
32 TABLET ORAL PRN
Qty: 2 TABLET | Refills: 0 | Status: SHIPPED | OUTPATIENT
Start: 2022-01-01 | End: 2022-01-01

## 2022-01-01 RX ORDER — ASPIRIN 81 MG/1
81 TABLET ORAL DAILY
COMMUNITY

## 2022-01-01 RX ORDER — LOPERAMIDE HYDROCHLORIDE 2 MG/1
2 TABLET ORAL 3 TIMES DAILY PRN
Qty: 24 TABLET | Refills: 1 | Status: SHIPPED | OUTPATIENT
Start: 2022-01-01 | End: 2022-01-01

## 2022-01-01 ASSESSMENT — VISUAL ACUITY
OS_SC: 20/30
METHOD: SNELLEN - LINEAR
OS_SC: 20/60
OD_SC: 20/60
OD_SC: 20/30

## 2022-01-01 ASSESSMENT — PATIENT HEALTH QUESTIONNAIRE - PHQ9
SUM OF ALL RESPONSES TO PHQ QUESTIONS 1-9: 21
10. IF YOU CHECKED OFF ANY PROBLEMS, HOW DIFFICULT HAVE THESE PROBLEMS MADE IT FOR YOU TO DO YOUR WORK, TAKE CARE OF THINGS AT HOME, OR GET ALONG WITH OTHER PEOPLE: EXTREMELY DIFFICULT
5. POOR APPETITE OR OVEREATING: NOT AT ALL
SUM OF ALL RESPONSES TO PHQ QUESTIONS 1-9: 22
10. IF YOU CHECKED OFF ANY PROBLEMS, HOW DIFFICULT HAVE THESE PROBLEMS MADE IT FOR YOU TO DO YOUR WORK, TAKE CARE OF THINGS AT HOME, OR GET ALONG WITH OTHER PEOPLE: EXTREMELY DIFFICULT
SUM OF ALL RESPONSES TO PHQ QUESTIONS 1-9: 21
SUM OF ALL RESPONSES TO PHQ QUESTIONS 1-9: 22
10. IF YOU CHECKED OFF ANY PROBLEMS, HOW DIFFICULT HAVE THESE PROBLEMS MADE IT FOR YOU TO DO YOUR WORK, TAKE CARE OF THINGS AT HOME, OR GET ALONG WITH OTHER PEOPLE: EXTREMELY DIFFICULT
SUM OF ALL RESPONSES TO PHQ QUESTIONS 1-9: 15
SUM OF ALL RESPONSES TO PHQ QUESTIONS 1-9: 21

## 2022-01-01 ASSESSMENT — ANXIETY QUESTIONNAIRES
GAD7 TOTAL SCORE: 9
2. NOT BEING ABLE TO STOP OR CONTROL WORRYING: MORE THAN HALF THE DAYS
5. BEING SO RESTLESS THAT IT IS HARD TO SIT STILL: NOT AT ALL
6. BECOMING EASILY ANNOYED OR IRRITABLE: NEARLY EVERY DAY
3. WORRYING TOO MUCH ABOUT DIFFERENT THINGS: MORE THAN HALF THE DAYS
GAD7 TOTAL SCORE: 9
IF YOU CHECKED OFF ANY PROBLEMS ON THIS QUESTIONNAIRE, HOW DIFFICULT HAVE THESE PROBLEMS MADE IT FOR YOU TO DO YOUR WORK, TAKE CARE OF THINGS AT HOME, OR GET ALONG WITH OTHER PEOPLE: SOMEWHAT DIFFICULT
1. FEELING NERVOUS, ANXIOUS, OR ON EDGE: MORE THAN HALF THE DAYS
7. FEELING AFRAID AS IF SOMETHING AWFUL MIGHT HAPPEN: NOT AT ALL

## 2022-01-01 ASSESSMENT — PAIN SCALES - GENERAL
PAINLEVEL: NO PAIN (0)
PAINLEVEL: NO PAIN (0)

## 2022-01-01 ASSESSMENT — REFRACTION_MANIFEST
OS_ADD: +3.00
OS_CYLINDER: +1.00
OS_CYLINDER: +0.50
OS_SPHERE: -0.50
OS_SPHERE: -1.25
OD_CYLINDER: +0.50
OD_AXIS: 050
OD_SPHERE: -1.00
METHOD_AUTOREFRACTION: 1
OS_AXIS: 154
OD_ADD: +3.00
OS_AXIS: 155

## 2022-01-01 ASSESSMENT — KERATOMETRY
OD_K1POWER_DIOPTERS: 43.50
OD_AXISANGLE_DEGREES: 84
OS_AXISANGLE_DEGREES: 132
OD_AXISANGLE2_DEGREES: 174
OS_K2POWER_DIOPTERS: 44.25
OD_K2POWER_DIOPTERS: 44.00
OS_K1POWER_DIOPTERS: 44.00
OS_AXISANGLE2_DEGREES: 42

## 2022-01-01 ASSESSMENT — REFRACTION_WEARINGRX
SPECS_TYPE: OTC READERS
OS_SPHERE: +2.50
OD_SPHERE: +2.50

## 2022-01-01 ASSESSMENT — CONF VISUAL FIELD
OS_NORMAL: 1
OD_NORMAL: 1

## 2022-01-01 ASSESSMENT — TONOMETRY
IOP_METHOD: APPLANATION
OD_IOP_MMHG: 16
OS_IOP_MMHG: 16

## 2022-01-01 ASSESSMENT — SLIT LAMP EXAM - LIDS
COMMENTS: NORMAL
COMMENTS: NORMAL

## 2022-01-01 ASSESSMENT — CUP TO DISC RATIO
OD_RATIO: 0.3
OS_RATIO: 0.3

## 2022-01-01 ASSESSMENT — EXTERNAL EXAM - RIGHT EYE: OD_EXAM: NORMAL

## 2022-01-01 ASSESSMENT — EXTERNAL EXAM - LEFT EYE: OS_EXAM: NORMAL

## 2022-02-09 NOTE — PROGRESS NOTES
"Kailash is a 66 year old who is being evaluated via a billable video visit.      How would you like to obtain your AVS? MyChart   If the video visit is dropped, the invitation should be resent by: Text to cell phone: [phone] - if there are issues text to spouse Lola's cell phone: [phone]  Will anyone else be joining your video visit? Yes: spouse Lola. How would they like to receive their invitation? Text to cell phone: same as above    Video Start Time: 11:39 AM    Assessment & Plan     Confusion  - UA Macro with Reflex to Micro and Culture - lab collect; Future  - CBC with platelets and differential; Future  - Basic metabolic panel  (Ca, Cl, CO2, Creat, Gluc, K, Na, BUN); Future    Diarrhea of presumed infectious origin  - UA Macro with Reflex to Micro and Culture - lab collect; Future  - CBC with platelets and differential; Future  - Basic metabolic panel  (Ca, Cl, CO2, Creat, Gluc, K, Na, BUN); Future  - Enteric Bacteria and Virus Panel by [facility] Stool; Future  - loperamide (IMODIUM A-D) 2 MG tablet; Take 1 tablet (2 mg) by mouth 3 times daily as needed for diarrhea             BMI:   Estimated body mass index is 25.84 kg/m  as calculated from the following:    Height as of 21: 1.753 m (5' 9\").    Weight as of 21: 79.4 kg (175 lb).           Return today (on 2022) for for labs.    MARGI Turcios [facility]    Zana Linder is a 66 year old who presents for the following health issues  accompanied by his spouse Lola.    HPI     Pt here to discuss \"mental state\" - feels okay physically. See pt MyChart message below from spouse:      He had Covid around the first week in January,he wasn t tested but had been around his brothers while in [place] for their sister s  and they all tested positive at the same time he was sick. He has had all three shots and other than the low grade fever, body aches and being tired he faired well. But for the last " couple of weeks he s become increasingly confused, and not only frustrated but also frustrating to me. Two days ago he put ALL of his clothes on backwards and his coat inside out. He has been having trouble with the simplest of things like using the remote or his phone. He s also fixating on things like balancing the check book over and over. He is also easily irritated and snaps at me.        Patient with multiple chronic conditions and notable confusion.  Did review that there are many reasons for confusion but the most worrisome is infection.  He is amenable to this work up with referral to neurology if no positive results.      Review of Systems   Constitutional, HEENT, cardiovascular, pulmonary, gi and gu systems are negative, except as otherwise noted.      Objective           Vitals:  No vitals were obtained today due to virtual visit.    Physical Exam   GENERAL: Healthy, alert and no distress  EYES: Eyes grossly normal to inspection.  No discharge or erythema, or obvious scleral/conjunctival abnormalities.  RESP: No audible wheeze, cough, or visible cyanosis.  No visible retractions or increased work of breathing.    SKIN: Visible skin clear. No significant rash, abnormal pigmentation or lesions.  NEURO: Cranial nerves grossly intact.  Mentation and speech appropriate for age.  PSYCH: Mentation appears normal, affect normal/bright, judgement and insight intact, normal speech and appearance well-groomed.                Video-Visit Details    Type of service:  Video Visit    Video End Time:20 Minutes    Originating Location (pt. Location): Home    Distant Location (provider location):  [facility]     Platform used for Video Visit: KatharinaKoalah

## 2022-02-14 PROBLEM — R91.1 LUNG NODULE: Status: RESOLVED | Noted: 2021-03-31 | Resolved: 2022-01-01

## 2022-02-14 PROBLEM — D69.6 THROMBOCYTOPENIA (H): Status: ACTIVE | Noted: 2022-01-01

## 2022-02-14 PROBLEM — D64.9 ANEMIA: Status: ACTIVE | Noted: 2022-01-01

## 2022-02-14 PROBLEM — C34.90 LUNG CANCER (H): Status: ACTIVE | Noted: 2021-01-01

## 2022-02-15 NOTE — RESULT ENCOUNTER NOTE
Please schedule follow up in person with available clinician to discuss test results.  Marivel KISER

## 2022-02-16 NOTE — PROGRESS NOTES
Assessment & Plan       ICD-10-CM    1. Confusion  R41.0    2. Chronic obstructive pulmonary disease, unspecified COPD type (H)  J44.9    3. Smoker  F17.200    4. Malignant neoplasm of lung, unspecified laterality, unspecified part of lung (H)  C34.90    5. ETOHism (H)  F10.20    6. Major depressive disorder, recurrent episode, moderate (H)  F33.1      He has been having some confusion recently of unclear etiology  He does not appear to have any obvious underlying infection  I am concerned about possible brain metastasis given his history of lung cancer  I think obtaining the brain MRI tomorrow as scheduled is an appropriate next step for his evaluation  If that is unremarkable, then a neurology consult may be in order  It may also be prudent to start antidepressant medication, although the patient is somewhat reluctant to do that currently  I did strongly encourage him to quit smoking    His PCP ordered the brain MRI for him, so the results will go to her, and I will defer any further evaluation or treatment at this point to her based on the MRI results      Adan Daniels MD  [facility] [place] L[place] is a 66 year old who presents for the following health issues  accompanied by his spouse .          Patient has been feeling confused for 2 weeks with brain fog and haziness.He stated he is putting his clothes on backwards. He has always been good at math and lately is having trouble balancing his check book.         History of Present Illness     Reason for visit:  Having issues with normal functioning. Things like putting on clothes backwards or upside down, fixating on things like balancing checkbook over and over,  not being able to figure out simple things like working the remote or phone.sleeping a lot.  Symptom onset:  1-2 weeks ago  Symptoms include:  Confusion, foggy thinking, exhaustion, lack of bladder and sometimes bowel control  Symptom intensity:  Severe  Symptom  progression:  Staying the same  Had these symptoms before:  No    He eats 0-1 servings of fruits and vegetables daily.He consumes 3 sweetened beverage(s) daily.He exercises with enough effort to increase his heart rate 9 or less minutes per day.  He exercises with enough effort to increase his heart rate 3 or less days per week. He is missing 7 dose(s) of medications per week.  He is not taking prescribed medications regularly due to remembering to take.     He had a virtual visit a week ago and some labs were ordered.  The lab test came back fairly unremarkable.  His PCP is ordered a brain MRI for him and that is scheduled for tomorrow evening.  He has not been taking any routine medicines recently.  He does have a history of hypertension.  He has a history of lung cancer and COPD.  The lung cancer was treated surgically.  Unfortunately, he continues to smoke.  He has a history of alcohol abuse, but did quit drinking in 2020.  He was having some loose stools with bowel and bladder issues recently, but those issues have both improved.  He has not noticed any obvious new neurologic deficits such as weakness or trouble speaking.    It has been somewhat of a stressful year for him.  His father  last year and his sister  at the end of .  He retired last year.  He previously had worked odd hours and still has difficulty sleeping at night for very many hours in a row.  He has felt somewhat depressed recently.  We had him answer PHQ-9 questions and he scored very high on that.  See details in chart.    Patient Active Problem List   Diagnosis     Smoker     ETOHism (H)     Hyperlipidemia LDL goal <130     Chronic diastolic heart failure (H)     Health Care Home     Advanced directives, counseling/discussion     Trigger finger, acquired     Benign essential hypertension     COPD (chronic obstructive pulmonary disease) (H)     Cervical radiculopathy     Elevated fasting glucose     DDD (degenerative disc  "disease), lumbosacral     Spinal stenosis of lumbar region with neurogenic claudication     Major depressive disorder, recurrent episode, moderate (H)     Coronary artery calcification     Lung cancer (H)     Thrombocytopenia (H)     Anemia     Current Outpatient Medications   Medication     acetaminophen (TYLENOL) 500 MG tablet     aspirin 81 MG tablet     Glucosamine-Chondroitin (OSTEO BI-FLEX REGULAR STRENGTH PO)     ibuprofen (ADVIL/MOTRIN) 400 MG tablet     loperamide (IMODIUM A-D) 2 MG tablet     magnesium 250 MG tablet     tamsulosin (FLOMAX) 0.4 MG capsule     vitamin C (ASCORBIC ACID) 250 MG tablet     No current facility-administered medications for this visit.         Review of Systems   Mainly significant for the above.      Objective    /58   Pulse 66   Temp 97.6  F (36.4  C) (Oral)   Resp 16   Ht 1.753 m (5' 9\")   Wt 75.1 kg (165 lb 9.6 oz)   SpO2 97%   BMI 24.45 kg/m    Body mass index is 24.45 kg/m .  Physical Exam   GENERAL: alert and no distress  EYES: Eyes grossly normal to inspection, PERRL and conjunctivae and sclerae normal  HENT: Cranial nerves II through XII appear to be intact  NECK: Supple  ABDOMEN: Benign  MS: no gross musculoskeletal defects noted,  NEURO: He answers questions appropriately and carries on a conversation easily.  He knew the month and the year and the day of the month.  He knew the name of the president.  He does not have any obvious focal neurologic deficits.  PSYCH: affect seems fairly normal, but he did score a 22 on his PHQ-9    Lab on 02/10/2022   Component Date Value Ref Range Status     Color Urine 02/10/2022 Yellow  Colorless, Straw, Light Yellow, Yellow Final     Appearance Urine 02/10/2022 Clear  Clear Final     Glucose Urine 02/10/2022 Negative  Negative mg/dL Final     Bilirubin Urine 02/10/2022 Negative  Negative Final     Ketones Urine 02/10/2022 Negative  Negative mg/dL Final     Specific Gravity Urine 02/10/2022 >=1.030  1.003 - 1.035 Final     " Blood Urine 02/10/2022 Negative  Negative Final     pH Urine 02/10/2022 5.5  5.0 - 7.0 Final     Protein Albumin Urine 02/10/2022 30  (A) Negative mg/dL Final     Urobilinogen Urine 02/10/2022 0.2  0.2, 1.0 E.U./dL Final     Nitrite Urine 02/10/2022 Negative  Negative Final     Leukocyte Esterase Urine 02/10/2022 Negative  Negative Final     Sodium 02/10/2022 137  133 - 144 mmol/L Final     Potassium 02/10/2022 3.9  3.4 - 5.3 mmol/L Final     Chloride 02/10/2022 106  94 - 109 mmol/L Final     Carbon Dioxide (CO2) 02/10/2022 23  20 - 32 mmol/L Final     Anion Gap 02/10/2022 8  3 - 14 mmol/L Final     Urea Nitrogen 02/10/2022 20  7 - 30 mg/dL Final     Creatinine 02/10/2022 1.03  0.66 - 1.25 mg/dL Final     Calcium 02/10/2022 9.4  8.5 - 10.1 mg/dL Final     Glucose 02/10/2022 105 (A) 70 - 99 mg/dL Final     GFR Estimate 02/10/2022 80  >60 mL/min/1.73m2 Final    Effective December 21, 2021 eGFRcr in adults is calculated using the 2021 CKD-EPI creatinine equation which includes age and gender (Nehemias et al., [facility], DOI: 10.1056/CCSZsm5930850)     WBC Count 02/10/2022 13.3 (A) 4.0 - 11.0 10e3/uL Final     RBC Count 02/10/2022 4.16 (A) 4.40 - 5.90 10e6/uL Final     Hemoglobin 02/10/2022 11.8 (A) 13.3 - 17.7 g/dL Final     Hematocrit 02/10/2022 35.8 (A) 40.0 - 53.0 % Final     MCV 02/10/2022 86  78 - 100 fL Final     MCH 02/10/2022 28.4  26.5 - 33.0 pg Final     MCHC 02/10/2022 33.0  31.5 - 36.5 g/dL Final     RDW 02/10/2022 14.8  10.0 - 15.0 % Final     Platelet Count 02/10/2022 91 (A) 150 - 450 10e3/uL Final     % Neutrophils 02/10/2022 71  % Final     % Lymphocytes 02/10/2022 13  % Final     % Monocytes 02/10/2022 8  % Final     % Eosinophils 02/10/2022 6  % Final     % Basophils 02/10/2022 1  % Final     % Immature Granulocytes 02/10/2022 1  % Final     NRBCs per 100 WBC 02/10/2022 0  <1 /100 Final     Absolute Neutrophils 02/10/2022 9.5 (A) 1.6 - 8.3 10e3/uL Final     Absolute Lymphocytes 02/10/2022 1.7  0.8 - 5.3  10e3/uL Final     Absolute Monocytes 02/10/2022 1.1  0.0 - 1.3 10e3/uL Final     Absolute Eosinophils 02/10/2022 0.7  0.0 - 0.7 10e3/uL Final     Absolute Basophils 02/10/2022 0.1  0.0 - 0.2 10e3/uL Final     Absolute Immature Granulocytes 02/10/2022 0.1  <=0.4 10e3/uL Final     Absolute NRBCs 02/10/2022 0.0  10e3/uL Final     Campylobacter group 02/12/2022 Not Detected  Not Detected Final     Salmonella species 02/12/2022 Not Detected  Not Detected Final     Shigella species 02/12/2022 Not Detected  Not Detected Final     Vibrio group 02/12/2022 Not Detected  Not Detected Final     Rotavirus 02/12/2022 Not Detected  Not Detected Final     Shiga toxin 1 gene 02/12/2022 Not Detected  Not Detected Final     Shiga toxin 2 gene 02/12/2022 Not Detected  Not Detected Final     Norovirus I and II 02/12/2022 Not Detected  Not Detected Final     Yersinia enterocolitica 02/12/2022 Not Detected  Not Detected Final     Bacteria Urine 02/10/2022 Many (A) None Seen /HPF Final     RBC Urine 02/10/2022 0-2  0-2 /HPF /HPF Final     WBC Urine 02/10/2022 0-5  0-5 /HPF /HPF Final     Squamous Epithelials Urine 02/10/2022 Few (A) None Seen /LPF Final     Mucus Urine 02/10/2022 Present (A) None Seen /LPF Final     Hyaline Casts Urine 02/10/2022 0-2 (A) None Seen /LPF Final     Granular Casts Urine 02/10/2022 0-2 (A) None Seen /LPF Final     Ferritin 02/10/2022 364  26 - 388 ng/mL Final     TSH 02/10/2022 1.76  0.40 - 4.00 mU/L Final     Iron 02/10/2022 37  35 - 180 ug/dL Final     Iron Binding Capacity 02/10/2022 285  240 - 430 ug/dL Final     Iron Sat Index 02/10/2022 13 (A) 15 - 46 % Final     Bilirubin Total 02/10/2022 0.7  0.2 - 1.3 mg/dL Final     Bilirubin Direct 02/10/2022 0.1  0.0 - 0.2 mg/dL Final     Protein Total 02/10/2022 7.9  6.8 - 8.8 g/dL Final     Albumin 02/10/2022 4.1  3.4 - 5.0 g/dL Final     Alkaline Phosphatase 02/10/2022 114  40 - 150 U/L Final     AST 02/10/2022 22  0 - 45 U/L Final     ALT 02/10/2022 17  0 - 70  U/L Final             Answers for HPI/ROS submitted by the patient on 2/16/2022  If you checked off any problems, how difficult have these problems made it for you to do your work, take care of things at home, or get along with other people?: Extremely difficult  PHQ9 TOTAL SCORE: 22

## 2022-02-18 NOTE — TELEPHONE ENCOUNTER
"I was handed a critical brain MRI report on this gentleman. He's a primary care patient with Carolyne Curiel MD. This was via the Triage RN. I called and reviewed this case with Dr. Tuttle the radiologist  . I am told that patient has at the very least 2 different areas of abnormal brain tissue consistent with acute and subacute ischemia, that is to say, cerebrovascular accidents.      The area on the left side is more subacute and may have occurred somewhere between 2-4 weeks ago. The right sided area is more acute and likely is 4-7 days old. It's left cerebellum and right sided parietal lobe . There's some underlying brain atrophy but no evidence of metastatic deposit , this was a worry due to patients lung cancer history. Also no evidence of infectious disease such as a brain abscess.     The radiologists comments that it's not entirely clear to him if this is possibly embolic stroke perhaps from a coronary valve vegetation. Patient is certainly a high risk vascular disease patient with active smoking, and elevated blood glucose readings , hypercholesterolemia, and history of coronary calcifications and hypertension.    I called and spoke with Lola, patients spouse at some length . We debated if an acute hospitalization was warranted. I am told that patient has an entirely stable mental status changes. It's \" just a little bit \" off. He has episodic statements that suggest confusion. But no focal neurological deficits and his symptoms haven't changed in over a week.    We agreed that an urgent neurological consultation is needed and that acute hospitalization is optional. I agreed to seek an urgent outpatient neurological consultation and in the meantime recommended increasing his aspirin from 81 milligrams to 325 milligrams.    See orders section of this encounter     Mayur Gomez MD    "

## 2022-02-18 NOTE — TELEPHONE ENCOUNTER
Radiologist assistant calling results for brain MRI 2/17/22.     Please call radiologist assistant Results need to be relayed to MD, YOGI, GITA Walsh [phone]

## 2022-02-19 PROBLEM — I63.9 CEREBROVASCULAR ACCIDENT (H): Status: ACTIVE | Noted: 2022-01-01

## 2022-02-19 NOTE — RESULT ENCOUNTER NOTE
Please call patient and schedule neurology consult per other telephone encounter:  Your MRI shows a stroke. Increase aspirin to 325 mg daily and follow-up with me to discuss a cholesterol lowering medication. Call the imaging center at [phone] or  [phone] to schedule an echocardiogram heart test, and see the neurologist as referred.     Carolyne Cornejo MD

## 2022-02-21 NOTE — TELEPHONE ENCOUNTER
Spoke with Neurology, relayed RN message. Neurology will reach back out to patient to reschedule for a sooner appointment.     [id number], MEDICAL ASSISTANT

## 2022-02-21 NOTE — TELEPHONE ENCOUNTER
Provider has already spoken with spouse regarding results.    Will route to team to please help patient get in with neuro asap due to findings on MRI showing 2 different areas consistent with recent strokes   Patient has appointment with neuro on 5/10/2022- can they get him in sooner somewhere?    Fabien Solitario RN  [facility]

## 2022-02-22 NOTE — TELEPHONE ENCOUNTER
RECORDS RECEIVED FROM: Internal   REASON FOR VISIT: CVA   Date of Appt: 5/10/22   NOTES (FOR ALL VISITS) STATUS DETAILS   OFFICE NOTE from referring provider Internal Dr Mayur Gomez @ [facility] PCP:  2/18/22 telephone encounter   OFFICE NOTE from other specialist Internal Dr Adan Daniels @ [facility] PCP:  2/16/22   MEDICATION LIST Internal    IMAGING  (FOR ALL VISITS)     MRI (HEAD, NECK, SPINE) Internal [facility]:  MRI Brain 2/17/22   CT (HEAD, NECK, SPINE) Received [facility]:  CT Head 9/8/20  CT Head 8/31/20  CT Head 8/11/20  CT Head 8/4/20  CT Head 7/31/20      Action 2/22/22 MV 1.08pm   Action Taken Imaging request faxed to [facility]    --3/9/22 MV 12.52pm--  2nd request faxed to [facility]    --3/10/22 MV 2.27pm--  [facility] images resolved in PACS

## 2022-03-09 PROBLEM — M48.062 SPINAL STENOSIS OF LUMBAR REGION WITH NEUROGENIC CLAUDICATION: Status: ACTIVE | Noted: 2018-08-27

## 2022-03-09 PROBLEM — Z86.73 HISTORY OF CVA (CEREBROVASCULAR ACCIDENT): Status: ACTIVE | Noted: 2022-01-01

## 2022-03-09 PROBLEM — N40.1 BENIGN PROSTATIC HYPERPLASIA WITH LOWER URINARY TRACT SYMPTOMS, SYMPTOM DETAILS UNSPECIFIED: Status: ACTIVE | Noted: 2022-01-01

## 2022-03-09 PROBLEM — I63.9 CEREBROVASCULAR ACCIDENT (H): Status: RESOLVED | Noted: 2022-01-01 | Resolved: 2022-01-01

## 2022-03-09 NOTE — PATIENT INSTRUCTIONS
Call the imaging center at [phone] or  [phone] to schedule your echocardiogram.    Get the shingles vaccine, called Shingrix (given as 2 shots, 2 to 6 months apart), even if you have already had the Zostavax vaccine. Discuss getting the Shingix vaccine from your pharmacist, or schedule an ancillary shot visit here. Some insurances do not cover the cost of these vaccines.      Did you know you can lower your blood pressure with your daily habits?    *Eating a low-fat diet rich in fruits, vegetables and low-fat dairy lowers blood pressure 8 to 14 points.  *Eating a low-salt diet lowers blood pressure 2 to 8 points.  *Exercising regularly lowers blood pressure 4 to 9 points.  *Reducing alcohol use lowers blood pressure 2 to 4 points.

## 2022-03-09 NOTE — LETTER
My Depression Action Plan  Name: Kailash Murray   Date of Birth 1955  Date: 3/9/2022    My doctor: Carolyne Cornejo   My clinic: [facility]  [address]  [place] [address]  [phone]          GREEN    ZONE   Good Control    What it looks like:     Things are going generally well. You have normal ups and downs. You may even feel depressed from time to time, but bad moods usually last less than a day.   What you need to do:  1. Continue to care for yourself (see self care plan)  2. Check your depression survival kit and update it as needed  3. Follow your physician s recommendations including any medication.  4. Do not stop taking medication unless you consult with your physician first.           YELLOW         ZONE Getting Worse    What it looks like:     Depression is starting to interfere with your life.     It may be hard to get out of bed; you may be starting to isolate yourself from others.    Symptoms of depression are starting to last most all day and this has happened for several days.     You may have suicidal thoughts but they are not constant.   What you need to do:     1. Call your care team. Your response to treatment will improve if you keep your care team informed of your progress. Yellow periods are signs an adjustment may need to be made.     2. Continue your self-care.  Just get dressed and ready for the day.  Don't give yourself time to talk yourself out of it.    3. Talk to someone in your support network.    4. Open up your Depression Self-Care Plan/Wellness Kit.           RED    ZONE Medical Alert - Get Help    What it looks like:     Depression is seriously interfering with your life.     You may experience these or other symptoms: You can t get out of bed most days, can t work or engage in other necessary activities, you have trouble taking care of basic hygiene, or basic responsibilities, thoughts of suicide or death that will  not go away, self-injurious behavior.     What you need to do:  1. Call your care team and request a same-day appointment. If they are not available (weekends or after hours) call your local crisis line, emergency room or 911.          Depression Self-Care Plan / Wellness Kit    Many people find that medication and therapy are helpful treatments for managing depression. In addition, making small changes to your everyday life can help to boost your mood and improve your wellbeing. Below are some tips for you to consider. Be sure to talk with your medical provider and/or behavioral health consultant if your symptoms are worsening or not improving.     Sleep   Sleep hygiene  means all of the habits that support good, restful sleep. It includes maintaining a consistent bedtime and wake time, using your bedroom only for sleeping or sex, and keeping the bedroom dark and free of distractions like a computer, smartphone, or television.     Develop a Healthy Routine  Maintain good hygiene. Get out of bed in the morning, make your bed, brush your teeth, take a shower, and get dressed. Don t spend too much time viewing media that makes you feel stressed. Find time to relax each day.    Exercise  Get some form of exercise every day. This will help reduce pain and release endorphins, the  feel good  chemicals in your brain. It can be as simple as just going for a walk or doing some gardening, anything that will get you moving.      Diet  Strive to eat healthy foods, including fruits and vegetables. Drink plenty of water. Avoid excessive sugar, caffeine, alcohol, and other mood-altering substances.     Stay Connected with Others  Stay in touch with friends and family members.    Manage Your Mood  Try deep breathing, massage therapy, biofeedback, or meditation. Take part in fun activities when you can. Try to find something to smile about each day.     Psychotherapy  Be open to working with a therapist if your provider recommends  it.     Medication  Be sure to take your medication as prescribed. Most anti-depressants need to be taken every day. It usually takes several weeks for medications to work. Not all medicines work for all people. It is important to follow-up with your provider to make sure you have a treatment plan that is working for you. Do not stop your medication abruptly without first discussing it with your provider.    Crisis Resources   These hotlines are for both adults and children. They and are open 24 hours a day, 7 days a week unless noted otherwise.      National Suicide Prevention Lifeline   5-388-751-TALK (5201)      Crisis Text Line    www.crisistextline.org  Text HOME to 711278 from anywhere in the [place], anytime, about any type of crisis. A live, trained crisis counselor will receive the text and respond quickly.      [place] Lifeline for LGBTQ Youth  A national crisis intervention and suicide lifeline for LGBTQ youth under 25. Provides a safe place to talk without judgement. Call 1-423.111.9713; text START to 817666 or visit www.thetrevorproject.org to talk to a trained counselor.      For [facility] crisis numbers, visit the [facility] website at:  https://[place].gov/dhs/people-we-serve/adults/health-care/mental-health/resources/crisis-contacts.jsp

## 2022-03-10 NOTE — PROGRESS NOTES
received referral for Kailash for anemia, thrombocytopenia. Patient has history of lung cancer, wedge resection in June of 2021. Upon chart review, Hgb currently 10.7 as of 3/9/22, platelets 121 same date, eosinophils elevated at 1.0 same date, previously 0.7 on 2/10/22, no previous differential on file. Will message thoracic care team for review if follow up needed based on rising eosinophils. Will also update scheduling instructions for next available with location of choice for benign heme. Will finalize referral once response received from thoracic team.

## 2022-03-10 NOTE — RESULT ENCOUNTER NOTE
Please call patient:  Start the cholesterol medication again as we discussed. You continue to have anemia and low platelets, and I'd like you to meet with a hematologist at your convenience. I have made a referral.   Carolyne Cornejo MD

## 2022-03-10 NOTE — PROGRESS NOTES
Writer received referral for pancytopenia. Reviewed for urgency based on labs and symptomology. Appropriate scheduling instructions added and referral sent to New Patient Scheduling for completion.

## 2022-03-11 NOTE — PROGRESS NOTES
RECORDS STATUS - ALL OTHER DIAGNOSIS      RECORDS RECEIVED FROM: [facility]   DATE RECEIVED: 4/5/2022   NOTES STATUS DETAILS   OFFICE NOTE from referring provider [facility]   Referral from Carolyne Cornejo MD, records in [facility]   DISCHARGE SUMMARY from hospital     DISCHARGE REPORT from the ER     OPERATIVE REPORT     MEDICATION LIST Complete [facility]   CLINICAL TRIAL TREATMENTS TO DATE     LABS     ANYTHING RELATED TO DIAGNOSIS Complete Labs last updated on 3/9/2022   GENONOMIC TESTING     TYPE:     IMAGING (NEED IMAGES & REPORT)     CT SCANS     MRI     MAMMO     ULTRASOUND     PET       Action    Action Taken 3/11/2022 8:39AM FELIX     I called pt Kailash - all records are internal.

## 2022-03-17 NOTE — LETTER
3/17/2022         RE: Kailash Murray  [address]  [place] [address]        Dear Colleague,    Thank you for referring your patient, Kailash Murray, to the [facility]. Please see a copy of my visit note below.    Kailash is a 66 year old who is being evaluated via a billable video visit.      How would you like to obtain your AVS? MyChart  If the video visit is dropped, the invitation should be resent by: Send to e-mail at: [email or web address]  Will anyone else be joining your video visit? No    Yeimi Colby       THORACIC SURGERY FOLLOW UP VISIT      I saw Mr. Murray in follow-up today. The clinical summary follows:     PREOP DIAGNOSIS   Lung nodule    PROCEDURE   Robot assisted left thoracoscopic left upper lobe wedge resection, completion left upper lobectomy    DATE OF PROCEDURE  06/16/2021    HISTOPATHOLOGY   Invasive poorly differentiated adenocarcinoma T1bN0    COMPLICATIONS  None    INTERVAL STUDIES  CT chest: bronchial wall thickening and endobronchial secretions, enlarged ipsilateral prevascular and paratracheal/pretracheal LNs and an enlarged left supraclavicular LN.     ETOH no  TOB current smoker, 1/2 PPD, 25 pack years    SUBJECTIVE   Kailash is doing ok. He does get short of breath on occasion. He does have a productive cough-clear secretions. He gets tired fairly easily. His appetite has gone down since he had Covid in January of this year. He did not receive any treatment for Covid. He also had 2 strokes in February of this year when he got back from a trip to [place].     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    IMPRESSION   66 year-old man with  Left upper lobe lung cancer status post left upper lobectomy.    His CT findings could certainly be related to his recent Covid infection. I will present him at Nodule Conference tomorrow morning to get  the group consensus on whether to get a short term follow up chest CT vs bronchoscopic biopsy and washings vs biopsy of the left supraclavicular lymph node.    PLAN  I spent 25 min on the date of the encounter in chart review, patient visit, review of tests, documentation and/or discussion with other providers about the issues documented above. I reviewed the plan as follows:  Nodule Conference  1. Necessary Tests & Appointments: TBD  2. Pain Control Plan: NA  3. Anticoagulation Plan: NA  4. Smoking Cessation: Not interested in cessation at this time.    All questions were answered and the patient and present family were in agreement with the plan.  I appreciate the opportunity to participate in the care of your patient and will keep you updated.        Again, thank you for allowing me to participate in the care of your patient.      Sincerely,    SAY Valladares CNS

## 2022-03-17 NOTE — PROGRESS NOTES
Kailash is a 66 year old who is being evaluated via a billable video visit.      How would you like to obtain your AVS? MyChart  If the video visit is dropped, the invitation should be resent by: Send to e-mail at: [email or web address]  Will anyone else be joining your video visit? No    Yeimi Colby       THORACIC SURGERY FOLLOW UP VISIT      I saw Mr. Murray in follow-up today. The clinical summary follows:     PREOP DIAGNOSIS   Lung nodule    PROCEDURE   Robot assisted left thoracoscopic left upper lobe wedge resection, completion left upper lobectomy    DATE OF PROCEDURE  06/16/2021    HISTOPATHOLOGY   Invasive poorly differentiated adenocarcinoma T1bN0    COMPLICATIONS  None    INTERVAL STUDIES  CT chest: bronchial wall thickening and endobronchial secretions, enlarged ipsilateral prevascular and paratracheal/pretracheal LNs and an enlarged left supraclavicular LN.     ETOH no  TOB current smoker, 1/2 PPD, 25 pack years    SUBJECTIVE   Kailash is doing ok. He does get short of breath on occasion. He does have a productive cough-clear secretions. He gets tired fairly easily. His appetite has gone down since he had Covid in January of this year. He did not receive any treatment for Covid. He also had 2 strokes in February of this year when he got back from a trip to [place].     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    IMPRESSION   66 year-old man with  Left upper lobe lung cancer status post left upper lobectomy.    His CT findings could certainly be related to his recent Covid infection. I will present him at Nodule Conference tomorrow morning to get the group consensus on whether to get a short term follow up chest CT vs bronchoscopic biopsy and washings vs biopsy of the left supraclavicular lymph node.    PLAN  I spent 25 min on the date of the encounter in chart review, patient visit, review of tests, documentation  and/or discussion with other providers about the issues documented above. I reviewed the plan as follows:  Nodule Conference  1. Necessary Tests & Appointments: TBD  2. Pain Control Plan: NA  3. Anticoagulation Plan: NA  4. Smoking Cessation: Not interested in cessation at this time.    All questions were answered and the patient and present family were in agreement with the plan.  I appreciate the opportunity to participate in the care of your patient and will keep you updated.  Sincerely,      Video-Visit Details    Type of service:  Video Visit    Start Time: 0928    Video End Time:0940    Originating Location (pt. Location): Home    Distant Location (provider location):  [facility]     Platform used for Video Visit: "[employer]

## 2022-03-18 NOTE — TELEPHONE ENCOUNTER
Call to Kailash to discuss the recommendations from conference this morning. He agrees with the plan and will await a call to schedule a lab appointment for a sputum culture (soon)  and for a chest CT (3 months from now).

## 2022-03-29 NOTE — TELEPHONE ENCOUNTER
Discussed case with neurologist. She advises SHIELA with bubble instead of TTE. Please reschedule echocardiogram as SHIELA.     Carolyne Cornejo MD

## 2022-03-29 NOTE — TELEPHONE ENCOUNTER
Received call from Ena with [facility]. She is reaching out to get in touch with Dr. Cornejo, as Dr. Dean who saw pt in stroke clinic today has a few things that she would like to discuss. She did not give any further information to writer, just states that it is to discuss workup. She would like a call today if possible.    Personal phone number: [phone]    SIVAN Jones RN  [facility], [place]

## 2022-03-29 NOTE — PROGRESS NOTES
Kailash is a 67 year old who is being evaluated via a billable video visit.      How would you like to obtain your AVS? Mychart  If the video visit is dropped, the invitation should be resent by: [phone]      Video Start Time: 12 noon  Video-Visit Details    Type of service:  Video Visit    Video End Time:12:45    Originating Location (pt. Location): Home    Distant Location (provider location):  [facility] NEUROLOGY [facility]     Platform used for Video Visit: [employer]

## 2022-03-29 NOTE — PROGRESS NOTES
[facility] NEUROLOG[facility] CLINIC [address]  3RD FLOOR  [facility] [address] [phone]          March 29, 2022    Kailash Murray                                                                                                                     [address]   [place] [address]      Video visit: 12-12:45 PMDocum  documentation, discussion with PCP, care coordination: 30 minutes  TOTAL 75 minutes    Mr. Murray is a 67 year old gentleman with a complex PMH as listed below and vascular risk factors including HTN, dyslipidemia and a h/o smoking. He also has a h/o adeno ca of the lung in 6/21 which has been resented. This gentleman went to [place] in January of this year and got COVID. His wife then found subsequently that he was confused. For example, he was not getting getting dressed correctly - putting on his clothes backwards. He was loading the laundry incorrectly and making other errors. He was evaluated with a brain MRI on 2/17 and was found to have two recent infarcts - one in the posterior R MCA and a small stroke in the L cerebellar hemisphere. His gait was impaired and his wife has not let him drive. He has also been having a lot of bilateral calf pain and this may have been coincident with re-starting his statin. He had a bad HA 5-7 days ago and also felt that he had a shadow in his L visual field - the exact start date of this shadow is unknown though it was after his stroke according to the patient. His PCP is Dr. Cornejo and oncologist is Dr. Gomez. His wife feels that his cognitive issues and gait are improving slowly. He has not had any therapies.     The patient has a significant h/o tobacco smoking (3 PPD) and is now down to 1-2 cigs daily. H/O alcohol use - now stopped for more than 2 years. 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 He is on 325 mg of aspirin and a statin - I do not see any BP medications.      Allergic to contrast dye.      ASSESSMENT / PLAN  Encounter Diagnoses   Name Primary?     Cerebrovascular accident (CVA), unspecified mechanism (H) Yes     Bilateral calf pain      Deep vein thrombosis (DVT) of both lower extremities, unspecified chronicity, unspecified vein (H)      Allergy, subsequent encounter      This 67 year ols gentleman has had two recent strokes in the context of COVID. The stroke etiology may be due to a COVID related pro-thrombotic state. He also has a h/o adenoca of the lung. I have ordered a first line stroke work up. I will repeat his brain MRI given his new symptoms and also have ordered a vascular work up. His PCP has ordered a TTE and after discussion this will be changed to a SHIELA by them. I have ordered a LE doppler. He does have cognitive difficulties, gait difficulties and difficult with his  L UE. Therapies have been ordered. He should not drive unless cleared by OT/Rehab medicine. I have requested RTC in 4 weeks. Goal BP < 140/90 mm Hg.     Orders Placed This Encounter   Procedures     US Lower Extremity Venous Duplex Bilateral     MR Brain w/o & w Contrast     MRA Neck (Carotids) wo & w Contrast     MRA Brain ([place]) wo & w Contrast     CK total     Physical Therapy Referral     Speech Therapy Referral     Occupational Therapy Referral     Leadless EKG Monitor 8 to 14 Days     I have ordered premedication for MRI contrast.       MRI  2/17/22                                                                      IMPRESSION:  1.  Small acute or subacute infarct is seen within the left cerebellar hemisphere.  Patchy areas of ischemia are seen within the posterior right MCA distribution (right parietal and occipital lobes) favored to be subacute. Involvement of more than one   vascular injury suggests a central/embolic etiology.     2.  Moderate volume loss and mild presumed sequela chronic microvascular ischemic change.    Past Medical History:   Diagnosis Date      Adenomatous polyp 05/2013    needs colonsocpy every 5 years     Alcohol dependence (H)      Asthma      Benign prostatic hyperplasia with lower urinary tract symptoms, symptom details unspecified 3/9/2022     Cerebrovascular accident (H) 02/19/2022     Cervical radiculopathy      CHF (congestive heart failure) (H) 01/2011     COPD (chronic obstructive pulmonary disease) (H)      Coronary artery calcification      DDD (degenerative disc disease), cervical      DDD (degenerative disc disease), lumbosacral      Dupuytren's contracture 03/02/2015     Dyslipidemia      Elevated fasting glucose      ETOHism (H)      HTN (hypertension)      Lumbar spinal stenosis      Lung cancer (H) 06/16/2021     Moderate major depression (H) 11/09/2011     Renal insufficiency      Trigger finger, acquired 03/02/2015     Current Outpatient Medications   Medication     diphenhydrAMINE (BENADRYL) 50 MG capsule     MEDROL 32 MG tablet     aspirin 81 MG EC tablet     atorvastatin (LIPITOR) 40 MG tablet     FLUoxetine (PROZAC) 20 MG capsule     tamsulosin (FLOMAX) 0.4 MG capsule     No current facility-administered medications for this visit.          EXAM      Orientation: Normal; Language normal; Attention: Serial 7: 5/5  Normal  Memory 0/3    Cranial nerves: EOMI face symmetric tongue ML shoulder shrug normal no face numbness    Motor: FFM normal R slow L; No drift; Strength anti gravity or better both UE and LE but L UE is weaker than $ (~4) and movement in L fingers and L wrist is much slower than R    Sensory: no deficits to LT reported  Co-ordination normal in both UE   Gait: wide base slow gait     Blaine Dean MD

## 2022-03-29 NOTE — PATIENT INSTRUCTIONS
1. RTC 2 weeks  2. Zio ordered  3. LE doppler ordered  4. Will talk to Dr. Cornejo re: contrast allergy - discussed MRI ordered with pre-medication    If you have any questions please contact me at [phone], option 3.    Tavares Linares RN, CNRN  Stroke & Endovascular Care Coordinator    Thank you for choosing [facility] for your health care needs.

## 2022-03-29 NOTE — LETTER
3/29/2022       RE: Kailash Murray  [address]  [place] [address]     Dear Colleague,    Thank you for referring your patient, Kailash Murray, to the [facility]UROLOG[facility]IC [place] [facility]. Please see a copy of my visit note below.          [facility]UROLOGY [facility]IC [address]  3RD FLO[address]  [place] [address] [phone]      March 29, 2022    Kailash Murray                                                                                                                     [address]   [place] [address]      Video visit: 12-12:45 PMDocum  documentation, discussion with PCP, care coordination: 30 minutes  TOTAL 75 minutes    Mr. Murrya is a 67 year old gentleman with a complex PMH as listed below and vascular risk factors including HTN, dyslipidemia and a h/o smoking. He also has a h/o adeno ca of the lung in 6/21 which has been resented. This gentleman went to [place] in January of this year and got COVID. His wife then found subsequently that he was confused. For example, he was not getting getting dressed correctly - putting on his clothes backwards. He was loading the laundry incorrectly and making other errors. He was evaluated with a brain MRI on 2/17 and was found to have two recent infarcts - one in the posterior R MCA and a small stroke in the L cerebellar hemisphere. His gait was impaired and his wife has not let him drive. He has also been having a lot of bilateral calf pain and this may have been coincident with re-starting his statin. He had a bad HA 5-7 days ago and also felt that he had a shadow in his L visual field - the exact start date of this shadow is unknown though it was after his stroke according to the patient. His PCP is Dr. Cornejo and oncologist is Dr. Gomez. His wife feels that his cognitive issues and gait are improving slowly. He  has not had any therapies.     The patient has a significant h/o tobacco smoking (3 PPD) and is now down to 1-2 cigs daily. H/O alcohol use - now stopped for more than 2 years. 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 He is on 325 mg of aspirin and a statin - I do not see any BP medications.     Allergic to contrast dye.      ASSESSMENT / PLAN  Encounter Diagnoses   Name Primary?     Cerebrovascular accident (CVA), unspecified mechanism (H) Yes     Bilateral calf pain      Deep vein thrombosis (DVT) of both lower extremities, unspecified chronicity, unspecified vein (H)      Allergy, subsequent encounter      This 67 year ols gentleman has had two recent strokes in the context of COVID. The stroke etiology may be due to a COVID related pro-thrombotic state. He also has a h/o adenoca of the lung. I have ordered a first line stroke work up. I will repeat his brain MRI given his new symptoms and also have ordered a vascular work up. His PCP has ordered a TTE and after discussion this will be changed to a SHIELA by them. I have ordered a LE doppler. He does have cognitive difficulties, gait difficulties and difficult with his  L UE. Therapies have been ordered. He should not drive unless cleared by OT/Rehab medicine. I have requested RTC in 4 weeks. Goal BP < 140/90 mm Hg.     Orders Placed This Encounter   Procedures     US Lower Extremity Venous Duplex Bilateral     MR Brain w/o & w Contrast     MRA Neck (Carotids) wo & w Contrast     MRA Brain ([place]) wo & w Contrast     CK total     Physical Therapy Referral     Speech Therapy Referral     Occupational Therapy Referral     Leadless EKG Monitor 8 to 14 Days     I have ordered premedication for MRI contrast.       MRI  2/17/22                                                                      IMPRESSION:  1.  Small acute or subacute infarct is seen within the left cerebellar hemisphere.  Patchy areas of ischemia are seen within the  posterior right MCA distribution (right parietal and occipital lobes) favored to be subacute. Involvement of more than one   vascular injury suggests a central/embolic etiology.     2.  Moderate volume loss and mild presumed sequela chronic microvascular ischemic change.    Past Medical History:   Diagnosis Date     Adenomatous polyp 05/2013    needs colonsocpy every 5 years     Alcohol dependence (H)      Asthma      Benign prostatic hyperplasia with lower urinary tract symptoms, symptom details unspecified 3/9/2022     Cerebrovascular accident (H) 02/19/2022     Cervical radiculopathy      CHF (congestive heart failure) (H) 01/2011     COPD (chronic obstructive pulmonary disease) (H)      Coronary artery calcification      DDD (degenerative disc disease), cervical      DDD (degenerative disc disease), lumbosacral      Dupuytren's contracture 03/02/2015     Dyslipidemia      Elevated fasting glucose      ETOHism (H)      HTN (hypertension)      Lumbar spinal stenosis      Lung cancer (H) 06/16/2021     Moderate major depression (H) 11/09/2011     Renal insufficiency      Trigger finger, acquired 03/02/2015     Current Outpatient Medications   Medication     diphenhydrAMINE (BENADRYL) 50 MG capsule     MEDROL 32 MG tablet     aspirin 81 MG EC tablet     atorvastatin (LIPITOR) 40 MG tablet     FLUoxetine (PROZAC) 20 MG capsule     tamsulosin (FLOMAX) 0.4 MG capsule     No current facility-administered medications for this visit.          EXAM      Orientation: Normal; Language normal; Attention: Serial 7: 5/5  Normal  Memory 0/3    Cranial nerves: EOMI face symmetric tongue ML shoulder shrug normal no face numbness    Motor: FFM normal R slow L; No drift; Strength anti gravity or better both UE and LE but L UE is weaker than $ (~4) and movement in L fingers and L wrist is much slower than R    Sensory: no deficits to LT reported  Co-ordination normal in both UE   Gait: wide base slow gait         Blaine  Dianne LEWIS

## 2022-03-31 NOTE — TELEPHONE ENCOUNTER
Finally got a hold of someone the tech said everything will be good to go and patient can keep his scheduled appt for today.  Corina Martinez,

## 2022-04-01 NOTE — TELEPHONE ENCOUNTER
Please call patient: your CK muscle enzyme is slightly high. I recommend stopping atorvastatin and follow-up for lab only recheck in 2 - 4 weeks. Let's consider an alternative cholesterol medication when your muscle pain is resolved.   Carolyne Cornejo MD

## 2022-04-01 NOTE — TELEPHONE ENCOUNTER
Patient notified of provider message as written. Patient verbalized understanding. Patient transferred to scheduling to help with appointment. Patient prefers [place] lab location     Marj Sam RN

## 2022-04-04 NOTE — LETTER
4/4/2022         RE: Kailash Murray  [address]  [place] [address]        Dear Colleague,    Thank you for referring your patient, Kailash Murray, to the [facility]. Please see a copy of my visit note below.    Chief Complaint   Patient presents with     Annual Eye Exam      Beginning of January had covid. End of January had a series of strokes, since then there has been blurred vision in the left eye. Occasionally sees a flash of light in the left eye. The stroke was in the right hemisphere and so the left side was affected the most.     Accompanied by wife  Last Eye Exam: 15 years ago   Dilated Previously: Yes, side effects of dilation explained today    What are you currently using to see?  OTC readers +2.50       Distance Vision Acuity: Satisfied with vision    Near Vision Acuity: Not satisfied     Eye Comfort: good  Do you use eye drops? : No  Occupation or Hobbies: reading    Denelle Xenia - Optometric Assistant          Medical, surgical and family histories reviewed and updated 4/4/2022.       OBJECTIVE: See Ophthalmology exam    ASSESSMENT:    ICD-10-CM    1. Pseudophakia - Both Eyes  Z96.1 EYE EXAM (SIMPLE-NONBILLABLE)     REFRACTION   2. Regular astigmatism of both eyes - Both Eyes  H52.223 EYE EXAM (SIMPLE-NONBILLABLE)     REFRACTION       PLAN:     Patient Instructions   Astigmatism results from curvature differential in the cornea and crystalline lens which can cause a distorted image, as light rays are prevented from meeting at a common focus. It is common after cataract surgery.    Pseudophakia in both eyes    You have a PVD- posterior vitreous detachment which is due to the gel of the eye shrinking and clumping together.  This can sometimes cause holes or tears in the retina.  The signs of a retinal detachment are flashes of light or a curtain coming over the vision. If you notice any of these changes please let me know right away.  If I  am not available this is an emergency type situation that you would need to be seen. I recommend the [facility] Emergency Room or call [phone].      RTC 1 month.    Stefanie Pettit O.D.  [address] [address]    [phone]             Again, thank you for allowing me to participate in the care of your patient.        Sincerely,        Stefanie Pettit OD

## 2022-04-04 NOTE — PROGRESS NOTES
Chief Complaint   Patient presents with     Annual Eye Exam      Beginning of January had covid. End of January had a series of strokes, since then there has been blurred vision in the left eye. Occasionally sees a flash of light in the left eye. The stroke was in the right hemisphere and so the left side was affected the most.     Accompanied by wife  Last Eye Exam: 15 years ago   Dilated Previously: Yes, side effects of dilation explained today    What are you currently using to see?  OTC readers +2.50       Distance Vision Acuity: Satisfied with vision    Near Vision Acuity: Not satisfied     Eye Comfort: good  Do you use eye drops? : No  Occupation or Hobbies: reading    Festus Michaels - Optometric Assistant          Medical, surgical and family histories reviewed and updated 4/4/2022.       OBJECTIVE: See Ophthalmology exam    ASSESSMENT:    ICD-10-CM    1. Pseudophakia - Both Eyes  Z96.1 EYE EXAM (SIMPLE-NONBILLABLE)     REFRACTION   2. Regular astigmatism of both eyes - Both Eyes  H52.223 EYE EXAM (SIMPLE-NONBILLABLE)     REFRACTION       PLAN:     Patient Instructions   Astigmatism results from curvature differential in the cornea and crystalline lens which can cause a distorted image, as light rays are prevented from meeting at a common focus. It is common after cataract surgery.    Pseudophakia in both eyes    You have a PVD- posterior vitreous detachment which is due to the gel of the eye shrinking and clumping together.  This can sometimes cause holes or tears in the retina.  The signs of a retinal detachment are flashes of light or a curtain coming over the vision. If you notice any of these changes please let me know right away.  If I am not available this is an emergency type situation that you would need to be seen. I recommend the [facility] Emergency Room or call [phone].      RTC 1 month.    Stefanie Pettit O.D.  [facility]   [address] [place]  [address]    [phone]

## 2022-04-04 NOTE — PATIENT INSTRUCTIONS
Astigmatism results from curvature differential in the cornea and crystalline lens which can cause a distorted image, as light rays are prevented from meeting at a common focus. It is common after cataract surgery.    Pseudophakia in both eyes    You have a PVD- posterior vitreous detachment which is due to the gel of the eye shrinking and clumping together.  This can sometimes cause holes or tears in the retina.  The signs of a retinal detachment are flashes of light or a curtain coming over the vision. If you notice any of these changes please let me know right away.  If I am not available this is an emergency type situation that you would need to be seen. I recommend the [facility] Emergency Room or call [phone].      RTC 1 month.    Stefanie Pettit O.D.  [address] [address]    [phone]

## 2022-04-05 NOTE — PATIENT INSTRUCTIONS
Labs in the next few days    Schedule CT Abdomen and Pelvis    Take Medrol 32 mg orally 12 hours before CT IV contrast and then 2 hours before CT IV contrast to prevent allergic reaction    See me back in 4 weeks

## 2022-04-05 NOTE — PROGRESS NOTES
Kailash is a 67 year old who is being evaluated via a billable video visit.  Currently in [facility] [place].    How would you like to obtain your AVS? MyChart  If the video visit is dropped, the invitation should be resent by: Text to cell phone: [phone]  Will anyone else be joining your video visit? Yes, patients wife Lola Selby Lokesh, OZZIE  Video Start Time: 11:33 AM  Video-Visit Details    Type of service:  Video Visit    Video End Time:11:53 AM    Originating Location (pt. Location): Home    Distant Location (provider location):  [facility]     Platform used for Video Visit: Bluenog

## 2022-04-05 NOTE — NURSING NOTE
Patient denies any changes since echeck-in regarding medication and allergies and states all information entered during echeck-in remains accurate.    Sola Campa, VF
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
## 2022-04-05 NOTE — LETTER
4/5/2022         RE: Kaliash Murray  [address]  [place] [address]        Dear Colleague,    Thank you for referring your patient, Kailash Murray, to the [facility]. Please see a copy of my visit note below.    Hematology initial visit:  Date on this visit: 4/5/2022    Kailash Murray  is referred by Dr.Elise Victoria Cornejo for a hematology consultation. He requires evaluation for anemia/thrombocytopenia.    Primary Physician: Carolyne Cornejo     History Of Present Illness:  Mr. Murray is a 67 year old male who presents with anemia/thrombocytopenia.    This is a video visit.    He has a history of 1.1 cm of poorly differentiated adenocarcinoma of the lung which was treated with left upper lobectomy on 6/16/2021.  There was no visceral pleural invasion or angiolymphatic invasion.  All margins were negative.  All lymph nodes were negative.  PD-L1 TPS 90%. KRAS G12C, STK11  K48fs      It was a pT1bN0 lesion.    He is being followed by thoracic surgery.    He had covid in early Jan 2022 and then had stroke in February in 2022.  He has been noticed to have anemia and thrombocytopenia.    He feels labored breathing when he wakes up in the morning. It gets better as the day progresses. He feels mucus/congestion in the chest.  He feels very fatigued and sleeps a lot.  This is going on for 3 months.  No new swellings. No abnormal bleeding. BMs are fine.   He has poor appetite.  Has lost about 30 lbs over the last few months.  No fevers or tingling/numbness. Recovered well from the stroke but has mild cognitive impairment.        ROS:  Rest of the comprehensive review of the system was essentially unremarkable.    Past Medical/Surgical History:  Past Medical History:   Diagnosis Date     Adenomatous polyp 05/2013    needs colonsocpy every 5 years     Alcohol dependence (H)      Asthma      Benign prostatic hyperplasia with lower urinary tract  symptoms, symptom details unspecified 3/9/2022     Cerebrovascular accident (H) 02/19/2022     Cervical radiculopathy      CHF (congestive heart failure) (H) 01/2011     COPD (chronic obstructive pulmonary disease) (H)      Coronary artery calcification      DDD (degenerative disc disease), cervical      DDD (degenerative disc disease), lumbosacral      Dupuytren's contracture 03/02/2015     Dyslipidemia      Elevated fasting glucose      ETOHism (H)      HTN (hypertension)      Lumbar spinal stenosis      Lung cancer (H) 06/16/2021     Moderate major depression (H) 11/09/2011     Nonsenile cataract      Renal insufficiency      Trigger finger, acquired 03/02/2015     Past Surgical History:   Procedure Laterality Date     BIOPSY  2021    Lung cancer     CATARACT IOL, RT/LT  8/17/2010    left     CATARACT IOL, RT/LT  8/31/2010    right     COLONOSCOPY  2013     COLONOSCOPY WITH CO2 INSUFFLATION N/A 6/8/2018    Procedure: COLONOSCOPY WITH CO2 INSUFFLATION;  V76.51 (ICD-9-CM) - Z12.11 (ICD-10-CM) - Screen for colon cancer  BMI 22.84  BCBS/, patient refused taxid# and  NPJOEL#  Dr Vitale is referring doctor  [facility] on [address]- Fax# [phone];  Surgeon: Duane, William Charles, MD;  Location:  [facility]     [facility]    Procedure: Robot-Assisted Left Upper Lobe Wedge Resection, Completion Left Upper Lobectomy, Mediastinal Lymph Node Dissection;  Surgeon: Omar Mcfarland MD;  Location: [facility] OR     SURGICAL HISTORY OF -   1991    cyst removal from back      Allergies:  Allergies as of 04/05/2022 - Reviewed 04/04/2022   Allergen Reaction Noted     Atorvastatin Muscle Pain (Myalgia) 04/01/2022     Coreg [carvedilol]  02/16/2011     Dye [contrast dye]  02/10/2011     Hctz  02/03/2011     Paxil [paroxetine]  10/08/2009     Pcn [penicillins] Rash 07/26/2009     Triple antibiotic Rash 07/26/2009     Current Medications:  Current Outpatient Medications   Medication Sig Dispense  Refill     aspirin 81 MG EC tablet Take 81 mg by mouth daily 4 tablets daily       diphenhydrAMINE (BENADRYL) 50 MG capsule Take 1 capsule (50 mg) by mouth as needed for itching or allergies Administer 30 min - 2 hours pre - IV contrast injection 1 capsule 0     FLUoxetine (PROZAC) 20 MG capsule Take 1 capsule (20 mg) by mouth daily 90 capsule 1     MEDROL 32 MG tablet Take 1 tablet (32 mg) by mouth as needed (Take 1 Tablet (32 mg) 12 hours before and 1 Tablet (32 mg) 2 hours before) 12 hours prior to the procedure with IV contrast 2 tablet 0     tamsulosin (FLOMAX) 0.4 MG capsule Take 1 capsule (0.4 mg) by mouth daily 90 capsule 3      Family History:  Family History   Problem Relation Age of Onset     Cancer Mother      Cerebrovascular Disease Mother      Thyroid Disease Mother      Other Cancer Mother      Heart Disease Father 50        MI      Hypertension Father      Hyperlipidemia Father      Coronary Artery Disease Father      Deep Vein Thrombosis (DVT) Father      Diabetes Sister         Recently released from hospital with health problems.  Obese, diabetic.     Heart Disease Sister         Had a heart valv replaced      Coronary Artery Disease Sister      Hypertension Sister      Anxiety Disorder Sister      Obesity Sister      Chemical Addiction Brother      Diabetes Brother          age 63, MI     Substance Abuse Brother      Coronary Artery Disease Brother         Passed from heart issues.  1/2 brother.     Unknown/Adopted Maternal Grandmother         Alcoholic     Unknown/Adopted Maternal Grandfather      Unknown/Adopted Paternal Grandmother      Unknown/Adopted Paternal Grandfather      Anxiety Disorder Brother      Anxiety Disorder Brother      Anesthesia Reaction No family hx of      No family history of bleeding or clotting disorder.  Social History:  Social History     Socioeconomic History     Marital status:      Spouse name: Lola     Number of children: 6     Years of education: Not  on file     Highest education level: Not on file   Occupational History     Occupation: /jered     Employer: [employer]     Occupation: RETIRED   Tobacco Use     Smoking status: Current Every Day Smoker     Packs/day: 0.50     Years: 50.00     Pack years: 25.00     Types: Cigarettes     Start date: 3/20/1972     Smokeless tobacco: Never Used     Tobacco comment: 4 cigs per day    Vaping Use     Vaping Use: Never used   Substance and Sexual Activity     Alcohol use: Not Currently     Alcohol/week: 14.0 standard drinks     Comment: history of abuse, '20     Drug use: No     Sexual activity: Not Currently     Partners: Female     Birth control/protection: None, Post-menopausal   Other Topics Concern     Parent/sibling w/ CABG, MI or angioplasty before 65F 55M? Yes   Social History Narrative     Not on file     Social Determinants of Health     Financial Resource Strain: Not on file   Food Insecurity: Not on file   Transportation Needs: Not on file   Physical Activity: Not on file   Stress: Not on file   Social Connections: Not on file   Intimate Partner Violence: Not on file   Housing Stability: Not on file     Was a heavy smoker and now smoking 1 cig/day. No etoh currently. Was a heavy drinker but quit in Nov 2021.    Physical Exam:  There were no vitals taken for this visit.   Wt Readings from Last 4 Encounters:   03/09/22 74.4 kg (164 lb)   02/16/22 75.1 kg (165 lb 9.6 oz)   06/23/21 79.4 kg (175 lb)   06/17/21 84.1 kg (185 lb 6.4 oz)       Constitutional.  Does not seem to be in any acute distress.  Eyes.  No redness or discharge noted.  Respiratory.  Speaking in full sentences.  Breathing seems comfortable without any accessory use of muscles.    Skin.  Visualized his skin does not show any obvious rashes.  Musculoskeletal.  Range of motion for visualized areas is intact.  Neurological.  Alert and oriented x3.  Psychiatric.  Mood, mentation and affect are normal.  Decision making capacity is  intact.      The rest of a comprehensive physical examination is deferred due to Public Health Emergency video visit restrictions.    Laboratory/Imaging Studies      Reviewed  3/9/2022.  CBC showed WBC 12.3.  Neutrophils 8.5.  Eosinophils 1.  Hemoglobin 10.7.  Platelets 121.  MCV 86.  2/10/2022.  CMP unremarkable.  Ferritin 364.  Iron 37.  TIBC 285.  Iron saturation index 13%.    Folate and B12 were normal on 3/9/2022.    CT chest on 3/16/2022    IMPRESSION:  1.  Bronchial wall thickening/endobronchial secretions at the left  hilum and lower lobe airways, as detailed above. No definite evidence  of recurrence, though follow up with a CT chest with contrast at the  next interval.  2. Additional findings supporting above include enlarging ipsilateral  prevascular and paratracheal/pretracheal and left supraclavicular  lymph nodes. Consider PET CT for further evaluation.  3. New indeterminant 3 mm solid nodule in the right lower lobe.  4. Left thoracotomy and left upper lobe and lingular lobectomy Changes.        MRI brain.  2/17/2022  IMPRESSION:  1.  Small acute or subacute infarct is seen within the left cerebellar hemisphere.  Patchy areas of ischemia are seen within the posterior right MCA distribution (right parietal and occipital lobes) favored to be subacute. Involvement of more than one vascular injury suggests a central/embolic etiology.     2.  Moderate volume loss and mild presumed sequela chronic microvascular ischemic change.        ASSESSMENT/PLAN:    Anemia.  Check peripheral blood smear.  There was some evidence of iron deficiency with low iron saturation index in February 2022.  We will also check erythropoietin.  Repeat iron studies.    Thrombocytopenia.  In addition to peripheral blood smear, I would also recommend checking imaging to assess liver and spleen.  I will check CT abdomen and pelvis because of the weight loss, history of lung cancer as well as cytopenias.  We will also screen for  hepatitis B, hepatitis C and HIV.    Lung cancer.  Had surgery for early stage lung cancer in June 2021.  CT scan in March 2022 showed some enlargement of mediastinal and left supraclavicular lymph nodes.  Being followed by thoracic surgery.  There is plan to repeat CT chest in June 2022.  We will check CT abdomen pelvis because of the weight loss and other symptoms which he is mentioning.  I would like to rule out metastatic disease    I will plan to see him back in about 4 weeks also    All questions were answered to his satisfaction.  He is agreeable and comfortable with the plan.    Mark Rahman MD         Kailash is a 67 year old who is being evaluated via a billable video visit.  Currently in [facility].    How would you like to obtain your AVS? MyChart  If the video visit is dropped, the invitation should be resent by: Text to cell phone: [phone]  Will anyone else be joining your video visit? Yes, patients wife Lola Selby Lokesh,   Video Start Time: 11:33 AM  Video-Visit Details    Type of service:  Video Visit    Video End Time:11:53 AM    Originating Location (pt. Location): Home    Distant Location (provider location):  [facility]     Platform used for Video Visit: [employer]      Again, thank you for allowing me to participate in the care of your patient.        Sincerely,        Mark Rahman MD

## 2022-04-06 NOTE — PATIENT INSTRUCTIONS
See pulmonology    Albuterol inhaler as needed    Could try the antibiotic as therapeutic trial    Start one over the counter iron pill bhavya    Recheck hemoglobin in 1-2 months    Increase calorie intake     Increase activity

## 2022-04-06 NOTE — PROGRESS NOTES
Zana Linder is a 67 year old who presents for the following health issues     History of Present Illness       Mental Health Follow-up:                    Today's PHQ-9         PHQ-9 Total Score: 15  PHQ-9 Q9 Thoughts of better off dead/self-harm past 2 weeks :   (P) Not at all    How difficult have these problems made it for you to do your work, take care of things at home, or get along with other people: Extremely difficult        Reason for visit:  Problems with breathing, thick mucus would like to see about an inhaler or nebulizer  Symptom onset:  More than a month  Symptoms include:  Since having Covid early Abraham. Have had difficulty with taking deep breaths,and it is getting worse. Hard to breath rudolph. in the mornings. Had upper left lung removed for lung cancer last June.  Symptom intensity:  Severe  Symptom progression:  Worsening  Had these symptoms before:  Yes  What makes it worse:  Salty foods seem to make it worse  What makes it better:  Hot coffee and hot tea    He eats 0-1 servings of fruits and vegetables daily.He consumes 2 sweetened beverage(s) daily.He exercises with enough effort to increase his heart rate 9 or less minutes per day.  He exercises with enough effort to increase his heart rate 3 or less days per week. He is missing 1 dose(s) of medications per week.  He is not taking prescribed medications regularly due to remembering to take.          Review of Systems   Early Abraham covid    Then a couple strokes    Left upper lobe removed June 2021    Short of breath, hard to breathe    Hard to sleep    In bed 18 hours a day    Sleep very intermittent    Tired    Not much appetite    Losing wt    Patient thinks the wt loss is explained by amt of food he is eating    Drinking fluids okay, maybe not enough    No oxygen at home    Not much activity level    Retired last year    Feels like has to vomit    Phlegm stuck in chest     Clear     No sleep apnea    Rarely snores          Objective     BP (!) 177/70 (BP Location: Right arm, Patient Position: Chair, Cuff Size: Adult Regular)   Pulse 89   Temp 97.3  F (36.3  C) (Temporal)   Wt 69.4 kg (153 lb)   SpO2 94%   BMI 22.59 kg/m    Body mass index is 22.59 kg/m .  Physical Exam  Constitutional:       Appearance: He is well-developed.   HENT:      Head: Normocephalic and atraumatic.   Eyes:      Conjunctiva/sclera: Conjunctivae normal.   Neck:      Vascular: No carotid bruit.   Cardiovascular:      Rate and Rhythm: Normal rate and regular rhythm.      Heart sounds: Murmur heard.   Pulmonary:      Effort: Pulmonary effort is normal. No respiratory distress.      Breath sounds: Normal breath sounds.   Neurological:      Mental Status: He is alert and oriented to person, place, and time.      Cranial Nerves: No cranial nerve deficit.   Psychiatric:         Speech: Speech normal.         Behavior: Behavior normal.         no edema    Radials faint but symmetric        ASSESSMENT / PLAN:  (R05.9) Cough  (primary encounter diagnosis)  Comment: xray done here.  Some scarring present, volume loss left lung as expected.  No obvious chf on xray.   Plan: XR Chest 2 Views             (R06.2) Wheezing  Comment: patient has no inhalers.  As therapeutic trial, given albuterol.  Warned of side effects.   Plan: albuterol (PROAIR HFA/PROVENTIL HFA/VENTOLIN         HFA) 108 (90 Base) MCG/ACT inhaler             (J20.9) Acute bronchitis with symptoms > 10 days  Comment: if not improving could do therapeutic trial of antibiotics but not convincing for infection  Plan: azithromycin (ZITHROMAX) 250 MG tablet             (J43.9) Pulmonary emphysema, unspecified emphysema type (H)  Comment: I think patient should see pulmonary.  He had the upper lobectomy for cancer but likely has copd/ emphysema given long smoking history.  Pulmonary could clarify which maintenance inhalers would be best to use etc.   Plan: Adult Pulmonary Medicine Referral             (C34.90) Malignant  neoplasm of lung, unspecified laterality, unspecified part of lung (H)  Comment: s/p lobectomy   Plan: as above ; followed by onc    (D64.9) Anemia, unspecified type  Comment: he just had labs done today per specialist.  Hemoglobin low, and this is likely contributing to symptoms.   Plan: start daily iron pill.    Wt loss: this is concerning.  Patient needs to increase calorie intake.  Discussed in detail.       I reviewed the patient's medications, allergies, medical history, family history, and social history.    Vasquez Jordan MD

## 2022-04-07 NOTE — RESULT ENCOUNTER NOTE
One of the veins in your R leg is blocked. Please call your primary care to discuss next steps. Thank you

## 2022-04-07 NOTE — RESULT ENCOUNTER NOTE
Please call patient:    Your ultrasound shows possible artery blockage. Call the imaging center at [phone] or  [phone] to schedule an artery ultrasound.     Carolyne Cornejo MD

## 2022-04-14 NOTE — TELEPHONE ENCOUNTER
4/14 [facility] to schedule PFT prior to appointment with Brent. Provided patient with callback [phone].         ----- Message from MARGARET MORAN sent at 4/14/2022 12:37 PM CDT -----  Regarding: PFTs needed  Hello,  This pt was scheduled to see Dr. Kennedy 05/11 for COPD. Please schedule patient for complete PFT prior. Thank you!    Margaret Moran LPN  Pulmonary Medicine:  [facility]  Phone: [phone] Fax: [phone]

## 2022-04-21 NOTE — TELEPHONE ENCOUNTER
4/21 2nd attempt . Lvm to schedule PFT prior to appointment with Brent, provided patient with callback [phone].     Vicki Colindres  Procedure    Cardiology, Rheumatology, GI, Pulmonology, Nephrology Specialties   [facility]  [phone]

## 2022-05-04 NOTE — RESULT ENCOUNTER NOTE
Mr. Murray - your heart monitor did not show AFIB. That is good news. Your heart ran fast at time (tachycardia). Please discuss that with your primary care provider. Sincerely Dr. Quinones

## 2022-05-10 ENCOUNTER — PRE VISIT (OUTPATIENT)
Dept: NEUROLOGY | Facility: CLINIC | Age: 67
End: 2022-05-10

## 2023-04-18 NOTE — LETTER
[facility]  [address] [address] [id number] [phone]          April 2, 2019    Kailash Murray                                                                                                                     [address] [place] [id number]          To whom it may concern.      I have prescribed Gabapentin for Kailash.  This medication is not being prescribed for seizures.         Sincerely,         Rickie Horowitz MD    
normal

## 2024-10-24 ASSESSMENT — PATIENT HEALTH QUESTIONNAIRE - PHQ9: SUM OF ALL RESPONSES TO PHQ QUESTIONS 1-9: 0

## (undated) DEVICE — DAVINCI XI GRASPER FENESTRATED TIP UP 8MM 470347

## (undated) DEVICE — SURGICEL ABSORBABLE HEMOSTAT SNOW 4"X4" 2083

## (undated) DEVICE — DAVINCI XI DRAPE COLUMN 470341

## (undated) DEVICE — ESU PENCIL W/COATED BLADE E2450H

## (undated) DEVICE — TIES BANDING T50R

## (undated) DEVICE — LINEN TOWEL PACK X6 WHITE 5487

## (undated) DEVICE — SU VICRYL 0 UR-6 27" J603H

## (undated) DEVICE — WIPES FOLEY CARE SURESTEP PROVON DFC100

## (undated) DEVICE — DRAPE SHEET REV FOLD 3/4 9349

## (undated) DEVICE — Device

## (undated) DEVICE — ENDO VALVE SUCTION BRONCH EVIS MAJ-209

## (undated) DEVICE — DAVINCI XI ENDOWRIST STAPLER RELOAD 30MM WHITE 48630W

## (undated) DEVICE — SPONGE DOUBLE 1.25" W/STRING 23275-690

## (undated) DEVICE — DRAPE MAYO STAND 23X54 8337

## (undated) DEVICE — TUBING CONMED AIRSEAL SMOKE EVAC INSUFFLATION ASM-EVAC

## (undated) DEVICE — STPL RELOAD REG TISSUE ECHELON 45 X 3.6MM BLUE GST45B

## (undated) DEVICE — POUCH TISSUE RETRIEVAL 15MM 6.00" INTRO TRS190SB2

## (undated) DEVICE — CUP AND LID 2PK 2OZ STERILE  SSK9006A

## (undated) DEVICE — SUCTION DRY CHEST DRAIN OASIS 3600-100

## (undated) DEVICE — VESSEL LOOPS YELLOW MAXI 31145694

## (undated) DEVICE — STPL DAVINCI SUREFORM 45MM STR TIPRELOAD 12FIRE 480445

## (undated) DEVICE — ENDO DISSECTOR KITTNER CIGARETTE ROLL4"X4" 15505/25

## (undated) DEVICE — CATH TRAY FOLEY SURESTEP 16FR W/URNE MTR STLK LATEX A303316A

## (undated) DEVICE — POUCH ENDOSCOPIC 5X8" W/O HANDLE DISP LAP SAC G16496

## (undated) DEVICE — SU SILK 0 SH 30" K834H

## (undated) DEVICE — CLIP APPLIER ENDO 5MM M/L LIGAMAX EL5ML

## (undated) DEVICE — STPL DAVINCI SUREFORM 45MM RELOAD BLUE 48345B

## (undated) DEVICE — LINEN TOWEL PACK X5 5464

## (undated) DEVICE — DRSG PRIMAPORE 02X3" 7133

## (undated) DEVICE — ATTACHMENT DEVICE DRAIN/TUBE 9781

## (undated) DEVICE — KIT ENDO FIRST STEP DISINFECTANT 200ML W/POUCH EP-4

## (undated) DEVICE — SU VICRYL 2-0 SH 27" UND J417H

## (undated) DEVICE — PREP CHLORAPREP 26ML TINTED ORANGE  260815

## (undated) DEVICE — STPL DAVINCI SUREFORM 45MM RELOAD GREEN 48345G

## (undated) DEVICE — DAVINCI XI RETR ENDOWRIST SMALL GRAPTOR 470318

## (undated) DEVICE — DRAPE IOBAN INCISE 23X17" 6650EZ

## (undated) DEVICE — DRAIN CHEST TUBE 28FR STR 8028

## (undated) DEVICE — LUBRICANT INST ELECTROLUBE EL101

## (undated) DEVICE — GLOVE SENSICARE 7.5 MSG1075 LATEX FREE

## (undated) DEVICE — SUCTION MANIFOLD NEPTUNE 2 SYS 4 PORT 0702-020-000

## (undated) DEVICE — TUBING SUCTION 10'X3/16" N510

## (undated) DEVICE — ADH SKIN CLOSURE PREMIERPRO EXOFIN 1.0ML 3470

## (undated) DEVICE — SU MONOCRYL 4-0 PS-2 27" UND Y426H

## (undated) DEVICE — ESU GROUND PAD ADULT W/CORD E7507

## (undated) DEVICE — ENDO TROCAR CONMED AIRSEAL BLADELESS 12X120MM IAS12-120LP

## (undated) DEVICE — ESU ELEC BLADE 2.75" COATED/INSULATED E1455

## (undated) DEVICE — SOL WATER IRRIG 1000ML BOTTLE 07139-09

## (undated) DEVICE — DAVINCI XI DRAPE ARM 470015

## (undated) DEVICE — DRAPE SHEET MED 44X70" 9355

## (undated) DEVICE — DAVINCI SEAL CANNULA AND STPL 12MM 470380

## (undated) DEVICE — SYR 30ML LL W/O NDL 302832

## (undated) DEVICE — DRAPE STERI TOWEL LG 1010

## (undated) DEVICE — DAVINCI XI SEAL UNIVERSAL 5-8MM 470361

## (undated) DEVICE — DAVINCI ENDOWRIST STAPLER 45 SHEATH 410370

## (undated) DEVICE — SYSTEM CLEARIFY VISUALIZATION 21-345

## (undated) DEVICE — DAVINCI XI ESU BIPOLAR LONG 78DEG ANG ENDOWRIST EXT 471400

## (undated) DEVICE — SU SILK 0 TIE 6X30" A306H

## (undated) DEVICE — DRSG TELFA 3X8" 1238

## (undated) DEVICE — DAVINCI XI FCP CADIERE 8MM ENDOWRIST 471049

## (undated) DEVICE — ENDO APPLICATOR SURGIFLO PLASMA COBLATION MS1995

## (undated) RX ORDER — ONDANSETRON 2 MG/ML
INJECTION INTRAMUSCULAR; INTRAVENOUS
Status: DISPENSED
Start: 2021-01-01

## (undated) RX ORDER — CLINDAMYCIN PHOSPHATE 900 MG/50ML
INJECTION, SOLUTION INTRAVENOUS
Status: DISPENSED
Start: 2021-01-01

## (undated) RX ORDER — BUPIVACAINE HYDROCHLORIDE 2.5 MG/ML
INJECTION, SOLUTION EPIDURAL; INFILTRATION; INTRACAUDAL
Status: DISPENSED
Start: 2021-01-01

## (undated) RX ORDER — FENTANYL CITRATE 50 UG/ML
INJECTION, SOLUTION INTRAMUSCULAR; INTRAVENOUS
Status: DISPENSED
Start: 2021-01-01

## (undated) RX ORDER — LIDOCAINE HYDROCHLORIDE 10 MG/ML
INJECTION, SOLUTION EPIDURAL; INFILTRATION; INTRACAUDAL; PERINEURAL
Status: DISPENSED
Start: 2019-10-09

## (undated) RX ORDER — SIMETHICONE 40MG/0.6ML
SUSPENSION, DROPS(FINAL DOSAGE FORM)(ML) ORAL
Status: DISPENSED
Start: 2018-06-08

## (undated) RX ORDER — DEXAMETHASONE SODIUM PHOSPHATE 10 MG/ML
INJECTION, SOLUTION INTRAMUSCULAR; INTRAVENOUS
Status: DISPENSED
Start: 2021-01-01

## (undated) RX ORDER — DOBUTAMINE HYDROCHLORIDE 200 MG/100ML
INJECTION INTRAVENOUS
Status: DISPENSED
Start: 2021-01-01

## (undated) RX ORDER — HYDROMORPHONE HYDROCHLORIDE 1 MG/ML
INJECTION, SOLUTION INTRAMUSCULAR; INTRAVENOUS; SUBCUTANEOUS
Status: DISPENSED
Start: 2021-01-01

## (undated) RX ORDER — SODIUM CHLORIDE, SODIUM LACTATE, POTASSIUM CHLORIDE, CALCIUM CHLORIDE 600; 310; 30; 20 MG/100ML; MG/100ML; MG/100ML; MG/100ML
INJECTION, SOLUTION INTRAVENOUS
Status: DISPENSED
Start: 2021-01-01

## (undated) RX ORDER — METOPROLOL TARTRATE 1 MG/ML
INJECTION, SOLUTION INTRAVENOUS
Status: DISPENSED
Start: 2021-01-01

## (undated) RX ORDER — DEXMEDETOMIDINE HYDROCHLORIDE 100 UG/ML
INJECTION, SOLUTION INTRAVENOUS
Status: DISPENSED
Start: 2021-01-01

## (undated) RX ORDER — ATROPINE SULFATE 0.4 MG/ML
AMPUL (ML) INJECTION
Status: DISPENSED
Start: 2021-01-01

## (undated) RX ORDER — ALBUTEROL SULFATE 0.83 MG/ML
SOLUTION RESPIRATORY (INHALATION)
Status: DISPENSED
Start: 2021-04-13

## (undated) RX ORDER — FENTANYL CITRATE 50 UG/ML
INJECTION, SOLUTION INTRAMUSCULAR; INTRAVENOUS
Status: DISPENSED
Start: 2018-06-08

## (undated) RX ORDER — TRIAMCINOLONE ACETONIDE 40 MG/ML
INJECTION, SUSPENSION INTRA-ARTICULAR; INTRAMUSCULAR
Status: DISPENSED
Start: 2019-10-09